# Patient Record
Sex: MALE | Race: WHITE | Employment: OTHER
[De-identification: names, ages, dates, MRNs, and addresses within clinical notes are randomized per-mention and may not be internally consistent; named-entity substitution may affect disease eponyms.]

---

## 2017-01-06 DIAGNOSIS — M54.50 CHRONIC MIDLINE LOW BACK PAIN WITHOUT SCIATICA: ICD-10-CM

## 2017-01-06 DIAGNOSIS — G89.29 CHRONIC MIDLINE LOW BACK PAIN WITHOUT SCIATICA: ICD-10-CM

## 2017-01-09 RX ORDER — OXYCODONE HYDROCHLORIDE AND ACETAMINOPHEN 5; 325 MG/1; MG/1
1 TABLET ORAL 2 TIMES DAILY PRN
Qty: 60 TABLET | Refills: 0 | Status: SHIPPED | OUTPATIENT
Start: 2017-01-09 | End: 2017-02-13 | Stop reason: SDUPTHER

## 2017-01-31 RX ORDER — GABAPENTIN 300 MG/1
CAPSULE ORAL
Qty: 90 CAPSULE | Refills: 3 | Status: SHIPPED | OUTPATIENT
Start: 2017-01-31 | End: 2017-05-15 | Stop reason: SDUPTHER

## 2017-02-13 ENCOUNTER — OFFICE VISIT (OUTPATIENT)
Dept: INTERNAL MEDICINE | Facility: CLINIC | Age: 67
End: 2017-02-13

## 2017-02-13 VITALS
RESPIRATION RATE: 18 BRPM | HEART RATE: 92 BPM | SYSTOLIC BLOOD PRESSURE: 82 MMHG | DIASTOLIC BLOOD PRESSURE: 52 MMHG | TEMPERATURE: 99.2 F | BODY MASS INDEX: 26.22 KG/M2 | HEIGHT: 72 IN | WEIGHT: 193.6 LBS

## 2017-02-13 DIAGNOSIS — G89.29 CHRONIC MIDLINE LOW BACK PAIN WITHOUT SCIATICA: ICD-10-CM

## 2017-02-13 DIAGNOSIS — R11.2 NON-INTRACTABLE VOMITING WITH NAUSEA, UNSPECIFIED VOMITING TYPE: ICD-10-CM

## 2017-02-13 DIAGNOSIS — M54.50 CHRONIC MIDLINE LOW BACK PAIN WITHOUT SCIATICA: ICD-10-CM

## 2017-02-13 DIAGNOSIS — R73.09 ELEVATED HEMOGLOBIN A1C: Primary | ICD-10-CM

## 2017-02-13 DIAGNOSIS — J06.9 UPPER RESPIRATORY TRACT INFECTION, UNSPECIFIED TYPE: ICD-10-CM

## 2017-02-13 DIAGNOSIS — R05.9 COUGH: ICD-10-CM

## 2017-02-13 DIAGNOSIS — E78.00 PURE HYPERCHOLESTEROLEMIA: ICD-10-CM

## 2017-02-13 LAB — HBA1C MFR BLD: 5.9 %

## 2017-02-13 PROCEDURE — G8484 FLU IMMUNIZE NO ADMIN: HCPCS | Performed by: NURSE PRACTITIONER

## 2017-02-13 PROCEDURE — G8427 DOCREV CUR MEDS BY ELIG CLIN: HCPCS | Performed by: NURSE PRACTITIONER

## 2017-02-13 PROCEDURE — 83036 HEMOGLOBIN GLYCOSYLATED A1C: CPT | Performed by: NURSE PRACTITIONER

## 2017-02-13 PROCEDURE — 1123F ACP DISCUSS/DSCN MKR DOCD: CPT | Performed by: NURSE PRACTITIONER

## 2017-02-13 PROCEDURE — 3017F COLORECTAL CA SCREEN DOC REV: CPT | Performed by: NURSE PRACTITIONER

## 2017-02-13 PROCEDURE — G8420 CALC BMI NORM PARAMETERS: HCPCS | Performed by: NURSE PRACTITIONER

## 2017-02-13 PROCEDURE — 1036F TOBACCO NON-USER: CPT | Performed by: NURSE PRACTITIONER

## 2017-02-13 PROCEDURE — 99214 OFFICE O/P EST MOD 30 MIN: CPT | Performed by: NURSE PRACTITIONER

## 2017-02-13 PROCEDURE — 4040F PNEUMOC VAC/ADMIN/RCVD: CPT | Performed by: NURSE PRACTITIONER

## 2017-02-13 RX ORDER — GUAIFENESIN AND CODEINE PHOSPHATE 100; 10 MG/5ML; MG/5ML
5 SOLUTION ORAL 2 TIMES DAILY PRN
Qty: 140 ML | Refills: 0 | Status: SHIPPED | OUTPATIENT
Start: 2017-02-13 | End: 2017-02-20

## 2017-02-13 RX ORDER — TRAMADOL HYDROCHLORIDE 50 MG/1
TABLET ORAL
Qty: 90 TABLET | Refills: 0 | Status: CANCELLED | OUTPATIENT
Start: 2017-02-13

## 2017-02-13 RX ORDER — BENZONATATE 100 MG/1
100 CAPSULE ORAL 3 TIMES DAILY PRN
Qty: 30 CAPSULE | Refills: 0 | Status: SHIPPED | OUTPATIENT
Start: 2017-02-13 | End: 2017-05-15 | Stop reason: ALTCHOICE

## 2017-02-13 RX ORDER — TRAMADOL HYDROCHLORIDE 50 MG/1
TABLET ORAL
Qty: 90 TABLET | Refills: 0 | Status: SHIPPED | OUTPATIENT
Start: 2017-02-13 | End: 2017-05-15 | Stop reason: SDUPTHER

## 2017-02-13 RX ORDER — OXYCODONE HYDROCHLORIDE AND ACETAMINOPHEN 5; 325 MG/1; MG/1
1 TABLET ORAL 2 TIMES DAILY PRN
Qty: 60 TABLET | Refills: 0 | Status: SHIPPED | OUTPATIENT
Start: 2017-02-13 | End: 2017-04-11 | Stop reason: SDUPTHER

## 2017-02-13 RX ORDER — ONDANSETRON 4 MG/1
4 TABLET, FILM COATED ORAL EVERY 8 HOURS PRN
Qty: 30 TABLET | Refills: 0 | Status: SHIPPED | OUTPATIENT
Start: 2017-02-13 | End: 2017-08-07 | Stop reason: ALTCHOICE

## 2017-02-13 RX ORDER — OXYCODONE HYDROCHLORIDE AND ACETAMINOPHEN 5; 325 MG/1; MG/1
1 TABLET ORAL 2 TIMES DAILY PRN
Qty: 60 TABLET | Refills: 0 | Status: CANCELLED | OUTPATIENT
Start: 2017-02-13

## 2017-02-13 ASSESSMENT — ENCOUNTER SYMPTOMS
NAUSEA: 1
ABDOMINAL PAIN: 0
WHEEZING: 1
BACK PAIN: 1
VOICE CHANGE: 1
VOMITING: 1
CHEST TIGHTNESS: 1
SORE THROAT: 1
DIARRHEA: 0
BLOOD IN STOOL: 0
RHINORRHEA: 1
CONSTIPATION: 0
SINUS PAIN: 1
SHORTNESS OF BREATH: 0
SINUS PRESSURE: 1
COUGH: 1
TROUBLE SWALLOWING: 0

## 2017-03-06 RX ORDER — TEMAZEPAM 15 MG/1
CAPSULE ORAL
Qty: 90 CAPSULE | Refills: 0 | Status: SHIPPED | OUTPATIENT
Start: 2017-03-06 | End: 2017-05-15 | Stop reason: SDUPTHER

## 2017-04-07 RX ORDER — LEVOTHYROXINE SODIUM 0.07 MG/1
TABLET ORAL
Qty: 90 TABLET | Refills: 1 | Status: SHIPPED | OUTPATIENT
Start: 2017-04-07 | End: 2017-05-15 | Stop reason: SDUPTHER

## 2017-04-11 DIAGNOSIS — M54.50 CHRONIC MIDLINE LOW BACK PAIN WITHOUT SCIATICA: ICD-10-CM

## 2017-04-11 DIAGNOSIS — G89.29 CHRONIC MIDLINE LOW BACK PAIN WITHOUT SCIATICA: ICD-10-CM

## 2017-04-11 RX ORDER — OXYCODONE HYDROCHLORIDE AND ACETAMINOPHEN 5; 325 MG/1; MG/1
1 TABLET ORAL 2 TIMES DAILY PRN
Qty: 60 TABLET | Refills: 0 | Status: SHIPPED | OUTPATIENT
Start: 2017-04-11 | End: 2017-05-15 | Stop reason: SDUPTHER

## 2017-05-03 RX ORDER — TOPIRAMATE 50 MG/1
TABLET, FILM COATED ORAL
Qty: 180 TABLET | Refills: 0 | Status: SHIPPED | OUTPATIENT
Start: 2017-05-03 | End: 2017-05-15 | Stop reason: SDUPTHER

## 2017-05-03 RX ORDER — ALPRAZOLAM 0.5 MG/1
TABLET ORAL
Qty: 180 TABLET | Refills: 0 | Status: SHIPPED | OUTPATIENT
Start: 2017-05-03 | End: 2017-05-15 | Stop reason: SDUPTHER

## 2017-05-03 RX ORDER — SIMVASTATIN 80 MG
TABLET ORAL
Qty: 90 TABLET | Refills: 0 | Status: SHIPPED | OUTPATIENT
Start: 2017-05-03 | End: 2017-05-15 | Stop reason: SDUPTHER

## 2017-05-15 ENCOUNTER — OFFICE VISIT (OUTPATIENT)
Dept: PRIMARY CARE CLINIC | Age: 67
End: 2017-05-15
Payer: MEDICARE

## 2017-05-15 VITALS
HEIGHT: 72 IN | DIASTOLIC BLOOD PRESSURE: 72 MMHG | SYSTOLIC BLOOD PRESSURE: 114 MMHG | BODY MASS INDEX: 25.79 KG/M2 | WEIGHT: 190.4 LBS | HEART RATE: 72 BPM | RESPIRATION RATE: 18 BRPM

## 2017-05-15 DIAGNOSIS — M79.7 FIBROMYALGIA: ICD-10-CM

## 2017-05-15 DIAGNOSIS — Z11.59 NEED FOR HEPATITIS C SCREENING TEST: ICD-10-CM

## 2017-05-15 DIAGNOSIS — K21.9 GASTROESOPHAGEAL REFLUX DISEASE, ESOPHAGITIS PRESENCE NOT SPECIFIED: ICD-10-CM

## 2017-05-15 DIAGNOSIS — Z86.73 HX-TIA (TRANSIENT ISCHEMIC ATTACK): ICD-10-CM

## 2017-05-15 DIAGNOSIS — E03.9 ACQUIRED HYPOTHYROIDISM: ICD-10-CM

## 2017-05-15 DIAGNOSIS — G89.29 CHRONIC MIDLINE LOW BACK PAIN WITHOUT SCIATICA: Primary | ICD-10-CM

## 2017-05-15 DIAGNOSIS — M15.9 PRIMARY OSTEOARTHRITIS INVOLVING MULTIPLE JOINTS: ICD-10-CM

## 2017-05-15 DIAGNOSIS — G47.00 INSOMNIA, UNSPECIFIED TYPE: ICD-10-CM

## 2017-05-15 DIAGNOSIS — E78.00 PURE HYPERCHOLESTEROLEMIA: ICD-10-CM

## 2017-05-15 DIAGNOSIS — M54.50 CHRONIC MIDLINE LOW BACK PAIN WITHOUT SCIATICA: Primary | ICD-10-CM

## 2017-05-15 DIAGNOSIS — F41.9 ANXIETY: ICD-10-CM

## 2017-05-15 PROBLEM — G44.229 CHRONIC TENSION-TYPE HEADACHE, NOT INTRACTABLE: Status: ACTIVE | Noted: 2017-05-15

## 2017-05-15 PROCEDURE — G8420 CALC BMI NORM PARAMETERS: HCPCS | Performed by: NURSE PRACTITIONER

## 2017-05-15 PROCEDURE — 4040F PNEUMOC VAC/ADMIN/RCVD: CPT | Performed by: NURSE PRACTITIONER

## 2017-05-15 PROCEDURE — 3017F COLORECTAL CA SCREEN DOC REV: CPT | Performed by: NURSE PRACTITIONER

## 2017-05-15 PROCEDURE — 1123F ACP DISCUSS/DSCN MKR DOCD: CPT | Performed by: NURSE PRACTITIONER

## 2017-05-15 PROCEDURE — 99214 OFFICE O/P EST MOD 30 MIN: CPT | Performed by: NURSE PRACTITIONER

## 2017-05-15 PROCEDURE — 1036F TOBACCO NON-USER: CPT | Performed by: NURSE PRACTITIONER

## 2017-05-15 PROCEDURE — G8427 DOCREV CUR MEDS BY ELIG CLIN: HCPCS | Performed by: NURSE PRACTITIONER

## 2017-05-15 RX ORDER — TEMAZEPAM 30 MG/1
CAPSULE ORAL
Qty: 90 CAPSULE | Refills: 0 | Status: SHIPPED | OUTPATIENT
Start: 2017-05-15 | End: 2017-08-07 | Stop reason: SDUPTHER

## 2017-05-15 RX ORDER — TOPIRAMATE 50 MG/1
TABLET, FILM COATED ORAL
Qty: 180 TABLET | Refills: 0 | Status: SHIPPED | OUTPATIENT
Start: 2017-05-15 | End: 2017-08-07 | Stop reason: SDUPTHER

## 2017-05-15 RX ORDER — OMEPRAZOLE 40 MG/1
40 CAPSULE, DELAYED RELEASE ORAL DAILY
Qty: 90 CAPSULE | Refills: 3 | Status: SHIPPED | OUTPATIENT
Start: 2017-05-15 | End: 2018-05-02 | Stop reason: SDUPTHER

## 2017-05-15 RX ORDER — CITALOPRAM 20 MG/1
TABLET ORAL
Qty: 90 TABLET | Refills: 3 | Status: SHIPPED | OUTPATIENT
Start: 2017-05-15 | End: 2017-08-07 | Stop reason: SDUPTHER

## 2017-05-15 RX ORDER — ALPRAZOLAM 0.5 MG/1
TABLET ORAL
Qty: 180 TABLET | Refills: 0 | Status: SHIPPED | OUTPATIENT
Start: 2017-05-15 | End: 2017-08-07 | Stop reason: SDUPTHER

## 2017-05-15 RX ORDER — LEVOTHYROXINE SODIUM 0.07 MG/1
75 TABLET ORAL DAILY
Qty: 90 TABLET | Refills: 3 | Status: SHIPPED | OUTPATIENT
Start: 2017-05-15 | End: 2017-10-31 | Stop reason: SDUPTHER

## 2017-05-15 RX ORDER — TRAMADOL HYDROCHLORIDE 50 MG/1
TABLET ORAL
Qty: 90 TABLET | Refills: 0 | Status: SHIPPED | OUTPATIENT
Start: 2017-05-15 | End: 2017-08-07 | Stop reason: SDUPTHER

## 2017-05-15 RX ORDER — OXYCODONE HYDROCHLORIDE AND ACETAMINOPHEN 5; 325 MG/1; MG/1
1 TABLET ORAL 2 TIMES DAILY PRN
Qty: 60 TABLET | Refills: 0 | Status: SHIPPED | OUTPATIENT
Start: 2017-05-15 | End: 2017-08-07

## 2017-05-15 RX ORDER — SIMVASTATIN 80 MG
TABLET ORAL
Qty: 90 TABLET | Refills: 3 | Status: SHIPPED | OUTPATIENT
Start: 2017-05-15 | End: 2017-08-03 | Stop reason: SDUPTHER

## 2017-05-15 RX ORDER — GABAPENTIN 300 MG/1
CAPSULE ORAL
Qty: 120 CAPSULE | Refills: 3 | Status: SHIPPED | OUTPATIENT
Start: 2017-05-15 | End: 2017-06-05 | Stop reason: SDUPTHER

## 2017-05-15 ASSESSMENT — ENCOUNTER SYMPTOMS
VOMITING: 0
WHEEZING: 0
SHORTNESS OF BREATH: 0
COUGH: 0
NAUSEA: 0
SINUS PRESSURE: 0
TROUBLE SWALLOWING: 0
BLOOD IN STOOL: 0
SORE THROAT: 0
CONSTIPATION: 0
ABDOMINAL PAIN: 0
DIARRHEA: 0

## 2017-05-15 ASSESSMENT — PATIENT HEALTH QUESTIONNAIRE - PHQ9
2. FEELING DOWN, DEPRESSED OR HOPELESS: 1
1. LITTLE INTEREST OR PLEASURE IN DOING THINGS: 0
SUM OF ALL RESPONSES TO PHQ9 QUESTIONS 1 & 2: 1
SUM OF ALL RESPONSES TO PHQ QUESTIONS 1-9: 1

## 2017-05-16 ASSESSMENT — ENCOUNTER SYMPTOMS: BACK PAIN: 1

## 2017-06-05 ENCOUNTER — HOSPITAL ENCOUNTER (OUTPATIENT)
Age: 67
Discharge: HOME | End: 2017-06-05
Payer: MEDICARE

## 2017-06-05 DIAGNOSIS — E03.9 ACQUIRED HYPOTHYROIDISM: ICD-10-CM

## 2017-06-05 DIAGNOSIS — M15.9 PRIMARY OSTEOARTHRITIS INVOLVING MULTIPLE JOINTS: ICD-10-CM

## 2017-06-05 DIAGNOSIS — Z11.59 NEED FOR HEPATITIS C SCREENING TEST: ICD-10-CM

## 2017-06-05 DIAGNOSIS — E78.00 PURE HYPERCHOLESTEROLEMIA: ICD-10-CM

## 2017-06-05 LAB
ALBUMIN SERPL-MCNC: 4.1 G/DL (ref 3.5–5.2)
ALBUMIN/GLOBULIN RATIO: 1.6 (ref 1–2.5)
ALP BLD-CCNC: 50 U/L (ref 40–129)
ALT SERPL-CCNC: 17 U/L (ref 5–41)
ANION GAP SERPL CALCULATED.3IONS-SCNC: 11 MMOL/L (ref 9–17)
AST SERPL-CCNC: 17 U/L
BILIRUB SERPL-MCNC: 0.73 MG/DL (ref 0.3–1.2)
BUN BLDV-MCNC: 18 MG/DL (ref 8–23)
BUN/CREAT BLD: NORMAL (ref 9–20)
CALCIUM SERPL-MCNC: 8.8 MG/DL (ref 8.6–10.4)
CHLORIDE BLD-SCNC: 107 MMOL/L (ref 98–107)
CHOLESTEROL/HDL RATIO: 3.7
CHOLESTEROL: 177 MG/DL
CO2: 25 MMOL/L (ref 20–31)
CREAT SERPL-MCNC: 1 MG/DL (ref 0.7–1.2)
GFR AFRICAN AMERICAN: >60 ML/MIN
GFR NON-AFRICAN AMERICAN: >60 ML/MIN
GFR SERPL CREATININE-BSD FRML MDRD: NORMAL ML/MIN/{1.73_M2}
GFR SERPL CREATININE-BSD FRML MDRD: NORMAL ML/MIN/{1.73_M2}
GLUCOSE BLD-MCNC: 97 MG/DL (ref 70–99)
HDLC SERPL-MCNC: 48 MG/DL
HEPATITIS C ANTIBODY: NONREACTIVE
LDL CHOLESTEROL: 74 MG/DL (ref 0–130)
POTASSIUM SERPL-SCNC: 4.2 MMOL/L (ref 3.7–5.3)
SODIUM BLD-SCNC: 143 MMOL/L (ref 135–144)
TOTAL PROTEIN: 6.6 G/DL (ref 6.4–8.3)
TRIGL SERPL-MCNC: 277 MG/DL
TSH SERPL DL<=0.05 MIU/L-ACNC: 4.65 MIU/L (ref 0.3–5)
VLDLC SERPL CALC-MCNC: ABNORMAL MG/DL (ref 1–30)

## 2017-06-05 PROCEDURE — 80061 LIPID PANEL: CPT

## 2017-06-05 PROCEDURE — 36415 COLL VENOUS BLD VENIPUNCTURE: CPT

## 2017-06-05 PROCEDURE — 80053 COMPREHEN METABOLIC PANEL: CPT

## 2017-06-05 PROCEDURE — 86803 HEPATITIS C AB TEST: CPT

## 2017-06-05 PROCEDURE — 84443 ASSAY THYROID STIM HORMONE: CPT

## 2017-06-07 ENCOUNTER — TELEPHONE (OUTPATIENT)
Dept: PRIMARY CARE CLINIC | Age: 67
End: 2017-06-07

## 2017-06-20 ENCOUNTER — TELEPHONE (OUTPATIENT)
Dept: PRIMARY CARE CLINIC | Age: 67
End: 2017-06-20

## 2017-08-03 RX ORDER — SIMVASTATIN 80 MG
TABLET ORAL
Qty: 90 TABLET | Refills: 3 | Status: SHIPPED | OUTPATIENT
Start: 2017-08-03 | End: 2018-07-17 | Stop reason: SDUPTHER

## 2017-08-07 ENCOUNTER — OFFICE VISIT (OUTPATIENT)
Dept: PRIMARY CARE CLINIC | Age: 67
End: 2017-08-07
Payer: MEDICARE

## 2017-08-07 VITALS
BODY MASS INDEX: 25.98 KG/M2 | DIASTOLIC BLOOD PRESSURE: 66 MMHG | HEART RATE: 80 BPM | SYSTOLIC BLOOD PRESSURE: 96 MMHG | WEIGHT: 191.8 LBS | RESPIRATION RATE: 18 BRPM | HEIGHT: 72 IN

## 2017-08-07 DIAGNOSIS — G89.29 CHRONIC MIDLINE LOW BACK PAIN WITHOUT SCIATICA: Primary | ICD-10-CM

## 2017-08-07 DIAGNOSIS — M54.50 CHRONIC MIDLINE LOW BACK PAIN WITHOUT SCIATICA: Primary | ICD-10-CM

## 2017-08-07 DIAGNOSIS — F41.9 ANXIETY: ICD-10-CM

## 2017-08-07 DIAGNOSIS — E78.1 HYPERTRIGLYCERIDEMIA: ICD-10-CM

## 2017-08-07 DIAGNOSIS — Z86.73 HX-TIA (TRANSIENT ISCHEMIC ATTACK): ICD-10-CM

## 2017-08-07 DIAGNOSIS — M15.9 PRIMARY OSTEOARTHRITIS INVOLVING MULTIPLE JOINTS: ICD-10-CM

## 2017-08-07 DIAGNOSIS — G47.00 INSOMNIA, UNSPECIFIED TYPE: ICD-10-CM

## 2017-08-07 PROCEDURE — G8419 CALC BMI OUT NRM PARAM NOF/U: HCPCS | Performed by: NURSE PRACTITIONER

## 2017-08-07 PROCEDURE — 99214 OFFICE O/P EST MOD 30 MIN: CPT | Performed by: NURSE PRACTITIONER

## 2017-08-07 PROCEDURE — 1123F ACP DISCUSS/DSCN MKR DOCD: CPT | Performed by: NURSE PRACTITIONER

## 2017-08-07 PROCEDURE — 4040F PNEUMOC VAC/ADMIN/RCVD: CPT | Performed by: NURSE PRACTITIONER

## 2017-08-07 PROCEDURE — 1036F TOBACCO NON-USER: CPT | Performed by: NURSE PRACTITIONER

## 2017-08-07 PROCEDURE — G8427 DOCREV CUR MEDS BY ELIG CLIN: HCPCS | Performed by: NURSE PRACTITIONER

## 2017-08-07 PROCEDURE — 3017F COLORECTAL CA SCREEN DOC REV: CPT | Performed by: NURSE PRACTITIONER

## 2017-08-07 RX ORDER — TOPIRAMATE 50 MG/1
TABLET, FILM COATED ORAL
Qty: 180 TABLET | Refills: 0 | Status: SHIPPED | OUTPATIENT
Start: 2017-08-07 | End: 2017-11-13 | Stop reason: SDUPTHER

## 2017-08-07 RX ORDER — CITALOPRAM 20 MG/1
TABLET ORAL
Qty: 90 TABLET | Refills: 3 | Status: SHIPPED | OUTPATIENT
Start: 2017-08-07 | End: 2018-05-02 | Stop reason: SDUPTHER

## 2017-08-07 RX ORDER — TRAMADOL HYDROCHLORIDE 50 MG/1
TABLET ORAL
Qty: 90 TABLET | Refills: 0 | Status: SHIPPED | OUTPATIENT
Start: 2017-08-07 | End: 2018-03-13 | Stop reason: SDUPTHER

## 2017-08-07 RX ORDER — ALPRAZOLAM 0.5 MG/1
TABLET ORAL
Qty: 180 TABLET | Refills: 0 | Status: SHIPPED | OUTPATIENT
Start: 2017-08-07 | End: 2017-11-13 | Stop reason: SDUPTHER

## 2017-08-07 RX ORDER — GABAPENTIN 300 MG/1
300 CAPSULE ORAL 4 TIMES DAILY
Qty: 120 CAPSULE | Refills: 3 | Status: SHIPPED | OUTPATIENT
Start: 2017-08-07 | End: 2018-05-02 | Stop reason: SDUPTHER

## 2017-08-07 RX ORDER — TEMAZEPAM 30 MG/1
CAPSULE ORAL
Qty: 90 CAPSULE | Refills: 0 | Status: SHIPPED | OUTPATIENT
Start: 2017-08-07 | End: 2017-11-06 | Stop reason: SDUPTHER

## 2017-08-07 ASSESSMENT — ENCOUNTER SYMPTOMS
COUGH: 0
WHEEZING: 0
BACK PAIN: 1
VOMITING: 0
CONSTIPATION: 0
SINUS PRESSURE: 0
SHORTNESS OF BREATH: 0
TROUBLE SWALLOWING: 0
ABDOMINAL PAIN: 0
DIARRHEA: 0
SORE THROAT: 0
NAUSEA: 0
BLOOD IN STOOL: 0

## 2017-10-31 RX ORDER — LEVOTHYROXINE SODIUM 0.07 MG/1
TABLET ORAL
Qty: 90 TABLET | Refills: 1 | Status: SHIPPED | OUTPATIENT
Start: 2017-10-31 | End: 2018-03-13 | Stop reason: SDUPTHER

## 2017-11-06 DIAGNOSIS — G47.00 INSOMNIA, UNSPECIFIED TYPE: ICD-10-CM

## 2017-11-06 RX ORDER — TEMAZEPAM 30 MG/1
CAPSULE ORAL
Qty: 90 CAPSULE | Refills: 0 | Status: SHIPPED | OUTPATIENT
Start: 2017-11-06 | End: 2018-02-19 | Stop reason: SDUPTHER

## 2017-11-13 DIAGNOSIS — F41.9 ANXIETY: ICD-10-CM

## 2017-11-13 DIAGNOSIS — Z86.73 HX-TIA (TRANSIENT ISCHEMIC ATTACK): ICD-10-CM

## 2017-11-13 RX ORDER — ALPRAZOLAM 0.5 MG/1
TABLET ORAL
Qty: 180 TABLET | Refills: 0 | Status: SHIPPED | OUTPATIENT
Start: 2017-11-13 | End: 2018-02-19 | Stop reason: SDUPTHER

## 2017-11-13 RX ORDER — TOPIRAMATE 50 MG/1
TABLET, FILM COATED ORAL
Qty: 180 TABLET | Refills: 0 | Status: SHIPPED | OUTPATIENT
Start: 2017-11-13 | End: 2018-02-19 | Stop reason: SDUPTHER

## 2017-11-14 DIAGNOSIS — F41.9 ANXIETY: ICD-10-CM

## 2017-11-14 RX ORDER — ALPRAZOLAM 0.5 MG/1
TABLET ORAL
Qty: 180 TABLET | Refills: 0 | OUTPATIENT
Start: 2017-11-14

## 2017-12-04 ENCOUNTER — HOSPITAL ENCOUNTER (OUTPATIENT)
Age: 67
Discharge: HOME | End: 2017-12-04
Payer: MEDICARE

## 2017-12-04 DIAGNOSIS — E78.1 HYPERTRIGLYCERIDEMIA: ICD-10-CM

## 2017-12-04 LAB
CHOLESTEROL/HDL RATIO: 3.5
CHOLESTEROL: 152 MG/DL
HDLC SERPL-MCNC: 43 MG/DL
LDL CHOLESTEROL: 47 MG/DL (ref 0–130)
TRIGL SERPL-MCNC: 309 MG/DL
VLDLC SERPL CALC-MCNC: ABNORMAL MG/DL (ref 1–30)

## 2017-12-04 PROCEDURE — 80061 LIPID PANEL: CPT

## 2017-12-04 PROCEDURE — 36415 COLL VENOUS BLD VENIPUNCTURE: CPT

## 2017-12-05 DIAGNOSIS — E78.1 HYPERTRIGLYCERIDEMIA: ICD-10-CM

## 2017-12-05 RX ORDER — EZETIMIBE 10 MG/1
10 TABLET ORAL DAILY
Qty: 30 TABLET | Refills: 3 | Status: SHIPPED | OUTPATIENT
Start: 2017-12-05 | End: 2018-03-13 | Stop reason: SDUPTHER

## 2017-12-06 ENCOUNTER — TELEPHONE (OUTPATIENT)
Dept: PRIMARY CARE CLINIC | Age: 67
End: 2017-12-06

## 2017-12-06 NOTE — TELEPHONE ENCOUNTER
----- Message from Davey Kayser, CNP sent at 12/5/2017  5:13 PM EST -----  Please let him know that his triglycerides have continued to increase  Would benefit from an additional medication to help control triglyceride level in addition to his current statin  Prescription has been sent to his pharmacy

## 2017-12-06 NOTE — TELEPHONE ENCOUNTER
Writer attempted to notify patient of lab results, called all available numbers listed. No valid numbers available.

## 2017-12-07 ENCOUNTER — OFFICE VISIT (OUTPATIENT)
Dept: PRIMARY CARE CLINIC | Age: 67
End: 2017-12-07
Payer: MEDICARE

## 2017-12-07 VITALS
BODY MASS INDEX: 27.85 KG/M2 | SYSTOLIC BLOOD PRESSURE: 112 MMHG | RESPIRATION RATE: 18 BRPM | HEIGHT: 72 IN | WEIGHT: 205.6 LBS | DIASTOLIC BLOOD PRESSURE: 62 MMHG

## 2017-12-07 DIAGNOSIS — M79.7 FIBROMYALGIA: ICD-10-CM

## 2017-12-07 DIAGNOSIS — G47.00 INSOMNIA, UNSPECIFIED TYPE: ICD-10-CM

## 2017-12-07 DIAGNOSIS — G44.229 CHRONIC TENSION-TYPE HEADACHE, NOT INTRACTABLE: ICD-10-CM

## 2017-12-07 DIAGNOSIS — M54.50 CHRONIC MIDLINE LOW BACK PAIN WITHOUT SCIATICA: ICD-10-CM

## 2017-12-07 DIAGNOSIS — E78.1 HYPERTRIGLYCERIDEMIA: ICD-10-CM

## 2017-12-07 DIAGNOSIS — K21.9 GASTROESOPHAGEAL REFLUX DISEASE, ESOPHAGITIS PRESENCE NOT SPECIFIED: ICD-10-CM

## 2017-12-07 DIAGNOSIS — F41.9 ANXIETY: ICD-10-CM

## 2017-12-07 DIAGNOSIS — M15.9 PRIMARY OSTEOARTHRITIS INVOLVING MULTIPLE JOINTS: ICD-10-CM

## 2017-12-07 DIAGNOSIS — G89.29 CHRONIC MIDLINE LOW BACK PAIN WITHOUT SCIATICA: ICD-10-CM

## 2017-12-07 DIAGNOSIS — E78.2 MIXED HYPERLIPIDEMIA: Primary | ICD-10-CM

## 2017-12-07 PROCEDURE — 4040F PNEUMOC VAC/ADMIN/RCVD: CPT | Performed by: NURSE PRACTITIONER

## 2017-12-07 PROCEDURE — G8417 CALC BMI ABV UP PARAM F/U: HCPCS | Performed by: NURSE PRACTITIONER

## 2017-12-07 PROCEDURE — 1123F ACP DISCUSS/DSCN MKR DOCD: CPT | Performed by: NURSE PRACTITIONER

## 2017-12-07 PROCEDURE — 1036F TOBACCO NON-USER: CPT | Performed by: NURSE PRACTITIONER

## 2017-12-07 PROCEDURE — G8427 DOCREV CUR MEDS BY ELIG CLIN: HCPCS | Performed by: NURSE PRACTITIONER

## 2017-12-07 PROCEDURE — 3017F COLORECTAL CA SCREEN DOC REV: CPT | Performed by: NURSE PRACTITIONER

## 2017-12-07 PROCEDURE — 99214 OFFICE O/P EST MOD 30 MIN: CPT | Performed by: NURSE PRACTITIONER

## 2017-12-07 PROCEDURE — G8484 FLU IMMUNIZE NO ADMIN: HCPCS | Performed by: NURSE PRACTITIONER

## 2017-12-07 ASSESSMENT — ENCOUNTER SYMPTOMS
VOMITING: 0
SINUS PRESSURE: 0
SHORTNESS OF BREATH: 0
BLOOD IN STOOL: 0
CONSTIPATION: 0
TROUBLE SWALLOWING: 0
BACK PAIN: 1
DIARRHEA: 0

## 2017-12-07 NOTE — PROGRESS NOTES
Chronic Disease Visit Information    BP Readings from Last 3 Encounters:   08/07/17 96/66   05/15/17 114/72   02/13/17 (!) 82/52          Hemoglobin A1C (%)   Date Value   02/13/2017 5.9   11/10/2016 6.3   07/07/2016 5.9     LDL Cholesterol (mg/dL)   Date Value   12/04/2017 47     LDL Calculated (mg/dL)   Date Value   06/03/2016 49     HDL (mg/dL)   Date Value   12/04/2017 43     BUN (mg/dL)   Date Value   06/05/2017 18     CREATININE (mg/dL)   Date Value   06/05/2017 1.00     Glucose (mg/dL)   Date Value   06/05/2017 97            Have you changed or started any medications since your last visit including any over-the-counter medicines, vitamins, or herbal medicines? no   Are you having any side effects from any of your medications? -  no  Have you stopped taking any of your medications? Is so, why? -  no    Have you seen any other physician or provider since your last visit? No  Have you had any other diagnostic tests since your last visit? Yes - Records Obtained  Have you been seen in the emergency room and/or had an admission to a hospital since we last saw you? No  Have you had your annual diabetic retinal (eye) exam? No, na  Have you had your routine dental cleaning in the past 6 months? no    Have you activated your [employer] account? If not, what are your barriers?  Yes     Patient Care Team:  Haresh Cardenas CNP as PCP - General (Family Nurse Practitioner)  Haresh Cardenas CNP as PCP - S Attributed Provider         Medical History Review  Past Medical, Family, and Social History reviewed and does contribute to the patient presenting condition    Health Maintenance   Topic Date Due    AAA screen  1950    DTaP/Tdap/Td vaccine (1 - Tdap) 06/29/1969    Lipid screen  12/04/2022    Colon cancer screen colonoscopy  06/04/2024    Zostavax vaccine  Addressed    Flu vaccine  Completed    Pneumococcal low/med risk  Completed    Hepatitis C screen  Completed
Normocephalic. Eyes: Conjunctivae and EOM are normal. Pupils are equal, round, and reactive to light. Neck: Normal range of motion. Cardiovascular: Normal rate, regular rhythm, normal heart sounds and intact distal pulses. No murmur heard. Pulmonary/Chest: Effort normal and breath sounds normal. He has no wheezes. Abdominal: Soft. Bowel sounds are normal. He exhibits no distension. Musculoskeletal: Normal range of motion. Neurological: He is alert and oriented to person, place, and time. Skin: Skin is warm and dry. Psychiatric: He has a normal mood and affect. His behavior is normal. Judgment and thought content normal.     /62 (Site: Left Arm, Position: Sitting, Cuff Size: Medium Adult)   Resp 18   Ht 6' (1.829 m)   Wt 205 lb 9.6 oz (93.3 kg)   BMI 27.88 kg/m²     Assessment:      1. Mixed hyperlipidemia     2. Hypertriglyceridemia     3. Primary osteoarthritis involving multiple joints     4. Chronic midline low back pain without sciatica     5. Chronic tension-type headache, not intractable     6. Fibromyalgia     7. Gastroesophageal reflux disease, esophagitis presence not specified     8. Anxiety     9. Insomnia, unspecified type               Plan:      Return in about 3 months (around 3/7/2018). HLD, elevated trigs-reviewed and discussed recent lipid panel and need to add zetia  Arthritis, back pain, fibro-cont tramadol prn, stable at this time, will call if his right shoulder pain worsens and will consider imaging  Headaches-better lately, stable  GERD-well controlled  Insomnia, anxiety-stable and well controlled, cont current meds   Patient given educational materials - see patient instructions. Discussed use, benefit, and side effects of prescribed medications. All patient questions answered. Pt voiced understanding. Reviewed health maintenance. Instructed to continue current medications, diet and exercise. Patient agreed with treatment plan. Follow up as directed.

## 2018-02-19 DIAGNOSIS — G47.00 INSOMNIA, UNSPECIFIED TYPE: ICD-10-CM

## 2018-02-19 DIAGNOSIS — F41.9 ANXIETY: ICD-10-CM

## 2018-02-19 DIAGNOSIS — Z86.73 HX-TIA (TRANSIENT ISCHEMIC ATTACK): ICD-10-CM

## 2018-02-19 RX ORDER — TOPIRAMATE 50 MG/1
TABLET, FILM COATED ORAL
Qty: 180 TABLET | Refills: 0 | Status: SHIPPED | OUTPATIENT
Start: 2018-02-19 | End: 2018-03-13 | Stop reason: SDUPTHER

## 2018-02-19 RX ORDER — ALPRAZOLAM 0.5 MG/1
TABLET ORAL
Qty: 180 TABLET | Refills: 0 | Status: SHIPPED | OUTPATIENT
Start: 2018-02-19 | End: 2018-03-13 | Stop reason: SDUPTHER

## 2018-02-19 RX ORDER — TEMAZEPAM 30 MG/1
CAPSULE ORAL
Qty: 90 CAPSULE | Refills: 0 | Status: SHIPPED | OUTPATIENT
Start: 2018-02-19 | End: 2018-03-13 | Stop reason: SDUPTHER

## 2018-02-19 NOTE — TELEPHONE ENCOUNTER
Last OV 12/07/2017    Health Maintenance   Topic Date Due    AAA screen  1950    DTaP/Tdap/Td vaccine (1 - Tdap) 06/29/1969    A1C test (Diabetic or Prediabetic)  02/13/2018    TSH testing  06/05/2018    Lipid screen  12/04/2022    Colon cancer screen colonoscopy  06/04/2024    Zostavax vaccine  Addressed    Flu vaccine  Completed    Pneumococcal low/med risk  Completed    Hepatitis C screen  Completed             (applicable per patient's age: Cancer Screenings, Depression Screening, Fall Risk Screening, Immunizations)    Hemoglobin A1C (%)   Date Value   02/13/2017 5.9   11/10/2016 6.3   07/07/2016 5.9     LDL Cholesterol (mg/dL)   Date Value   12/04/2017 47     LDL Calculated (mg/dL)   Date Value   06/03/2016 49     AST (U/L)   Date Value   06/05/2017 17     ALT (U/L)   Date Value   06/05/2017 17     BUN (mg/dL)   Date Value   06/05/2017 18      (goal A1C is < 7)   (goal LDL is <100) need 30-50% reduction from baseline     BP Readings from Last 3 Encounters:   12/07/17 112/62   08/07/17 96/66   05/15/17 114/72    (goal /80)      All Future Testing planned in [facility]:      Next Visit Date:  Future Appointments  Date Time Provider Iggy Leger   3/8/2018 1:40 PM La Chan CNP [facility]            Patient Active Problem List:     Hyperlipidemia     Ear infection     Anxiety     Insomnia     Osteoarthritis     Sleep apnea     Headache     Chronic midline low back pain without sciatica     Gastroesophageal reflux disease     Therapeutic opioid induced constipation     Acquired hypothyroidism     Chronic tension-type headache, not intractable     Hx-TIA (transient ischemic attack)     Fibromyalgia     Hypertriglyceridemia

## 2018-03-08 ENCOUNTER — TELEPHONE (OUTPATIENT)
Dept: PRIMARY CARE CLINIC | Age: 68
End: 2018-03-08

## 2018-03-13 ENCOUNTER — OFFICE VISIT (OUTPATIENT)
Dept: PRIMARY CARE CLINIC | Age: 68
End: 2018-03-13
Payer: MEDICARE

## 2018-03-13 VITALS
SYSTOLIC BLOOD PRESSURE: 90 MMHG | WEIGHT: 208.2 LBS | HEIGHT: 72 IN | HEART RATE: 84 BPM | BODY MASS INDEX: 28.2 KG/M2 | DIASTOLIC BLOOD PRESSURE: 62 MMHG | RESPIRATION RATE: 18 BRPM

## 2018-03-13 DIAGNOSIS — G47.00 INSOMNIA, UNSPECIFIED TYPE: ICD-10-CM

## 2018-03-13 DIAGNOSIS — E03.9 ACQUIRED HYPOTHYROIDISM: ICD-10-CM

## 2018-03-13 DIAGNOSIS — E78.2 MIXED HYPERLIPIDEMIA: ICD-10-CM

## 2018-03-13 DIAGNOSIS — Z86.73 HX-TIA (TRANSIENT ISCHEMIC ATTACK): ICD-10-CM

## 2018-03-13 DIAGNOSIS — E78.1 HYPERTRIGLYCERIDEMIA: ICD-10-CM

## 2018-03-13 DIAGNOSIS — G89.29 CHRONIC MIDLINE LOW BACK PAIN WITHOUT SCIATICA: Primary | ICD-10-CM

## 2018-03-13 DIAGNOSIS — M15.9 PRIMARY OSTEOARTHRITIS INVOLVING MULTIPLE JOINTS: ICD-10-CM

## 2018-03-13 DIAGNOSIS — M54.50 CHRONIC MIDLINE LOW BACK PAIN WITHOUT SCIATICA: Primary | ICD-10-CM

## 2018-03-13 DIAGNOSIS — F41.9 ANXIETY: ICD-10-CM

## 2018-03-13 PROCEDURE — 99214 OFFICE O/P EST MOD 30 MIN: CPT | Performed by: NURSE PRACTITIONER

## 2018-03-13 RX ORDER — TEMAZEPAM 30 MG/1
CAPSULE ORAL
Qty: 90 CAPSULE | Refills: 0 | Status: SHIPPED | OUTPATIENT
Start: 2018-03-13 | End: 2018-07-17 | Stop reason: SDUPTHER

## 2018-03-13 RX ORDER — ALPRAZOLAM 0.5 MG/1
TABLET ORAL
Qty: 180 TABLET | Refills: 0 | Status: SHIPPED | OUTPATIENT
Start: 2018-03-13 | End: 2018-07-17 | Stop reason: SDUPTHER

## 2018-03-13 RX ORDER — EZETIMIBE 10 MG/1
10 TABLET ORAL DAILY
Qty: 90 TABLET | Refills: 3 | Status: SHIPPED | OUTPATIENT
Start: 2018-03-13 | End: 2018-12-10 | Stop reason: SDUPTHER

## 2018-03-13 RX ORDER — LEVOTHYROXINE SODIUM 0.07 MG/1
TABLET ORAL
Qty: 90 TABLET | Refills: 3 | Status: SHIPPED | OUTPATIENT
Start: 2018-03-13 | End: 2019-03-19 | Stop reason: SDUPTHER

## 2018-03-13 RX ORDER — TOPIRAMATE 50 MG/1
TABLET, FILM COATED ORAL
Qty: 180 TABLET | Refills: 1 | Status: SHIPPED | OUTPATIENT
Start: 2018-03-13 | End: 2018-11-28 | Stop reason: SDUPTHER

## 2018-03-13 RX ORDER — TRAMADOL HYDROCHLORIDE 50 MG/1
TABLET ORAL
Qty: 90 TABLET | Refills: 0 | Status: SHIPPED | OUTPATIENT
Start: 2018-03-13 | End: 2018-08-01 | Stop reason: SDUPTHER

## 2018-03-13 ASSESSMENT — ENCOUNTER SYMPTOMS
SINUS PRESSURE: 0
BACK PAIN: 1
TROUBLE SWALLOWING: 0
DIARRHEA: 0
ABDOMINAL PAIN: 0
COUGH: 0
BLOOD IN STOOL: 0
CONSTIPATION: 0
SORE THROAT: 0
NAUSEA: 0
WHEEZING: 0
SHORTNESS OF BREATH: 0
VOMITING: 0

## 2018-03-13 NOTE — PROGRESS NOTES
Visit Information    Have you changed or started any medications since your last visit including any over-the-counter medicines, vitamins, or herbal medicines? no   Are you having any side effects from any of your medications? -  no  Have you stopped taking any of your medications? Is so, why? -  no    Have you seen any other physician or provider since your last visit? No  Have you had any other diagnostic tests since your last visit? No  Have you been seen in the emergency room and/or had an admission to a hospital since we last saw you? No  Have you had your routine dental cleaning in the past 6 months? no    Have you activated your [employer] account? If not, what are your barriers?  Yes     Patient Care Team:  Sachi Wellington CNP as PCP - General (Family Nurse Practitioner)    Medical History Review  Past Medical, Family, and Social History reviewed and does not contribute to the patient presenting condition    Health Maintenance   Topic Date Due    AAA screen  1950    DTaP/Tdap/Td vaccine (1 - Tdap) 06/29/1969    Shingles Vaccine (1 of 2 - 2 Dose Series) 06/29/2000    A1C test (Diabetic or Prediabetic)  02/13/2018    TSH testing  06/05/2018    Lipid screen  12/04/2022    Colon cancer screen colonoscopy  06/04/2024    Flu vaccine  Completed    Pneumococcal low/med risk  Completed    Hepatitis C screen  Completed
repeat lipid panel and other labs in June  Hypothyroidism-able, refill on the levothyroxine, due for labs in June  Anxiety, insomnia-well-controlled with current meds, refills provided     Patient given educational materials - see patient instructions. Discussed use, benefit, and side effects of prescribed medications. All patient questions answered. Pt voiced understanding. Reviewed health maintenance. Instructed to continue current medications, diet and exercise. Patient agreed with treatment plan. Follow up as directed.      Electronically signed by Huma Arroyo CNP on 3/13/2018 at 5:30 PM

## 2018-03-27 ENCOUNTER — TELEPHONE (OUTPATIENT)
Dept: PRIMARY CARE CLINIC | Age: 68
End: 2018-03-27

## 2018-03-27 NOTE — TELEPHONE ENCOUNTER
It is safe for him to miss a day or 2 without his medications. He should resume once he feels able to tolerate them. Continue omeprazole daily for at least 2 weeks even if his symptoms get better soon. If the diarrhea worsens he is advised to come in for a appointment.   Push fluids, clear liquids, bland diet as tolerated

## 2018-05-02 DIAGNOSIS — M54.50 CHRONIC MIDLINE LOW BACK PAIN WITHOUT SCIATICA: ICD-10-CM

## 2018-05-02 DIAGNOSIS — K21.9 GASTROESOPHAGEAL REFLUX DISEASE, ESOPHAGITIS PRESENCE NOT SPECIFIED: ICD-10-CM

## 2018-05-02 DIAGNOSIS — G89.29 CHRONIC MIDLINE LOW BACK PAIN WITHOUT SCIATICA: ICD-10-CM

## 2018-05-02 DIAGNOSIS — F41.9 ANXIETY: ICD-10-CM

## 2018-05-02 RX ORDER — CITALOPRAM 20 MG/1
TABLET ORAL
Qty: 90 TABLET | Refills: 3 | Status: SHIPPED | OUTPATIENT
Start: 2018-05-02 | End: 2019-02-18 | Stop reason: SDUPTHER

## 2018-05-02 RX ORDER — GABAPENTIN 300 MG/1
300 CAPSULE ORAL 4 TIMES DAILY
Qty: 360 CAPSULE | Refills: 3 | Status: SHIPPED | OUTPATIENT
Start: 2018-05-02 | End: 2019-04-29 | Stop reason: SDUPTHER

## 2018-05-02 RX ORDER — OMEPRAZOLE 40 MG/1
40 CAPSULE, DELAYED RELEASE ORAL DAILY
Qty: 90 CAPSULE | Refills: 3 | Status: SHIPPED | OUTPATIENT
Start: 2018-05-02 | End: 2019-03-19 | Stop reason: SDUPTHER

## 2018-07-17 ENCOUNTER — OFFICE VISIT (OUTPATIENT)
Dept: PRIMARY CARE CLINIC | Age: 68
End: 2018-07-17
Payer: MEDICARE

## 2018-07-17 VITALS
WEIGHT: 202.4 LBS | HEART RATE: 88 BPM | HEIGHT: 72 IN | BODY MASS INDEX: 27.41 KG/M2 | RESPIRATION RATE: 18 BRPM | SYSTOLIC BLOOD PRESSURE: 98 MMHG | DIASTOLIC BLOOD PRESSURE: 60 MMHG

## 2018-07-17 DIAGNOSIS — G89.29 CHRONIC MIDLINE LOW BACK PAIN WITHOUT SCIATICA: Primary | ICD-10-CM

## 2018-07-17 DIAGNOSIS — F41.9 ANXIETY: ICD-10-CM

## 2018-07-17 DIAGNOSIS — E03.9 ACQUIRED HYPOTHYROIDISM: ICD-10-CM

## 2018-07-17 DIAGNOSIS — M79.7 FIBROMYALGIA: ICD-10-CM

## 2018-07-17 DIAGNOSIS — Z13.0 SCREENING FOR DEFICIENCY ANEMIA: ICD-10-CM

## 2018-07-17 DIAGNOSIS — E78.1 HYPERTRIGLYCERIDEMIA: ICD-10-CM

## 2018-07-17 DIAGNOSIS — G47.00 INSOMNIA, UNSPECIFIED TYPE: ICD-10-CM

## 2018-07-17 DIAGNOSIS — M54.50 CHRONIC MIDLINE LOW BACK PAIN WITHOUT SCIATICA: Primary | ICD-10-CM

## 2018-07-17 DIAGNOSIS — G44.229 CHRONIC TENSION-TYPE HEADACHE, NOT INTRACTABLE: ICD-10-CM

## 2018-07-17 PROCEDURE — 99214 OFFICE O/P EST MOD 30 MIN: CPT | Performed by: NURSE PRACTITIONER

## 2018-07-17 RX ORDER — TEMAZEPAM 30 MG/1
CAPSULE ORAL
Qty: 90 CAPSULE | Refills: 0 | Status: SHIPPED | OUTPATIENT
Start: 2018-07-17 | End: 2018-10-17

## 2018-07-17 RX ORDER — ALPRAZOLAM 0.5 MG/1
TABLET ORAL
Qty: 180 TABLET | Refills: 0 | Status: SHIPPED | OUTPATIENT
Start: 2018-07-17 | End: 2018-11-12 | Stop reason: SDUPTHER

## 2018-07-17 RX ORDER — SIMVASTATIN 80 MG
TABLET ORAL
Qty: 90 TABLET | Refills: 3 | Status: SHIPPED | OUTPATIENT
Start: 2018-07-17 | End: 2019-07-02 | Stop reason: SDUPTHER

## 2018-07-17 RX ORDER — FLUTICASONE PROPIONATE 50 MCG
SPRAY, SUSPENSION (ML) NASAL
Qty: 3 BOTTLE | Refills: 3 | Status: SHIPPED | OUTPATIENT
Start: 2018-07-17 | End: 2019-07-02 | Stop reason: SDUPTHER

## 2018-07-17 RX ORDER — TRAMADOL HYDROCHLORIDE 50 MG/1
50 TABLET ORAL EVERY 6 HOURS PRN
COMMUNITY
End: 2018-07-17 | Stop reason: ALTCHOICE

## 2018-07-17 RX ORDER — BUTALBITAL, ACETAMINOPHEN, CAFFEINE AND CODEINE PHOSPHATE 300; 50; 40; 30 MG/1; MG/1; MG/1; MG/1
CAPSULE ORAL
Qty: 60 CAPSULE | Refills: 0 | Status: SHIPPED | OUTPATIENT
Start: 2018-07-17 | End: 2018-07-27 | Stop reason: SDUPTHER

## 2018-07-17 ASSESSMENT — ENCOUNTER SYMPTOMS
TROUBLE SWALLOWING: 0
ABDOMINAL PAIN: 0
CONSTIPATION: 0
NAUSEA: 0
DIARRHEA: 0
WHEEZING: 0
SHORTNESS OF BREATH: 0
VOMITING: 0
SORE THROAT: 0
BLOOD IN STOOL: 0
SINUS PRESSURE: 0
COUGH: 0
BACK PAIN: 1

## 2018-07-17 ASSESSMENT — PATIENT HEALTH QUESTIONNAIRE - PHQ9
SUM OF ALL RESPONSES TO PHQ9 QUESTIONS 1 & 2: 0
2. FEELING DOWN, DEPRESSED OR HOPELESS: 0
1. LITTLE INTEREST OR PLEASURE IN DOING THINGS: 0
SUM OF ALL RESPONSES TO PHQ QUESTIONS 1-9: 0

## 2018-07-17 NOTE — PROGRESS NOTES
history of    Headache(784.0)     migraines    Hyperlipidemia     Insomnia     Osteoarthritis     Unspecified sleep apnea       Past Surgical History:   Procedure Laterality Date    FOOT SURGERY Right     spur    TONSILLECTOMY AND ADENOIDECTOMY         Family History   Problem Relation Age of Onset    Thyroid Cancer Mother     Migraines Mother     Cancer Mother     High Cholesterol Father     Heart Disease Father        Social History   Substance Use Topics    Smoking status: Former Smoker     Packs/day: 3.00     Years: 20.00     Quit date: 1998    Smokeless tobacco: Never Used    Alcohol use Yes      Comment: occ      Current Outpatient Prescriptions   Medication Sig Dispense Refill    simvastatin (ZOCOR) 80 MG tablet TAKE ONE TABLET BY MOUTH ONCE DAILY AT BEDTIME 90 tablet 3    temazepam (RESTORIL) 30 MG capsule TAKE ONE CAPSULE BY MOUTH AT BEDTIME. 90 capsule 0    ALPRAZolam (XANAX) 0.5 MG tablet TAKE ONE TABLET BY MOUTH TWICE DAILY. 180 tablet 0    fluticasone (FLONASE) 50 MCG/ACT nasal spray USE TWO SPRAYS IN EACH NOSTRIL EVERY DAY 3 Bottle 3    butalbital-acetaminophen-caffeine-codeine (FIORICET WITH CODEINE) -35-30 MG per capsule TAKE 1-2 CAPSULES BY MOUTH EVERY 6 HOURS AS NEEDED. 60 capsule 0    omeprazole (PRILOSEC) 40 MG delayed release capsule Take 1 capsule by mouth daily 90 capsule 3    gabapentin (NEURONTIN) 300 MG capsule Take 1 capsule by mouth 4 times daily for 30 days. . 360 capsule 3    citalopram (CELEXA) 20 MG tablet TAKE ONE TABLET BY MOUTH ONCE DAILY 90 tablet 3    levothyroxine (SYNTHROID) 75 MCG tablet TAKE ONE TABLET BY MOUTH ONCE DAILY 90 tablet 3    ezetimibe (ZETIA) 10 MG tablet Take 1 tablet by mouth daily 90 tablet 3    topiramate (TOPAMAX) 50 MG tablet TAKE ONE TABLET BY MOUTH TWICE DAILY 180 tablet 1    Blood Pressure Monitoring (BLOOD PRESS MONITOR/M-L CUFF) MISC 1 kit by Does not apply route daily.  1 each 0     No current facility-administered rhythm, normal heart sounds and intact distal pulses. No murmur heard. Pulmonary/Chest: Effort normal and breath sounds normal. He has no wheezes. Abdominal: Soft. Bowel sounds are normal. He exhibits no distension. Musculoskeletal: Normal range of motion. Neurological: He is alert and oriented to person, place, and time. Skin: Skin is warm and dry. Psychiatric: He has a normal mood and affect. His behavior is normal. Judgment and thought content normal.     BP 98/60 (Site: Left Arm, Position: Sitting, Cuff Size: Medium Adult)   Pulse 88   Resp 18   Ht 6' (1.829 m)   Wt 202 lb 6.4 oz (91.8 kg)   BMI 27.45 kg/m²     Assessment:       Diagnosis Orders   1. Chronic midline low back pain without sciatica  Comprehensive Metabolic Panel   2. Fibromyalgia     3. Insomnia, unspecified type  temazepam (RESTORIL) 30 MG capsule   4. Anxiety  ALPRAZolam (XANAX) 0.5 MG tablet   5. Chronic tension-type headache, not intractable  butalbital-acetaminophen-caffeine-codeine (FIORICET WITH CODEINE) -97-30 MG per capsule   6. Hypertriglyceridemia  simvastatin (ZOCOR) 80 MG tablet    Lipid Panel    Comprehensive Metabolic Panel   7. Acquired hypothyroidism  TSH without Reflex   8. Screening for deficiency anemia  CBC Auto Differential             Plan:      Return in about 4 months (around 11/17/2018) for back pain, anxiety.     Orders Placed This Encounter   Procedures    Lipid Panel     Standing Status:   Future     Standing Expiration Date:   7/17/2019     Order Specific Question:   Is Patient Fasting?/# of Hours     Answer:   8    CBC Auto Differential     Standing Status:   Future     Standing Expiration Date:   7/17/2019    Comprehensive Metabolic Panel     Standing Status:   Future     Standing Expiration Date:   7/17/2019    TSH without Reflex     Standing Status:   Future     Standing Expiration Date:   7/17/2019     Orders Placed This Encounter   Medications    simvastatin (ZOCOR) 80 MG tablet Sig: TAKE ONE TABLET BY MOUTH ONCE DAILY AT BEDTIME     Dispense:  90 tablet     Refill:  3    temazepam (RESTORIL) 30 MG capsule     Sig: TAKE ONE CAPSULE BY MOUTH AT BEDTIME. Dispense:  90 capsule     Refill:  0    ALPRAZolam (XANAX) 0.5 MG tablet     Sig: TAKE ONE TABLET BY MOUTH TWICE DAILY. Dispense:  180 tablet     Refill:  0    fluticasone (FLONASE) 50 MCG/ACT nasal spray     Sig: USE TWO SPRAYS IN EACH NOSTRIL EVERY DAY     Dispense:  3 Bottle     Refill:  3    butalbital-acetaminophen-caffeine-codeine (FIORICET WITH CODEINE) -14-30 MG per capsule     Sig: TAKE 1-2 CAPSULES BY MOUTH EVERY 6 HOURS AS NEEDED. Dispense:  60 capsule     Refill:  0      Back pain, fibromyalgia-stable and well-controlled on current medications  Insomnia, anxiety-well-controlled on current meds, refills provided  Headaches-Fioricet sent to pharmacy per his request, discontinue tramadol and Fioricet is covered  Controlled Substances Monitoring:   RX Monitoring 7/17/2018   Attestation The Prescription Monitoring Report for this patient was reviewed today. Documentation No signs of potential drug abuse or diversion identified. Medication Contracts Medication contract signed today. Hyperlipidemia-due for recheck on lipid panel, refill on statin, started on triglyceride lowering agent 6 months ago  Hypothyroidism-stable, due for TSH     Patient given educational materials - see patient instructions. Discussed use, benefit, and side effects of prescribed medications. All patient questions answered. Pt voiced understanding. Reviewed health maintenance. Instructed to continue current medications, diet and exercise. Patient agreed with treatment plan. Follow up as directed.      Electronically signed by LINUS Neville CNP on 7/17/2018 at 4:24 PM

## 2018-07-27 ENCOUNTER — TELEPHONE (OUTPATIENT)
Dept: PRIMARY CARE CLINIC | Age: 68
End: 2018-07-27

## 2018-07-27 DIAGNOSIS — G44.229 CHRONIC TENSION-TYPE HEADACHE, NOT INTRACTABLE: ICD-10-CM

## 2018-07-27 RX ORDER — BUTALBITAL, ACETAMINOPHEN, CAFFEINE AND CODEINE PHOSPHATE 300; 50; 40; 30 MG/1; MG/1; MG/1; MG/1
CAPSULE ORAL
Qty: 15 CAPSULE | Refills: 0 | Status: SHIPPED | OUTPATIENT
Start: 2018-07-27 | End: 2019-07-22

## 2018-07-27 NOTE — TELEPHONE ENCOUNTER
Spoke to pts wife he was [facility] I called him in 15 capsule to [address] but we discussed if she had a goodrx card which we should have in office she could get 60 capsules at [address] she may be contacting office Monday in regards to this, but wanted the 15 capsules sent tonight to [address] [facility]

## 2018-08-01 DIAGNOSIS — M15.9 PRIMARY OSTEOARTHRITIS INVOLVING MULTIPLE JOINTS: ICD-10-CM

## 2018-08-01 DIAGNOSIS — M54.50 CHRONIC MIDLINE LOW BACK PAIN WITHOUT SCIATICA: ICD-10-CM

## 2018-08-01 DIAGNOSIS — G89.29 CHRONIC MIDLINE LOW BACK PAIN WITHOUT SCIATICA: ICD-10-CM

## 2018-08-01 RX ORDER — TRAMADOL HYDROCHLORIDE 50 MG/1
TABLET ORAL
Qty: 90 TABLET | Refills: 0 | Status: SHIPPED | OUTPATIENT
Start: 2018-08-01 | End: 2018-09-01

## 2018-08-01 NOTE — TELEPHONE ENCOUNTER
This would need to be ordered by Darius Page as she is his PCP and I have never seen him,  I will route to her she will be in tomorrow.

## 2018-11-07 ENCOUNTER — HOSPITAL ENCOUNTER (OUTPATIENT)
Age: 68
Discharge: HOME | End: 2018-11-07
Payer: MEDICARE

## 2018-11-07 DIAGNOSIS — M54.50 CHRONIC MIDLINE LOW BACK PAIN WITHOUT SCIATICA: ICD-10-CM

## 2018-11-07 DIAGNOSIS — Z13.0 SCREENING FOR DEFICIENCY ANEMIA: ICD-10-CM

## 2018-11-07 DIAGNOSIS — E78.1 HYPERTRIGLYCERIDEMIA: ICD-10-CM

## 2018-11-07 DIAGNOSIS — E03.9 ACQUIRED HYPOTHYROIDISM: ICD-10-CM

## 2018-11-07 DIAGNOSIS — G89.29 CHRONIC MIDLINE LOW BACK PAIN WITHOUT SCIATICA: ICD-10-CM

## 2018-11-07 LAB
ABSOLUTE EOS #: 0.12 K/UL (ref 0–0.44)
ABSOLUTE IMMATURE GRANULOCYTE: <0.03 K/UL (ref 0–0.3)
ABSOLUTE LYMPH #: 1.94 K/UL (ref 1.1–3.7)
ABSOLUTE MONO #: 0.33 K/UL (ref 0.1–1.2)
ALBUMIN SERPL-MCNC: 4.2 G/DL (ref 3.5–5.2)
ALBUMIN/GLOBULIN RATIO: 1.8 (ref 1–2.5)
ALP BLD-CCNC: 57 U/L (ref 40–129)
ALT SERPL-CCNC: 13 U/L (ref 5–41)
ANION GAP SERPL CALCULATED.3IONS-SCNC: 9 MMOL/L (ref 9–17)
AST SERPL-CCNC: 15 U/L
BASOPHILS # BLD: 1 % (ref 0–2)
BASOPHILS ABSOLUTE: 0.04 K/UL (ref 0–0.2)
BILIRUB SERPL-MCNC: 0.83 MG/DL (ref 0.3–1.2)
BUN BLDV-MCNC: 15 MG/DL (ref 8–23)
BUN/CREAT BLD: ABNORMAL (ref 9–20)
CALCIUM SERPL-MCNC: 8.8 MG/DL (ref 8.6–10.4)
CHLORIDE BLD-SCNC: 108 MMOL/L (ref 98–107)
CHOLESTEROL/HDL RATIO: 3
CHOLESTEROL: 113 MG/DL
CO2: 25 MMOL/L (ref 20–31)
CREAT SERPL-MCNC: 1.12 MG/DL (ref 0.7–1.2)
DIFFERENTIAL TYPE: ABNORMAL
EOSINOPHILS RELATIVE PERCENT: 2 % (ref 1–4)
GFR AFRICAN AMERICAN: >60 ML/MIN
GFR NON-AFRICAN AMERICAN: >60 ML/MIN
GFR SERPL CREATININE-BSD FRML MDRD: ABNORMAL ML/MIN/{1.73_M2}
GFR SERPL CREATININE-BSD FRML MDRD: ABNORMAL ML/MIN/{1.73_M2}
GLUCOSE BLD-MCNC: 117 MG/DL (ref 70–99)
HCT VFR BLD CALC: 52.3 % (ref 40.7–50.3)
HDLC SERPL-MCNC: 38 MG/DL
HEMOGLOBIN: 16.5 G/DL (ref 13–17)
IMMATURE GRANULOCYTES: 0 %
LDL CHOLESTEROL: 42 MG/DL (ref 0–130)
LYMPHOCYTES # BLD: 37 % (ref 24–43)
MCH RBC QN AUTO: 29.9 PG (ref 25.2–33.5)
MCHC RBC AUTO-ENTMCNC: 31.5 G/DL (ref 28.4–34.8)
MCV RBC AUTO: 94.9 FL (ref 82.6–102.9)
MONOCYTES # BLD: 6 % (ref 3–12)
NRBC AUTOMATED: 0 PER 100 WBC
PDW BLD-RTO: 12.9 % (ref 11.8–14.4)
PLATELET # BLD: 140 K/UL (ref 138–453)
PLATELET ESTIMATE: ABNORMAL
PMV BLD AUTO: 12.1 FL (ref 8.1–13.5)
POTASSIUM SERPL-SCNC: 4.7 MMOL/L (ref 3.7–5.3)
RBC # BLD: 5.51 M/UL (ref 4.21–5.77)
RBC # BLD: ABNORMAL 10*6/UL
SEG NEUTROPHILS: 54 % (ref 36–65)
SEGMENTED NEUTROPHILS ABSOLUTE COUNT: 2.81 K/UL (ref 1.5–8.1)
SODIUM BLD-SCNC: 142 MMOL/L (ref 135–144)
TOTAL PROTEIN: 6.6 G/DL (ref 6.4–8.3)
TRIGL SERPL-MCNC: 165 MG/DL
TSH SERPL DL<=0.05 MIU/L-ACNC: 3.41 MIU/L (ref 0.3–5)
VLDLC SERPL CALC-MCNC: ABNORMAL MG/DL (ref 1–30)
WBC # BLD: 5.3 K/UL (ref 3.5–11.3)
WBC # BLD: ABNORMAL 10*3/UL

## 2018-11-07 PROCEDURE — 85025 COMPLETE CBC W/AUTO DIFF WBC: CPT

## 2018-11-07 PROCEDURE — 80053 COMPREHEN METABOLIC PANEL: CPT

## 2018-11-07 PROCEDURE — 84443 ASSAY THYROID STIM HORMONE: CPT

## 2018-11-07 PROCEDURE — 80061 LIPID PANEL: CPT

## 2018-11-07 PROCEDURE — 36415 COLL VENOUS BLD VENIPUNCTURE: CPT

## 2018-11-12 DIAGNOSIS — G47.00 INSOMNIA, UNSPECIFIED TYPE: ICD-10-CM

## 2018-11-12 DIAGNOSIS — F41.9 ANXIETY: ICD-10-CM

## 2018-11-12 RX ORDER — ALPRAZOLAM 0.5 MG/1
TABLET ORAL
Qty: 180 TABLET | Refills: 0 | Status: SHIPPED | OUTPATIENT
Start: 2018-11-12 | End: 2019-02-12 | Stop reason: SDUPTHER

## 2018-11-12 RX ORDER — TEMAZEPAM 30 MG/1
CAPSULE ORAL
Qty: 90 CAPSULE | Refills: 0 | Status: SHIPPED | OUTPATIENT
Start: 2018-11-12 | End: 2019-03-19 | Stop reason: SDUPTHER

## 2018-11-12 NOTE — TELEPHONE ENCOUNTER
Last ov 7-17-18  Health Maintenance   Topic Date Due    AAA screen  1950    DTaP/Tdap/Td vaccine (1 - Tdap) 06/29/1969    Shingles Vaccine (1 of 2 - 2 Dose Series) 12/27/2017    A1C test (Diabetic or Prediabetic)  02/13/2018    Flu vaccine (1) 09/01/2018    TSH testing  11/07/2019    Lipid screen  11/07/2023    Colon cancer screen colonoscopy  06/04/2024    Pneumococcal low/med risk  Completed    Hepatitis C screen  Completed             (applicable per patient's age: Cancer Screenings, Depression Screening, Fall Risk Screening, Immunizations)    Hemoglobin A1C (%)   Date Value   02/13/2017 5.9   11/10/2016 6.3   07/07/2016 5.9     LDL Cholesterol (mg/dL)   Date Value   11/07/2018 42     LDL Calculated (mg/dL)   Date Value   06/03/2016 49     AST (U/L)   Date Value   11/07/2018 15     ALT (U/L)   Date Value   11/07/2018 13     BUN (mg/dL)   Date Value   11/07/2018 15      (goal A1C is < 7)   (goal LDL is <100) need 30-50% reduction from baseline     BP Readings from Last 3 Encounters:   07/17/18 98/60   03/13/18 90/62   12/07/17 112/62    (goal /80)      All Future Testing planned in [facility]:      Next Visit Date:  Future Appointments  Date Time Provider Iggy Leger   11/19/2018 1:00 PM LINUS Jones - CNP [id number]            Patient Active Problem List:     Hyperlipidemia     Ear infection     Anxiety     Insomnia     Osteoarthritis     Sleep apnea     Headache     Chronic midline low back pain without sciatica     Gastroesophageal reflux disease     Therapeutic opioid induced constipation     Acquired hypothyroidism     Chronic tension-type headache, not intractable     Hx-TIA (transient ischemic attack)     Fibromyalgia     Hypertriglyceridemia

## 2018-11-19 ENCOUNTER — OFFICE VISIT (OUTPATIENT)
Dept: PRIMARY CARE CLINIC | Age: 68
End: 2018-11-19
Payer: MEDICARE

## 2018-11-19 VITALS
HEART RATE: 80 BPM | SYSTOLIC BLOOD PRESSURE: 108 MMHG | WEIGHT: 205.6 LBS | DIASTOLIC BLOOD PRESSURE: 72 MMHG | BODY MASS INDEX: 27.85 KG/M2 | HEIGHT: 72 IN | RESPIRATION RATE: 18 BRPM

## 2018-11-19 DIAGNOSIS — G89.29 CHRONIC MIDLINE LOW BACK PAIN WITHOUT SCIATICA: Primary | ICD-10-CM

## 2018-11-19 DIAGNOSIS — M25.512 PAIN OF BOTH SHOULDER JOINTS: ICD-10-CM

## 2018-11-19 DIAGNOSIS — M25.511 PAIN OF BOTH SHOULDER JOINTS: ICD-10-CM

## 2018-11-19 DIAGNOSIS — M15.9 PRIMARY OSTEOARTHRITIS INVOLVING MULTIPLE JOINTS: ICD-10-CM

## 2018-11-19 DIAGNOSIS — E03.9 ACQUIRED HYPOTHYROIDISM: ICD-10-CM

## 2018-11-19 DIAGNOSIS — M54.50 CHRONIC MIDLINE LOW BACK PAIN WITHOUT SCIATICA: Primary | ICD-10-CM

## 2018-11-19 DIAGNOSIS — E78.2 MIXED HYPERLIPIDEMIA: ICD-10-CM

## 2018-11-19 PROCEDURE — 99214 OFFICE O/P EST MOD 30 MIN: CPT | Performed by: NURSE PRACTITIONER

## 2018-11-19 RX ORDER — CELECOXIB 100 MG/1
100 CAPSULE ORAL 2 TIMES DAILY
Qty: 60 CAPSULE | Refills: 3 | Status: SHIPPED | OUTPATIENT
Start: 2018-11-19 | End: 2019-03-19 | Stop reason: SDUPTHER

## 2018-11-19 RX ORDER — TRAMADOL HYDROCHLORIDE 50 MG/1
50 TABLET ORAL EVERY 6 HOURS PRN
Qty: 90 TABLET | Refills: 0 | Status: SHIPPED | OUTPATIENT
Start: 2018-11-19 | End: 2019-03-19 | Stop reason: SDUPTHER

## 2018-11-19 RX ORDER — TRAMADOL HYDROCHLORIDE 50 MG/1
50 TABLET ORAL EVERY 6 HOURS PRN
COMMUNITY
End: 2018-11-19 | Stop reason: SDUPTHER

## 2018-11-19 ASSESSMENT — ENCOUNTER SYMPTOMS
CONSTIPATION: 0
SHORTNESS OF BREATH: 0
BLOOD IN STOOL: 0
SORE THROAT: 0
ABDOMINAL PAIN: 0
TROUBLE SWALLOWING: 0
WHEEZING: 0
DIARRHEA: 0
BACK PAIN: 1
VOMITING: 0
SINUS PRESSURE: 0
COUGH: 0
NAUSEA: 0

## 2018-11-19 NOTE — PROGRESS NOTES
Ear infection     history of    Headache(784.0)     migraines    Hyperlipidemia     Insomnia     Osteoarthritis     Unspecified sleep apnea       Past Surgical History:   Procedure Laterality Date    FOOT SURGERY Right     spur    TONSILLECTOMY AND ADENOIDECTOMY         Family History   Problem Relation Age of Onset    Thyroid Cancer Mother     Migraines Mother     Cancer Mother     High Cholesterol Father     Heart Disease Father        Social History   Substance Use Topics    Smoking status: Former Smoker     Packs/day: 3.00     Years: 20.00     Quit date: 1998    Smokeless tobacco: Never Used    Alcohol use Yes      Comment: occ      Current Outpatient Prescriptions   Medication Sig Dispense Refill    traMADol (ULTRAM) 50 MG tablet Take 1 tablet by mouth every 6 hours as needed for Pain for up to 30 days. . 90 tablet 0    celecoxib (CELEBREX) 100 MG capsule Take 1 capsule by mouth 2 times daily 60 capsule 3    ALPRAZolam (XANAX) 0.5 MG tablet TAKE ONE TABLET BY MOUTH TWICE DAILY. 180 tablet 0    temazepam (RESTORIL) 30 MG capsule TAKE ONE CAPSULE BY MOUTH AT BEDTIME. 90 capsule 0    simvastatin (ZOCOR) 80 MG tablet TAKE ONE TABLET BY MOUTH ONCE DAILY AT BEDTIME 90 tablet 3    fluticasone (FLONASE) 50 MCG/ACT nasal spray USE TWO SPRAYS IN EACH NOSTRIL EVERY DAY 3 Bottle 3    omeprazole (PRILOSEC) 40 MG delayed release capsule Take 1 capsule by mouth daily 90 capsule 3    gabapentin (NEURONTIN) 300 MG capsule Take 1 capsule by mouth 4 times daily for 30 days. . 360 capsule 3    citalopram (CELEXA) 20 MG tablet TAKE ONE TABLET BY MOUTH ONCE DAILY 90 tablet 3    levothyroxine (SYNTHROID) 75 MCG tablet TAKE ONE TABLET BY MOUTH ONCE DAILY 90 tablet 3    ezetimibe (ZETIA) 10 MG tablet Take 1 tablet by mouth daily 90 tablet 3    topiramate (TOPAMAX) 50 MG tablet TAKE ONE TABLET BY MOUTH TWICE DAILY 180 tablet 1    Blood Pressure Monitoring (BLOOD PRESS MONITOR/M-L CUFF) MISC 1 kit by Does not apply route daily. 1 each 0     No current facility-administered medications for this visit. Allergies   Allergen Reactions    Pcn [Penicillins]        Health Maintenance   Topic Date Due    AAA screen  1950    DTaP/Tdap/Td vaccine (1 - Tdap) 06/29/1969    Shingles Vaccine (1 of 2 - 2 Dose Series) 12/27/2017    A1C test (Diabetic or Prediabetic)  02/13/2018    TSH testing  11/07/2019    Lipid screen  11/07/2023    Colon cancer screen colonoscopy  06/04/2024    Flu vaccine  Completed    Pneumococcal low/med risk  Completed    Hepatitis C screen  Completed       Subjective:      Review of Systems   Constitutional: Negative for activity change, appetite change, chills, fatigue, fever and unexpected weight change. HENT: Negative for congestion, ear pain, hearing loss, sinus pressure, sore throat and trouble swallowing. Eyes: Negative for visual disturbance. Respiratory: Negative for cough, shortness of breath and wheezing. Cardiovascular: Negative for chest pain, palpitations and leg swelling. Gastrointestinal: Negative for abdominal pain, blood in stool, constipation, diarrhea, nausea and vomiting. Endocrine: Negative for cold intolerance, heat intolerance, polydipsia, polyphagia and polyuria. Genitourinary: Negative for difficulty urinating, frequency, hematuria and urgency. Musculoskeletal: Positive for arthralgias, back pain and stiffness. Negative for myalgias. Skin: Negative for rash. Allergic/Immunologic: Negative for environmental allergies. Neurological: Positive for numbness (intermittent on right fingers). Negative for dizziness, weakness, light-headedness and headaches. Psychiatric/Behavioral: Negative for confusion. The patient is not nervous/anxious. Objective:     Physical Exam   Constitutional: He is oriented to person, place, and time. He appears well-developed and well-nourished. HENT:   Head: Normocephalic.    Eyes: Pupils are equal, round, and reactive to light. Conjunctivae and EOM are normal.   Neck: Normal range of motion. Cardiovascular: Normal rate, regular rhythm, normal heart sounds and intact distal pulses. No murmur heard. Pulmonary/Chest: Effort normal and breath sounds normal. He has no wheezes. Abdominal: Soft. Bowel sounds are normal. He exhibits no distension. Musculoskeletal:        Right shoulder: He exhibits decreased range of motion and tenderness. Left shoulder: He exhibits decreased range of motion. He exhibits no tenderness. Neurological: He is alert and oriented to person, place, and time. Skin: Skin is warm and dry. Psychiatric: He has a normal mood and affect. His behavior is normal. Judgment and thought content normal.     /72 (Site: Left Upper Arm, Position: Sitting, Cuff Size: Medium Adult)   Pulse 80   Resp 18   Ht 6' (1.829 m)   Wt 205 lb 9.6 oz (93.3 kg)   BMI 27.88 kg/m²     Assessment:       Diagnosis Orders   1. Chronic midline low back pain without sciatica  traMADol (ULTRAM) 50 MG tablet    celecoxib (CELEBREX) 100 MG capsule   2. Pain of both shoulder joints  XR SHOULDER RIGHT (MIN 2 VIEWS)    XR SHOULDER LEFT (MIN 2 VIEWS)    celecoxib (CELEBREX) 100 MG capsule   3. Primary osteoarthritis involving multiple joints  traMADol (ULTRAM) 50 MG tablet    XR SHOULDER RIGHT (MIN 2 VIEWS)    XR SHOULDER LEFT (MIN 2 VIEWS)    celecoxib (CELEBREX) 100 MG capsule   4. Acquired hypothyroidism     5. Mixed hyperlipidemia               Plan:      Return in about 4 months (around 3/19/2019) for back pain, OA.     Orders Placed This Encounter   Procedures    XR SHOULDER RIGHT (MIN 2 VIEWS)     Standing Status:   Future     Standing Expiration Date:   11/19/2019     Order Specific Question:   Reason for exam:     Answer:   shoulder pain    XR SHOULDER LEFT (MIN 2 VIEWS)     Standing Status:   Future     Standing Expiration Date:   11/19/2019     Order Specific Question:   Reason for exam:     Answer: shoulder pain     Orders Placed This Encounter   Medications    traMADol (ULTRAM) 50 MG tablet     Sig: Take 1 tablet by mouth every 6 hours as needed for Pain for up to 30 days. .     Dispense:  90 tablet     Refill:  0    celecoxib (CELEBREX) 100 MG capsule     Sig: Take 1 capsule by mouth 2 times daily     Dispense:  60 capsule     Refill:  3      Chronic low back pain, osteoarthritis-back pain unchanged, refill on tramadol, start Celebrex for progressing arthritis issues  Bilateral shoulder pain, right shoulder pain worse than left-x-rays, suspect arthritic change versus possible rotator cuff involvement on the right. Celebrex twice a day, tramadol as needed. Pending results of x-rays may need referral to orthopedics for possible surgical evaluation  Hypothyroidism, hyperlipidemia-both stable and well-controlled, reviewed recent labs, continue current meds   Patient given educational materials - see patient instructions. Discussed use, benefit, and side effects of prescribed medications. All patientquestions answered. Pt voiced understanding. Reviewed health maintenance. Instructedto continue current medications, diet and exercise. Patient agreed with treatmentplan. Follow up as directed.      Electronicallysigned by LINUS Hdez CNP on 11/19/2018 at 5:48 PM

## 2018-11-28 ENCOUNTER — HOSPITAL ENCOUNTER (OUTPATIENT)
Dept: GENERAL RADIOLOGY | Age: 68
Discharge: HOME | End: 2018-11-30
Payer: MEDICARE

## 2018-11-28 ENCOUNTER — HOSPITAL ENCOUNTER (OUTPATIENT)
Age: 68
Discharge: HOME | End: 2018-11-30
Payer: MEDICARE

## 2018-11-28 DIAGNOSIS — M25.511 PAIN OF BOTH SHOULDER JOINTS: ICD-10-CM

## 2018-11-28 DIAGNOSIS — Z86.73 HX-TIA (TRANSIENT ISCHEMIC ATTACK): ICD-10-CM

## 2018-11-28 DIAGNOSIS — M25.512 PAIN OF BOTH SHOULDER JOINTS: ICD-10-CM

## 2018-11-28 DIAGNOSIS — M15.9 PRIMARY OSTEOARTHRITIS INVOLVING MULTIPLE JOINTS: ICD-10-CM

## 2018-11-28 PROCEDURE — 73030 X-RAY EXAM OF SHOULDER: CPT

## 2018-11-29 RX ORDER — TOPIRAMATE 50 MG/1
50 TABLET, FILM COATED ORAL 2 TIMES DAILY
Qty: 180 TABLET | Refills: 1 | Status: SHIPPED | OUTPATIENT
Start: 2018-11-29 | End: 2019-02-18 | Stop reason: SDUPTHER

## 2018-12-10 DIAGNOSIS — E78.1 HYPERTRIGLYCERIDEMIA: ICD-10-CM

## 2018-12-10 RX ORDER — EZETIMIBE 10 MG/1
10 TABLET ORAL DAILY
Qty: 90 TABLET | Refills: 1 | Status: SHIPPED | OUTPATIENT
Start: 2018-12-10 | End: 2019-03-06 | Stop reason: SDUPTHER

## 2019-02-12 DIAGNOSIS — G89.29 CHRONIC MIDLINE LOW BACK PAIN WITHOUT SCIATICA: ICD-10-CM

## 2019-02-12 DIAGNOSIS — F41.9 ANXIETY: ICD-10-CM

## 2019-02-12 DIAGNOSIS — G47.00 INSOMNIA, UNSPECIFIED TYPE: ICD-10-CM

## 2019-02-12 DIAGNOSIS — M25.511 PAIN OF BOTH SHOULDER JOINTS: ICD-10-CM

## 2019-02-12 DIAGNOSIS — M15.9 PRIMARY OSTEOARTHRITIS INVOLVING MULTIPLE JOINTS: ICD-10-CM

## 2019-02-12 DIAGNOSIS — M25.512 PAIN OF BOTH SHOULDER JOINTS: ICD-10-CM

## 2019-02-12 DIAGNOSIS — M54.50 CHRONIC MIDLINE LOW BACK PAIN WITHOUT SCIATICA: ICD-10-CM

## 2019-02-12 RX ORDER — ALPRAZOLAM 0.5 MG/1
TABLET ORAL
Qty: 180 TABLET | Refills: 0 | Status: SHIPPED | OUTPATIENT
Start: 2019-02-12 | End: 2019-03-19 | Stop reason: SDUPTHER

## 2019-02-12 RX ORDER — TEMAZEPAM 30 MG/1
CAPSULE ORAL
Qty: 90 CAPSULE | Refills: 0 | Status: SHIPPED | OUTPATIENT
Start: 2019-02-12 | End: 2020-03-30 | Stop reason: SDUPTHER

## 2019-02-18 DIAGNOSIS — F41.9 ANXIETY: ICD-10-CM

## 2019-02-18 DIAGNOSIS — Z86.73 HX-TIA (TRANSIENT ISCHEMIC ATTACK): ICD-10-CM

## 2019-02-18 RX ORDER — CITALOPRAM 20 MG/1
TABLET ORAL
Qty: 90 TABLET | Refills: 3 | Status: SHIPPED | OUTPATIENT
Start: 2019-02-18 | End: 2019-03-19 | Stop reason: SDUPTHER

## 2019-02-18 RX ORDER — TOPIRAMATE 50 MG/1
50 TABLET, FILM COATED ORAL 2 TIMES DAILY
Qty: 180 TABLET | Refills: 1 | Status: SHIPPED | OUTPATIENT
Start: 2019-02-18 | End: 2019-08-27 | Stop reason: SDUPTHER

## 2019-03-06 DIAGNOSIS — E78.1 HYPERTRIGLYCERIDEMIA: ICD-10-CM

## 2019-03-06 RX ORDER — EZETIMIBE 10 MG/1
TABLET ORAL
Qty: 90 TABLET | Refills: 3 | Status: SHIPPED | OUTPATIENT
Start: 2019-03-06 | End: 2019-12-26 | Stop reason: SDUPTHER

## 2019-03-19 ENCOUNTER — OFFICE VISIT (OUTPATIENT)
Dept: PRIMARY CARE CLINIC | Age: 69
End: 2019-03-19
Payer: MEDICARE

## 2019-03-19 VITALS
HEIGHT: 72 IN | DIASTOLIC BLOOD PRESSURE: 58 MMHG | RESPIRATION RATE: 18 BRPM | WEIGHT: 210.8 LBS | HEART RATE: 80 BPM | SYSTOLIC BLOOD PRESSURE: 80 MMHG | BODY MASS INDEX: 28.55 KG/M2

## 2019-03-19 DIAGNOSIS — M25.511 PAIN OF BOTH SHOULDER JOINTS: ICD-10-CM

## 2019-03-19 DIAGNOSIS — G89.29 CHRONIC MIDLINE LOW BACK PAIN WITHOUT SCIATICA: ICD-10-CM

## 2019-03-19 DIAGNOSIS — E03.9 ACQUIRED HYPOTHYROIDISM: ICD-10-CM

## 2019-03-19 DIAGNOSIS — F41.9 ANXIETY: ICD-10-CM

## 2019-03-19 DIAGNOSIS — K21.9 GASTROESOPHAGEAL REFLUX DISEASE, ESOPHAGITIS PRESENCE NOT SPECIFIED: ICD-10-CM

## 2019-03-19 DIAGNOSIS — M54.50 CHRONIC MIDLINE LOW BACK PAIN WITHOUT SCIATICA: ICD-10-CM

## 2019-03-19 DIAGNOSIS — M25.512 PAIN OF BOTH SHOULDER JOINTS: ICD-10-CM

## 2019-03-19 DIAGNOSIS — G47.00 INSOMNIA, UNSPECIFIED TYPE: ICD-10-CM

## 2019-03-19 DIAGNOSIS — M15.9 PRIMARY OSTEOARTHRITIS INVOLVING MULTIPLE JOINTS: Primary | ICD-10-CM

## 2019-03-19 PROCEDURE — 99214 OFFICE O/P EST MOD 30 MIN: CPT | Performed by: NURSE PRACTITIONER

## 2019-03-19 RX ORDER — CITALOPRAM 20 MG/1
TABLET ORAL
Qty: 90 TABLET | Refills: 3 | Status: SHIPPED | OUTPATIENT
Start: 2019-03-19 | End: 2020-05-26 | Stop reason: SDUPTHER

## 2019-03-19 RX ORDER — ALPRAZOLAM 0.5 MG/1
TABLET ORAL
Qty: 180 TABLET | Refills: 0 | Status: SHIPPED | OUTPATIENT
Start: 2019-03-19 | End: 2019-08-12 | Stop reason: SDUPTHER

## 2019-03-19 RX ORDER — TEMAZEPAM 30 MG/1
CAPSULE ORAL
Qty: 90 CAPSULE | Refills: 0 | Status: SHIPPED | OUTPATIENT
Start: 2019-03-19 | End: 2019-04-29 | Stop reason: SDUPTHER

## 2019-03-19 RX ORDER — CELECOXIB 100 MG/1
100 CAPSULE ORAL 2 TIMES DAILY
Qty: 180 CAPSULE | Refills: 3 | Status: SHIPPED | OUTPATIENT
Start: 2019-03-19 | End: 2019-12-26 | Stop reason: SDUPTHER

## 2019-03-19 RX ORDER — OMEPRAZOLE 40 MG/1
40 CAPSULE, DELAYED RELEASE ORAL DAILY
Qty: 90 CAPSULE | Refills: 3 | Status: SHIPPED | OUTPATIENT
Start: 2019-03-19 | End: 2020-06-03 | Stop reason: SDUPTHER

## 2019-03-19 RX ORDER — TRAMADOL HYDROCHLORIDE 50 MG/1
50 TABLET ORAL EVERY 6 HOURS PRN
Qty: 90 TABLET | Refills: 0 | Status: SHIPPED | OUTPATIENT
Start: 2019-03-19 | End: 2019-09-10

## 2019-03-19 RX ORDER — LEVOTHYROXINE SODIUM 0.07 MG/1
TABLET ORAL
Qty: 90 TABLET | Refills: 3 | Status: SHIPPED | OUTPATIENT
Start: 2019-03-19 | End: 2020-01-27 | Stop reason: SDUPTHER

## 2019-03-19 ASSESSMENT — ENCOUNTER SYMPTOMS
WHEEZING: 0
VOMITING: 0
DIARRHEA: 0
SORE THROAT: 0
COUGH: 0
CONSTIPATION: 0
TROUBLE SWALLOWING: 0
SINUS PRESSURE: 0
BLOOD IN STOOL: 0
SHORTNESS OF BREATH: 0
BACK PAIN: 1
NAUSEA: 0
ABDOMINAL PAIN: 0

## 2019-03-19 ASSESSMENT — PATIENT HEALTH QUESTIONNAIRE - PHQ9
1. LITTLE INTEREST OR PLEASURE IN DOING THINGS: 0
SUM OF ALL RESPONSES TO PHQ QUESTIONS 1-9: 0
SUM OF ALL RESPONSES TO PHQ9 QUESTIONS 1 & 2: 0
2. FEELING DOWN, DEPRESSED OR HOPELESS: 0
SUM OF ALL RESPONSES TO PHQ QUESTIONS 1-9: 0

## 2019-03-21 ENCOUNTER — TELEPHONE (OUTPATIENT)
Dept: PRIMARY CARE CLINIC | Age: 69
End: 2019-03-21

## 2019-03-25 DIAGNOSIS — M54.50 CHRONIC MIDLINE LOW BACK PAIN WITHOUT SCIATICA: ICD-10-CM

## 2019-03-25 DIAGNOSIS — G89.29 CHRONIC MIDLINE LOW BACK PAIN WITHOUT SCIATICA: ICD-10-CM

## 2019-03-25 DIAGNOSIS — M15.9 PRIMARY OSTEOARTHRITIS INVOLVING MULTIPLE JOINTS: ICD-10-CM

## 2019-03-25 RX ORDER — TRAMADOL HYDROCHLORIDE 50 MG/1
TABLET ORAL
Qty: 90 TABLET | Refills: 0 | Status: SHIPPED | OUTPATIENT
Start: 2019-03-25 | End: 2019-04-24

## 2019-04-25 ENCOUNTER — TELEPHONE (OUTPATIENT)
Dept: PRIMARY CARE CLINIC | Age: 69
End: 2019-04-25

## 2019-04-25 NOTE — TELEPHONE ENCOUNTER
LMOM to return call to office, Temazepam is temporarily out of stock from [facility]-see form scanned in media

## 2019-04-29 DIAGNOSIS — G47.00 INSOMNIA, UNSPECIFIED TYPE: ICD-10-CM

## 2019-04-29 DIAGNOSIS — G89.29 CHRONIC MIDLINE LOW BACK PAIN WITHOUT SCIATICA: ICD-10-CM

## 2019-04-29 DIAGNOSIS — M54.50 CHRONIC MIDLINE LOW BACK PAIN WITHOUT SCIATICA: ICD-10-CM

## 2019-04-29 RX ORDER — TEMAZEPAM 30 MG/1
CAPSULE ORAL
Qty: 90 CAPSULE | Refills: 0 | Status: SHIPPED | OUTPATIENT
Start: 2019-04-29 | End: 2019-08-07 | Stop reason: SDUPTHER

## 2019-04-29 RX ORDER — GABAPENTIN 300 MG/1
300 CAPSULE ORAL 4 TIMES DAILY
Qty: 360 CAPSULE | Refills: 3 | Status: SHIPPED | OUTPATIENT
Start: 2019-04-29 | End: 2019-08-12 | Stop reason: SDUPTHER

## 2019-04-29 NOTE — TELEPHONE ENCOUNTER
Medication: temazepam  Last visit: 03/19/19  Next visit: 7/22/2019  Last refill: 03/19/19  [facility]

## 2019-07-15 DIAGNOSIS — E78.1 HYPERTRIGLYCERIDEMIA: ICD-10-CM

## 2019-07-15 RX ORDER — SIMVASTATIN 80 MG
TABLET ORAL
Qty: 90 TABLET | Refills: 3 | Status: SHIPPED | OUTPATIENT
Start: 2019-07-15 | End: 2020-09-21

## 2019-07-22 ENCOUNTER — OFFICE VISIT (OUTPATIENT)
Dept: PRIMARY CARE CLINIC | Age: 69
End: 2019-07-22
Payer: MEDICARE

## 2019-07-22 VITALS
RESPIRATION RATE: 18 BRPM | BODY MASS INDEX: 28.74 KG/M2 | SYSTOLIC BLOOD PRESSURE: 82 MMHG | WEIGHT: 212.2 LBS | DIASTOLIC BLOOD PRESSURE: 58 MMHG | HEART RATE: 80 BPM | HEIGHT: 72 IN

## 2019-07-22 DIAGNOSIS — M54.50 CHRONIC MIDLINE LOW BACK PAIN WITHOUT SCIATICA: ICD-10-CM

## 2019-07-22 DIAGNOSIS — I95.1 ORTHOSTATIC HYPOTENSION: ICD-10-CM

## 2019-07-22 DIAGNOSIS — E03.9 ACQUIRED HYPOTHYROIDISM: ICD-10-CM

## 2019-07-22 DIAGNOSIS — G47.00 INSOMNIA, UNSPECIFIED TYPE: ICD-10-CM

## 2019-07-22 DIAGNOSIS — M15.9 PRIMARY OSTEOARTHRITIS INVOLVING MULTIPLE JOINTS: ICD-10-CM

## 2019-07-22 DIAGNOSIS — G89.29 CHRONIC MIDLINE LOW BACK PAIN WITHOUT SCIATICA: ICD-10-CM

## 2019-07-22 DIAGNOSIS — F41.9 ANXIETY: ICD-10-CM

## 2019-07-22 DIAGNOSIS — R42 LIGHT HEADEDNESS: Primary | ICD-10-CM

## 2019-07-22 PROCEDURE — 99214 OFFICE O/P EST MOD 30 MIN: CPT | Performed by: NURSE PRACTITIONER

## 2019-07-22 ASSESSMENT — ENCOUNTER SYMPTOMS
SHORTNESS OF BREATH: 0
VOMITING: 0
NAUSEA: 0
BACK PAIN: 1
WHEEZING: 0
TROUBLE SWALLOWING: 0
COUGH: 0
SORE THROAT: 0
CONSTIPATION: 0
SINUS PRESSURE: 0
ABDOMINAL PAIN: 0
DIARRHEA: 0
BLOOD IN STOOL: 0

## 2019-07-22 NOTE — PROGRESS NOTES
colonoscopy  06/04/2024    Pneumococcal 65+ years Vaccine  Completed    Hepatitis C screen  Completed

## 2019-07-22 NOTE — PROGRESS NOTES
Negative for confusion. The patient is not nervous/anxious. Objective:     Physical Exam   Constitutional: He is oriented to person, place, and time. He appears well-developed and well-nourished. HENT:   Head: Normocephalic. Eyes: Pupils are equal, round, and reactive to light. Conjunctivae and EOM are normal.   Neck: Normal range of motion. Cardiovascular: Normal rate, regular rhythm, normal heart sounds and intact distal pulses. No murmur heard. Pulmonary/Chest: Effort normal and breath sounds normal. He has no wheezes. Abdominal: Soft. Bowel sounds are normal. He exhibits no distension. Musculoskeletal: Normal range of motion. Neurological: He is alert and oriented to person, place, and time. Skin: Skin is warm and dry. Psychiatric: He has a normal mood and affect. His behavior is normal. Judgment and thought content normal.     BP (!) 82/58 (Site: Left Upper Arm, Position: Sitting, Cuff Size: Large Adult)   Pulse 80   Resp 18   Ht 6' (1.829 m)   Wt 212 lb 3.2 oz (96.3 kg)   BMI 28.78 kg/m²     Assessment:       Diagnosis Orders   1. Light headedness  [facility]) - Kehinde Acuna MD, Cardiology[facility]   2. Insomnia, unspecified type     3. Orthostatic hypotension  [facility]) - Kehined Acuna MD, Cardiology, [facility]   4. Primary osteoarthritis involving multiple joints     5. Acquired hypothyroidism     6. Anxiety     7. Chronic midline low back pain without sciatica               Plan:      Return in about 4 months (around 11/22/2019) for OA, light headedness.     Orders Placed This Encounter   Procedures    [facility]) - Kehinde Acuna MD, Cardiology, [facility]     Referral Priority:   Routine     Referral Type:   Eval and Treat     Referral Reason:   Specialty Services Required     Referred to Provider:   Mackenzie Miller MD     Requested Specialty:   Cardiology     Number of Visits Requested:   1     Light headedness, low BP-referral to cardio, discussed possible testing and treatment options. Advised to increase fluid intake to at least 8, 8 ounce glasses of water per day   Insomnia, anxiety-well controlled with current meds  Hypothyroidism-last TSH 6 months ago stable, continue levothyroxine  OA, back pain-unchanged, cont current meds  Of the 25 minute duration appointment visit, KARELY Lyons spent at least 50% of the face-to-face time in counseling, explanation of diagnosis, planning of further management, and answering all questions. Patient given educational materials - see patient instructions. Discussed use, benefit, and side effects of prescribed medications. All patientquestions answered. Pt voiced understanding. Reviewed health maintenance. Instructedto continue current medications, diet and exercise. Patient agreed with treatmentplan. Follow up as directed.      Electronicallysigned by [address], APRN - CNP on 7/22/2019 at 2:00 PM

## 2019-08-07 DIAGNOSIS — G47.00 INSOMNIA, UNSPECIFIED TYPE: ICD-10-CM

## 2019-08-07 RX ORDER — TEMAZEPAM 30 MG/1
CAPSULE ORAL
Qty: 90 CAPSULE | Refills: 0 | Status: SHIPPED | OUTPATIENT
Start: 2019-08-07 | End: 2019-10-29 | Stop reason: SDUPTHER

## 2019-08-12 DIAGNOSIS — F41.9 ANXIETY: ICD-10-CM

## 2019-08-12 DIAGNOSIS — G89.29 CHRONIC MIDLINE LOW BACK PAIN WITHOUT SCIATICA: ICD-10-CM

## 2019-08-12 DIAGNOSIS — M54.50 CHRONIC MIDLINE LOW BACK PAIN WITHOUT SCIATICA: ICD-10-CM

## 2019-08-12 RX ORDER — ALPRAZOLAM 0.5 MG/1
TABLET ORAL
Qty: 180 TABLET | Refills: 0 | Status: SHIPPED | OUTPATIENT
Start: 2019-08-12 | End: 2019-08-19 | Stop reason: SDUPTHER

## 2019-08-12 RX ORDER — GABAPENTIN 300 MG/1
300 CAPSULE ORAL 4 TIMES DAILY
Qty: 360 CAPSULE | Refills: 3 | Status: SHIPPED | OUTPATIENT
Start: 2019-08-12 | End: 2020-10-05

## 2019-08-14 ENCOUNTER — TELEPHONE (OUTPATIENT)
Dept: PRIMARY CARE CLINIC | Age: 69
End: 2019-08-14

## 2019-08-14 DIAGNOSIS — K08.89 PAIN, DENTAL: Primary | ICD-10-CM

## 2019-08-15 RX ORDER — HYDROCODONE BITARTRATE AND ACETAMINOPHEN 5; 325 MG/1; MG/1
1 TABLET ORAL EVERY 8 HOURS PRN
Qty: 15 TABLET | Refills: 0 | Status: SHIPPED | OUTPATIENT
Start: 2019-08-15 | End: 2019-08-20

## 2019-08-19 DIAGNOSIS — F41.9 ANXIETY: ICD-10-CM

## 2019-08-19 RX ORDER — ALPRAZOLAM 0.5 MG/1
TABLET ORAL
Qty: 180 TABLET | Refills: 0 | Status: SHIPPED | OUTPATIENT
Start: 2019-08-19 | End: 2019-12-26

## 2019-08-19 NOTE — TELEPHONE ENCOUNTER
Last ov 7-22-19  Health Maintenance   Topic Date Due    AAA screen  1950    DTaP/Tdap/Td vaccine (1 - Tdap) 06/29/1969    Shingles Vaccine (2 of 3) 12/22/2017    A1C test (Diabetic or Prediabetic)  02/13/2018    Annual Wellness Visit (AWV)  07/22/2021 (Originally 6/29/2013)    Flu vaccine (1) 09/01/2019    TSH testing  11/07/2019    Lipid screen  11/07/2023    Colon cancer screen colonoscopy  06/04/2024    Pneumococcal 65+ years Vaccine  Completed    Hepatitis C screen  Completed             (applicable per patient's age: Cancer Screenings, Depression Screening, Fall Risk Screening, Immunizations)    Hemoglobin A1C (%)   Date Value   02/13/2017 5.9   11/10/2016 6.3   07/07/2016 5.9     LDL Cholesterol (mg/dL)   Date Value   11/07/2018 42     LDL Calculated (mg/dL)   Date Value   06/03/2016 49     AST (U/L)   Date Value   11/07/2018 15     ALT (U/L)   Date Value   11/07/2018 13     BUN (mg/dL)   Date Value   11/07/2018 15      (goal A1C is < 7)   (goal LDL is <100) need 30-50% reduction from baseline     BP Readings from Last 3 Encounters:   07/22/19 (!) 82/58   03/19/19 (!) 80/58   11/19/18 108/72    (goal /80)      All Future Testing planned in [facility]:      Next Visit Date:  Future Appointments   Date Time Provider Iggy Leger   8/22/2019  1:45 PM Sarah Goodman MD [facility] [id number] [facility]   11/25/2019  1:00 PM LINUS Rees - CNP [facility]            Patient Active Problem List:     Hyperlipidemia     Anxiety     Insomnia     Osteoarthritis     Sleep apnea     Chronic midline low back pain without sciatica     Gastroesophageal reflux disease     Therapeutic opioid induced constipation     Acquired hypothyroidism     Chronic tension-type headache, not intractable     Hx-TIA (transient ischemic attack)     Fibromyalgia     Orthostatic hypotension

## 2019-08-27 DIAGNOSIS — Z86.73 HX-TIA (TRANSIENT ISCHEMIC ATTACK): ICD-10-CM

## 2019-08-27 RX ORDER — TOPIRAMATE 50 MG/1
50 TABLET, FILM COATED ORAL 2 TIMES DAILY
Qty: 180 TABLET | Refills: 1 | Status: SHIPPED | OUTPATIENT
Start: 2019-08-27 | End: 2019-10-22 | Stop reason: SDUPTHER

## 2019-09-08 ENCOUNTER — NURSE TRIAGE (OUTPATIENT)
Dept: OTHER | Age: 69
End: 2019-09-08

## 2019-09-08 NOTE — TELEPHONE ENCOUNTER
Reason for Disposition   Pain radiates into the thigh or further down the leg now    Answer Assessment - Initial Assessment Questions  1. MECHANISM: \"How did the injury happen? \" (Consider the possibility of domestic violence or elder abuse)      Moving furniture     2. ONSET: \"When did the injury happen? \" (Minutes or hours ago)      Yesterday    3. LOCATION: \"What part of the back is injured? \"      Lower back     4. SEVERITY: \"Can you move the back normally? \"      Cannot stand up. 5. PAIN: \"Is there any pain? \" If so, ask: \"How bad is the pain? \"   (Scale 1-10; or mild, moderate, severe)      Severe. 6. CORD SYMPTOMS: Any weakness or numbness of the arms or legs? \"      No.    7. SIZE: For cuts, bruises, or swelling, ask: \"How large is it? \" (e.g., inches or centimeters)      *No Answer*  8. TETANUS: For any breaks in the skin, ask: \"When was the last tetanus booster? \"      *No Answer*  9. OTHER SYMPTOMS: \"Do you have any other symptoms? \" (e.g., abdominal pain, blood in urine)      *No Answer*  10. PREGNANCY: \"Is there any chance you are pregnant? \" \"When was your last menstrual period? \"        *No Answer*    Protocols used: [facility]

## 2019-09-10 ENCOUNTER — OFFICE VISIT (OUTPATIENT)
Dept: PRIMARY CARE CLINIC | Age: 69
End: 2019-09-10
Payer: MEDICARE

## 2019-09-10 ENCOUNTER — HOSPITAL ENCOUNTER (OUTPATIENT)
Dept: GENERAL RADIOLOGY | Age: 69
Discharge: HOME | End: 2019-09-12
Payer: MEDICARE

## 2019-09-10 ENCOUNTER — HOSPITAL ENCOUNTER (OUTPATIENT)
Age: 69
Discharge: HOME | End: 2019-09-12
Payer: MEDICARE

## 2019-09-10 VITALS
RESPIRATION RATE: 18 BRPM | HEIGHT: 72 IN | HEART RATE: 76 BPM | SYSTOLIC BLOOD PRESSURE: 92 MMHG | WEIGHT: 211 LBS | BODY MASS INDEX: 28.58 KG/M2 | DIASTOLIC BLOOD PRESSURE: 68 MMHG

## 2019-09-10 DIAGNOSIS — M15.9 PRIMARY OSTEOARTHRITIS INVOLVING MULTIPLE JOINTS: ICD-10-CM

## 2019-09-10 DIAGNOSIS — M54.50 ACUTE MIDLINE LOW BACK PAIN WITHOUT SCIATICA: ICD-10-CM

## 2019-09-10 DIAGNOSIS — M54.50 ACUTE MIDLINE LOW BACK PAIN WITHOUT SCIATICA: Primary | ICD-10-CM

## 2019-09-10 PROCEDURE — 96372 THER/PROPH/DIAG INJ SC/IM: CPT | Performed by: NURSE PRACTITIONER

## 2019-09-10 PROCEDURE — 72070 X-RAY EXAM THORAC SPINE 2VWS: CPT

## 2019-09-10 PROCEDURE — 72100 X-RAY EXAM L-S SPINE 2/3 VWS: CPT

## 2019-09-10 PROCEDURE — 99214 OFFICE O/P EST MOD 30 MIN: CPT | Performed by: NURSE PRACTITIONER

## 2019-09-10 PROCEDURE — 72074 X-RAY EXAM THORAC SPINE4/>VW: CPT

## 2019-09-10 RX ORDER — TRAMADOL HYDROCHLORIDE 50 MG/1
50 TABLET ORAL EVERY 6 HOURS PRN
COMMUNITY
End: 2021-04-12 | Stop reason: ALTCHOICE

## 2019-09-10 RX ORDER — HYDROCODONE BITARTRATE AND ACETAMINOPHEN 5; 325 MG/1; MG/1
1 TABLET ORAL EVERY 8 HOURS PRN
COMMUNITY
End: 2019-09-10 | Stop reason: SDUPTHER

## 2019-09-10 RX ORDER — PREDNISONE 50 MG/1
50 TABLET ORAL DAILY
Qty: 10 TABLET | Refills: 0 | Status: SHIPPED | OUTPATIENT
Start: 2019-09-10 | End: 2019-09-20

## 2019-09-10 RX ORDER — METHYLPREDNISOLONE ACETATE 80 MG/ML
80 INJECTION, SUSPENSION INTRA-ARTICULAR; INTRALESIONAL; INTRAMUSCULAR; SOFT TISSUE ONCE
Status: COMPLETED | OUTPATIENT
Start: 2019-09-10 | End: 2019-09-10

## 2019-09-10 RX ORDER — TIZANIDINE 4 MG/1
4 TABLET ORAL EVERY 8 HOURS PRN
Qty: 30 TABLET | Refills: 0 | Status: SHIPPED | OUTPATIENT
Start: 2019-09-10 | End: 2020-11-30

## 2019-09-10 RX ORDER — HYDROCODONE BITARTRATE AND ACETAMINOPHEN 5; 325 MG/1; MG/1
1 TABLET ORAL EVERY 8 HOURS PRN
Qty: 21 TABLET | Refills: 0 | Status: SHIPPED | OUTPATIENT
Start: 2019-09-10 | End: 2019-09-17

## 2019-09-10 RX ADMIN — METHYLPREDNISOLONE ACETATE 80 MG: 80 INJECTION, SUSPENSION INTRA-ARTICULAR; INTRALESIONAL; INTRAMUSCULAR; SOFT TISSUE at 10:58

## 2019-09-10 ASSESSMENT — ENCOUNTER SYMPTOMS
COUGH: 0
BLOOD IN STOOL: 0
VOMITING: 0
DIARRHEA: 0
SINUS PRESSURE: 0
WHEEZING: 0
NAUSEA: 0
SHORTNESS OF BREATH: 0
BACK PAIN: 1
SORE THROAT: 0
CONSTIPATION: 0
TROUBLE SWALLOWING: 0
ABDOMINAL PAIN: 0

## 2019-09-10 NOTE — PROGRESS NOTES
simvastatin (ZOCOR) 80 MG tablet TAKE 1 TABLET DAILY AT BEDTIME 90 tablet 3    fluticasone (FLONASE) 50 MCG/ACT nasal spray USE 2 SPRAYS IN EACH NOSTRIL DAILY 48 g 5    levothyroxine (SYNTHROID) 75 MCG tablet TAKE ONE TABLET BY MOUTH ONCE DAILY 90 tablet 3    omeprazole (PRILOSEC) 40 MG delayed release capsule Take 1 capsule by mouth daily 90 capsule 3    citalopram (CELEXA) 20 MG tablet TAKE ONE TABLET BY MOUTH ONCE DAILY 90 tablet 3    celecoxib (CELEBREX) 100 MG capsule Take 1 capsule by mouth 2 times daily 180 capsule 3    ezetimibe (ZETIA) 10 MG tablet TAKE 1 TABLET DAILY 90 tablet 3    Blood Pressure Monitoring (BLOOD PRESS MONITOR/M-L CUFF) MISC 1 kit by Does not apply route daily. 1 each 0     No current facility-administered medications for this visit. Allergies   Allergen Reactions    Pcn [Penicillins]        Health Maintenance   Topic Date Due    AAA screen  1950    DTaP/Tdap/Td vaccine (1 - Tdap) 06/29/1969    Shingles Vaccine (2 of 3) 12/22/2017    A1C test (Diabetic or Prediabetic)  02/13/2018    Flu vaccine (1) 09/01/2019    Annual Wellness Visit (AWV)  07/22/2021 (Originally 6/29/2013)    TSH testing  11/07/2019    Lipid screen  11/07/2023    Colon cancer screen colonoscopy  06/04/2024    Pneumococcal 65+ years Vaccine  Completed    Hepatitis C screen  Completed       Subjective:      Review of Systems   Constitutional: Negative for activity change, appetite change, chills, fatigue, fever and unexpected weight change. HENT: Negative for congestion, ear pain, hearing loss, sinus pressure, sore throat and trouble swallowing. Eyes: Negative for visual disturbance. Respiratory: Negative for cough, shortness of breath and wheezing. Cardiovascular: Negative for chest pain, palpitations and leg swelling. Gastrointestinal: Negative for abdominal pain, blood in stool, constipation, diarrhea, nausea and vomiting.    Endocrine: Negative for cold intolerance, heat if symptoms worsen or fail to improve. Orders Placed This Encounter   Procedures    XR THORACIC SPINE (2 VIEWS)     Standing Status:   Future     Number of Occurrences:   1     Standing Expiration Date:   9/10/2020     Order Specific Question:   Reason for exam:     Answer:   back pain    XR LUMBAR SPINE (2-3 VIEWS)     Standing Status:   Future     Number of Occurrences:   1     Standing Expiration Date:   9/9/2020     Order Specific Question:   Reason for exam:     Answer:   back pain        Orders Placed This Encounter   Medications    methylPREDNISolone acetate (DEPO-MEDROL) injection 80 mg    predniSONE (DELTASONE) 50 MG tablet     Sig: Take 1 tablet by mouth daily for 10 days     Dispense:  10 tablet     Refill:  0    tiZANidine (ZANAFLEX) 4 MG tablet     Sig: Take 1 tablet by mouth every 8 hours as needed (back pain)     Dispense:  30 tablet     Refill:  0    HYDROcodone-acetaminophen (NORCO) 5-325 MG per tablet     Sig: Take 1 tablet by mouth every 8 hours as needed for Pain for up to 7 days. Indications: was given this med for dental pain     Dispense:  21 tablet     Refill:  0     Reduce doses taken as pain becomes manageable      Back pain-check xray, location of pain appears to be around t12-L1, continue celebrex. Depo inj in office, start short course oral prednisone tomorrow, heat/ice as needed. Norco sparingly for severe pain, zanaflex as needed. With hx of osteoarthritis discussed suspect this will be seen on xray, also need to r/o compression fx. Pending results may benefit from PT referral  Of the 25 minute duration appointment visit, KARELY Reyes spent at least 50% of the face-to-face time in counseling, explanation of diagnosis, planning of further management, and answering all questions. Patient given educational materials - see patient instructions. Discussed use, benefit, and side effects of prescribed medications. All patientquestions answered.  Pt voiced

## 2019-09-12 DIAGNOSIS — M15.9 PRIMARY OSTEOARTHRITIS INVOLVING MULTIPLE JOINTS: ICD-10-CM

## 2019-09-12 DIAGNOSIS — M54.50 ACUTE MIDLINE LOW BACK PAIN WITHOUT SCIATICA: Primary | ICD-10-CM

## 2019-09-17 ENCOUNTER — HOSPITAL ENCOUNTER (OUTPATIENT)
Dept: PHYSICAL THERAPY | Facility: CLINIC | Age: 69
Setting detail: THERAPIES SERIES
Discharge: HOME | End: 2019-09-17
Payer: MEDICARE

## 2019-09-17 PROCEDURE — 97140 MANUAL THERAPY 1/> REGIONS: CPT

## 2019-09-17 PROCEDURE — 97161 PT EVAL LOW COMPLEX 20 MIN: CPT

## 2019-09-19 ENCOUNTER — HOSPITAL ENCOUNTER (OUTPATIENT)
Dept: PHYSICAL THERAPY | Facility: CLINIC | Age: 69
Setting detail: THERAPIES SERIES
Discharge: HOME | End: 2019-09-19
Payer: MEDICARE

## 2019-09-19 PROCEDURE — 97113 AQUATIC THERAPY/EXERCISES: CPT

## 2019-09-19 PROCEDURE — 97140 MANUAL THERAPY 1/> REGIONS: CPT

## 2019-09-24 ENCOUNTER — HOSPITAL ENCOUNTER (OUTPATIENT)
Dept: PHYSICAL THERAPY | Facility: CLINIC | Age: 69
Setting detail: THERAPIES SERIES
Discharge: HOME | End: 2019-09-24
Payer: MEDICARE

## 2019-09-24 ENCOUNTER — APPOINTMENT (OUTPATIENT)
Dept: PHYSICAL THERAPY | Facility: CLINIC | Age: 69
End: 2019-09-24
Payer: MEDICARE

## 2019-09-26 ENCOUNTER — HOSPITAL ENCOUNTER (OUTPATIENT)
Dept: PHYSICAL THERAPY | Facility: CLINIC | Age: 69
Setting detail: THERAPIES SERIES
Discharge: HOME | End: 2019-09-26
Payer: MEDICARE

## 2019-09-26 ENCOUNTER — APPOINTMENT (OUTPATIENT)
Dept: PHYSICAL THERAPY | Facility: CLINIC | Age: 69
End: 2019-09-26
Payer: MEDICARE

## 2019-10-22 DIAGNOSIS — Z86.73 HX-TIA (TRANSIENT ISCHEMIC ATTACK): ICD-10-CM

## 2019-10-22 RX ORDER — TOPIRAMATE 50 MG/1
50 TABLET, FILM COATED ORAL 2 TIMES DAILY
Qty: 180 TABLET | Refills: 1 | Status: SHIPPED | OUTPATIENT
Start: 2019-10-22 | End: 2020-05-26 | Stop reason: SDUPTHER

## 2019-10-29 DIAGNOSIS — G47.00 INSOMNIA, UNSPECIFIED TYPE: ICD-10-CM

## 2019-10-29 RX ORDER — TEMAZEPAM 30 MG/1
CAPSULE ORAL
Qty: 90 CAPSULE | Refills: 0 | Status: SHIPPED | OUTPATIENT
Start: 2019-10-29 | End: 2019-10-31 | Stop reason: SDUPTHER

## 2019-10-31 DIAGNOSIS — G47.00 INSOMNIA, UNSPECIFIED TYPE: ICD-10-CM

## 2019-10-31 RX ORDER — TEMAZEPAM 30 MG/1
CAPSULE ORAL
Qty: 90 CAPSULE | Refills: 0 | Status: SHIPPED | OUTPATIENT
Start: 2019-10-31 | End: 2019-12-26 | Stop reason: SDUPTHER

## 2019-11-22 PROBLEM — I51.89 DIASTOLIC DYSFUNCTION: Status: ACTIVE | Noted: 2019-11-22

## 2019-12-26 ENCOUNTER — OFFICE VISIT (OUTPATIENT)
Dept: PRIMARY CARE CLINIC | Age: 69
End: 2019-12-26
Payer: MEDICARE

## 2019-12-26 VITALS
WEIGHT: 211.8 LBS | HEART RATE: 72 BPM | DIASTOLIC BLOOD PRESSURE: 62 MMHG | SYSTOLIC BLOOD PRESSURE: 108 MMHG | BODY MASS INDEX: 28.69 KG/M2 | HEIGHT: 72 IN | RESPIRATION RATE: 18 BRPM

## 2019-12-26 DIAGNOSIS — G89.29 CHRONIC MIDLINE LOW BACK PAIN WITHOUT SCIATICA: ICD-10-CM

## 2019-12-26 DIAGNOSIS — G47.00 INSOMNIA, UNSPECIFIED TYPE: ICD-10-CM

## 2019-12-26 DIAGNOSIS — E78.2 MIXED HYPERLIPIDEMIA: ICD-10-CM

## 2019-12-26 DIAGNOSIS — Z13.0 SCREENING FOR IRON DEFICIENCY ANEMIA: ICD-10-CM

## 2019-12-26 DIAGNOSIS — E03.9 ACQUIRED HYPOTHYROIDISM: ICD-10-CM

## 2019-12-26 DIAGNOSIS — Z12.5 ENCOUNTER FOR PROSTATE CANCER SCREENING: ICD-10-CM

## 2019-12-26 DIAGNOSIS — M25.511 PAIN OF BOTH SHOULDER JOINTS: ICD-10-CM

## 2019-12-26 DIAGNOSIS — E78.1 HYPERTRIGLYCERIDEMIA: ICD-10-CM

## 2019-12-26 DIAGNOSIS — M25.512 PAIN OF BOTH SHOULDER JOINTS: ICD-10-CM

## 2019-12-26 DIAGNOSIS — M15.9 PRIMARY OSTEOARTHRITIS INVOLVING MULTIPLE JOINTS: Primary | ICD-10-CM

## 2019-12-26 DIAGNOSIS — M54.50 CHRONIC MIDLINE LOW BACK PAIN WITHOUT SCIATICA: ICD-10-CM

## 2019-12-26 DIAGNOSIS — I95.1 ORTHOSTATIC HYPOTENSION: ICD-10-CM

## 2019-12-26 PROCEDURE — 99214 OFFICE O/P EST MOD 30 MIN: CPT | Performed by: NURSE PRACTITIONER

## 2019-12-26 RX ORDER — TEMAZEPAM 30 MG/1
CAPSULE ORAL
Qty: 90 CAPSULE | Refills: 0 | Status: SHIPPED | OUTPATIENT
Start: 2019-12-26 | End: 2020-03-19

## 2019-12-26 RX ORDER — CELECOXIB 100 MG/1
100 CAPSULE ORAL 2 TIMES DAILY
Qty: 180 CAPSULE | Refills: 3 | Status: SHIPPED | OUTPATIENT
Start: 2019-12-26 | End: 2020-07-21

## 2019-12-26 RX ORDER — EZETIMIBE 10 MG/1
TABLET ORAL
Qty: 90 TABLET | Refills: 3 | Status: SHIPPED | OUTPATIENT
Start: 2019-12-26 | End: 2021-02-08

## 2019-12-26 ASSESSMENT — ENCOUNTER SYMPTOMS
BLOOD IN STOOL: 0
NAUSEA: 0
COUGH: 0
ABDOMINAL PAIN: 0
SHORTNESS OF BREATH: 0
VOMITING: 0
SINUS PRESSURE: 0
WHEEZING: 0
BACK PAIN: 1
DIARRHEA: 0
SORE THROAT: 0
TROUBLE SWALLOWING: 0
CONSTIPATION: 0

## 2020-01-27 ENCOUNTER — HOSPITAL ENCOUNTER (OUTPATIENT)
Age: 70
Discharge: HOME | End: 2020-01-27
Payer: MEDICARE

## 2020-01-27 LAB
ABSOLUTE EOS #: 0.2 K/UL (ref 0–0.44)
ABSOLUTE IMMATURE GRANULOCYTE: <0.03 K/UL (ref 0–0.3)
ABSOLUTE LYMPH #: 2.05 K/UL (ref 1.1–3.7)
ABSOLUTE MONO #: 0.46 K/UL (ref 0.1–1.2)
ALBUMIN SERPL-MCNC: 4.2 G/DL (ref 3.5–5.2)
ALBUMIN/GLOBULIN RATIO: 1.8 (ref 1–2.5)
ALP BLD-CCNC: 53 U/L (ref 40–129)
ALT SERPL-CCNC: 18 U/L (ref 5–41)
ANION GAP SERPL CALCULATED.3IONS-SCNC: 10 MMOL/L (ref 9–17)
AST SERPL-CCNC: 15 U/L
BASOPHILS # BLD: 1 % (ref 0–2)
BASOPHILS ABSOLUTE: 0.05 K/UL (ref 0–0.2)
BILIRUB SERPL-MCNC: 0.58 MG/DL (ref 0.3–1.2)
BUN BLDV-MCNC: 10 MG/DL (ref 8–23)
BUN/CREAT BLD: ABNORMAL (ref 9–20)
CALCIUM SERPL-MCNC: 8.6 MG/DL (ref 8.6–10.4)
CHLORIDE BLD-SCNC: 110 MMOL/L (ref 98–107)
CHOLESTEROL/HDL RATIO: 2.7
CHOLESTEROL: 122 MG/DL
CO2: 23 MMOL/L (ref 20–31)
CREAT SERPL-MCNC: 1.19 MG/DL (ref 0.7–1.2)
DIFFERENTIAL TYPE: ABNORMAL
EOSINOPHILS RELATIVE PERCENT: 3 % (ref 1–4)
GFR AFRICAN AMERICAN: >60 ML/MIN
GFR NON-AFRICAN AMERICAN: >60 ML/MIN
GFR SERPL CREATININE-BSD FRML MDRD: ABNORMAL ML/MIN/{1.73_M2}
GFR SERPL CREATININE-BSD FRML MDRD: ABNORMAL ML/MIN/{1.73_M2}
GLUCOSE BLD-MCNC: 116 MG/DL (ref 70–99)
HCT VFR BLD CALC: 50.8 % (ref 40.7–50.3)
HDLC SERPL-MCNC: 45 MG/DL
HEMOGLOBIN: 16 G/DL (ref 13–17)
IMMATURE GRANULOCYTES: 0 %
LDL CHOLESTEROL: 48 MG/DL (ref 0–130)
LYMPHOCYTES # BLD: 28 % (ref 24–43)
MCH RBC QN AUTO: 30.7 PG (ref 25.2–33.5)
MCHC RBC AUTO-ENTMCNC: 31.5 G/DL (ref 28.4–34.8)
MCV RBC AUTO: 97.3 FL (ref 82.6–102.9)
MONOCYTES # BLD: 6 % (ref 3–12)
NRBC AUTOMATED: 0 PER 100 WBC
PDW BLD-RTO: 12.8 % (ref 11.8–14.4)
PLATELET # BLD: 148 K/UL (ref 138–453)
PLATELET ESTIMATE: ABNORMAL
PMV BLD AUTO: 12 FL (ref 8.1–13.5)
POTASSIUM SERPL-SCNC: 4.3 MMOL/L (ref 3.7–5.3)
PROSTATE SPECIFIC ANTIGEN: 1.35 UG/L
RBC # BLD: 5.22 M/UL (ref 4.21–5.77)
RBC # BLD: ABNORMAL 10*6/UL
SEG NEUTROPHILS: 62 % (ref 36–65)
SEGMENTED NEUTROPHILS ABSOLUTE COUNT: 4.43 K/UL (ref 1.5–8.1)
SODIUM BLD-SCNC: 143 MMOL/L (ref 135–144)
TOTAL PROTEIN: 6.5 G/DL (ref 6.4–8.3)
TRIGL SERPL-MCNC: 147 MG/DL
TSH SERPL DL<=0.05 MIU/L-ACNC: 14.66 MIU/L (ref 0.3–5)
VLDLC SERPL CALC-MCNC: NORMAL MG/DL (ref 1–30)
WBC # BLD: 7.2 K/UL (ref 3.5–11.3)
WBC # BLD: ABNORMAL 10*3/UL

## 2020-01-27 PROCEDURE — 36415 COLL VENOUS BLD VENIPUNCTURE: CPT

## 2020-01-27 PROCEDURE — 80061 LIPID PANEL: CPT

## 2020-01-27 PROCEDURE — 84443 ASSAY THYROID STIM HORMONE: CPT

## 2020-01-27 PROCEDURE — G0103 PSA SCREENING: HCPCS

## 2020-01-27 PROCEDURE — 85025 COMPLETE CBC W/AUTO DIFF WBC: CPT

## 2020-01-27 PROCEDURE — 80053 COMPREHEN METABOLIC PANEL: CPT

## 2020-01-27 RX ORDER — LEVOTHYROXINE SODIUM 88 UG/1
TABLET ORAL
Qty: 90 TABLET | Refills: 3 | Status: SHIPPED | OUTPATIENT
Start: 2020-01-27 | End: 2021-01-21

## 2020-03-30 RX ORDER — TRAZODONE HYDROCHLORIDE 50 MG/1
50 TABLET ORAL NIGHTLY
Qty: 30 TABLET | Refills: 5 | Status: SHIPPED | OUTPATIENT
Start: 2020-03-30 | End: 2020-04-27 | Stop reason: SDUPTHER

## 2020-03-30 RX ORDER — TEMAZEPAM 30 MG/1
CAPSULE ORAL
Qty: 90 CAPSULE | Refills: 0 | Status: SHIPPED | OUTPATIENT
Start: 2020-03-30 | End: 2020-06-29 | Stop reason: SDUPTHER

## 2020-03-30 NOTE — TELEPHONE ENCOUNTER
Patient wondering if you can up the dose or change medication. Doesn't seem to work like it used too, has a hard time falling asleep.

## 2020-04-23 ASSESSMENT — PATIENT HEALTH QUESTIONNAIRE - PHQ9
1. LITTLE INTEREST OR PLEASURE IN DOING THINGS: 1
SUM OF ALL RESPONSES TO PHQ QUESTIONS 1-9: 2
SUM OF ALL RESPONSES TO PHQ QUESTIONS 1-9: 2
2. FEELING DOWN, DEPRESSED OR HOPELESS: 1
SUM OF ALL RESPONSES TO PHQ9 QUESTIONS 1 & 2: 2

## 2020-04-27 ENCOUNTER — VIRTUAL VISIT (OUTPATIENT)
Dept: PRIMARY CARE CLINIC | Age: 70
End: 2020-04-27
Payer: MEDICARE

## 2020-04-27 PROCEDURE — 99443 PR PHYS/QHP TELEPHONE EVALUATION 21-30 MIN: CPT | Performed by: NURSE PRACTITIONER

## 2020-04-27 RX ORDER — BUSPIRONE HYDROCHLORIDE 10 MG/1
10 TABLET ORAL 3 TIMES DAILY
Qty: 270 TABLET | Refills: 3 | Status: SHIPPED | OUTPATIENT
Start: 2020-04-27 | End: 2021-03-30

## 2020-04-27 RX ORDER — OXYBUTYNIN CHLORIDE 10 MG/1
10 TABLET, EXTENDED RELEASE ORAL DAILY
Qty: 90 TABLET | Refills: 3 | Status: SHIPPED | OUTPATIENT
Start: 2020-04-27 | End: 2021-01-11 | Stop reason: SDUPTHER

## 2020-04-27 RX ORDER — TRAZODONE HYDROCHLORIDE 100 MG/1
100 TABLET ORAL NIGHTLY
Qty: 90 TABLET | Refills: 3 | Status: SHIPPED | OUTPATIENT
Start: 2020-04-27 | End: 2020-09-21 | Stop reason: SDUPTHER

## 2020-05-19 ENCOUNTER — TELEPHONE (OUTPATIENT)
Dept: PRIMARY CARE CLINIC | Age: 70
End: 2020-05-19

## 2020-05-19 NOTE — TELEPHONE ENCOUNTER
Patient called and said he needs a letter telling the court that he can not attend [facility] and he is under doctors care.      Yareli Malave duty May 27th  Fax # [phone]  Attention Milton Rey

## 2020-05-26 RX ORDER — CITALOPRAM 20 MG/1
TABLET ORAL
Qty: 90 TABLET | Refills: 3 | Status: SHIPPED | OUTPATIENT
Start: 2020-05-26 | End: 2021-04-12 | Stop reason: DRUGHIGH

## 2020-05-26 RX ORDER — TOPIRAMATE 50 MG/1
50 TABLET, FILM COATED ORAL 2 TIMES DAILY
Qty: 180 TABLET | Refills: 3 | Status: SHIPPED | OUTPATIENT
Start: 2020-05-26 | End: 2021-04-12 | Stop reason: DRUGHIGH

## 2020-05-26 NOTE — TELEPHONE ENCOUNTER
Last OV 4/27/20  Next OV 8/27/20    Health Maintenance   Topic Date Due    AAA screen  1950    DTaP/Tdap/Td vaccine (1 - Tdap) 06/29/1969    Shingles Vaccine (2 of 3) 12/22/2017    A1C test (Diabetic or Prediabetic)  02/13/2018    Annual Wellness Visit (AWV)  07/22/2021 (Originally 5/29/2019)    Lipid screen  01/27/2021    TSH testing  01/27/2021    Colon cancer screen colonoscopy  06/04/2024    Flu vaccine  Completed    Pneumococcal 65+ years Vaccine  Completed    Hepatitis C screen  Completed    Hepatitis A vaccine  Aged Out    Hepatitis B vaccine  Aged Out    Hib vaccine  Aged Out    Meningococcal (ACWY) vaccine  Aged Out             (applicable per patient's age: Cancer Screenings, Depression Screening, Fall Risk Screening, Immunizations)    Hemoglobin A1C (%)   Date Value   02/13/2017 5.9   11/10/2016 6.3   07/07/2016 5.9     LDL Cholesterol (mg/dL)   Date Value   01/27/2020 48     LDL Calculated (mg/dL)   Date Value   06/03/2016 49     AST (U/L)   Date Value   01/27/2020 15     ALT (U/L)   Date Value   01/27/2020 18     BUN (mg/dL)   Date Value   01/27/2020 10      (goal A1C is < 7)   (goal LDL is <100) need 30-50% reduction from baseline     BP Readings from Last 3 Encounters:   12/26/19 108/62   11/22/19 120/88   11/22/19 120/88    (goal /80)      All Future Testing planned in [facility]:  Lab Frequency Next Occurrence   Basic Metabolic Panel Once 64/90/8428   Cortisol Once 09/05/2019   TSH Once 08/05/2020       Next Visit Date:  Future Appointments   Date Time Provider Iggy Leger   8/27/2020  1:00 PM Yulia Caceres, APRN - CNP [facility]            Patient Active Problem List:     Hyperlipidemia     Anxiety     Insomnia     Osteoarthritis     Sleep apnea     Chronic midline low back pain without sciatica     Gastroesophageal reflux disease     Therapeutic opioid induced constipation     Acquired hypothyroidism     Chronic tension-type headache, not intractable

## 2020-06-03 RX ORDER — OMEPRAZOLE 40 MG/1
40 CAPSULE, DELAYED RELEASE ORAL DAILY
Qty: 90 CAPSULE | Refills: 3 | Status: SHIPPED | OUTPATIENT
Start: 2020-06-03

## 2020-06-29 RX ORDER — TEMAZEPAM 30 MG/1
CAPSULE ORAL
Qty: 90 CAPSULE | Refills: 0 | Status: SHIPPED | OUTPATIENT
Start: 2020-06-29 | End: 2020-10-06 | Stop reason: SDUPTHER

## 2020-07-20 ENCOUNTER — TELEPHONE (OUTPATIENT)
Dept: PRIMARY CARE CLINIC | Age: 70
End: 2020-07-20

## 2020-07-20 NOTE — TELEPHONE ENCOUNTER
PT called and said PT insurance denied medication Celebrex 100 MG. Said they would cover Meloxicam ? Please advice on what you suggest. Thank you    If ok to switch, needs it sent to [facility].

## 2020-07-21 RX ORDER — MELOXICAM 15 MG/1
15 TABLET ORAL DAILY
Qty: 90 TABLET | Refills: 3 | Status: SHIPPED | OUTPATIENT
Start: 2020-07-21 | End: 2021-06-01

## 2020-07-21 NOTE — TELEPHONE ENCOUNTER
He has been on this medication since November of 2018. Can we do prior auth? Is he willing to switch?

## 2020-09-21 RX ORDER — TRAZODONE HYDROCHLORIDE 100 MG/1
100 TABLET ORAL NIGHTLY
Qty: 90 TABLET | Refills: 3 | Status: SHIPPED | OUTPATIENT
Start: 2020-09-21 | End: 2020-11-23

## 2020-09-21 RX ORDER — SIMVASTATIN 80 MG
TABLET ORAL
Qty: 90 TABLET | Refills: 3 | Status: SHIPPED | OUTPATIENT
Start: 2020-09-21 | End: 2021-09-20

## 2020-09-21 NOTE — TELEPHONE ENCOUNTER
LOV 4/27/20  NOV 9/28/20    Health Maintenance   Topic Date Due    AAA screen  1950    DTaP/Tdap/Td vaccine (1 - Tdap) 06/29/1969    Shingles Vaccine (2 of 3) 12/22/2017    A1C test (Diabetic or Prediabetic)  02/13/2018    Annual Wellness Visit (AWV)  07/22/2021 (Originally 5/29/2019)    Lipid screen  01/27/2021    TSH testing  01/27/2021    Colon cancer screen colonoscopy  06/04/2024    Flu vaccine  Completed    Pneumococcal 65+ years Vaccine  Completed    Hepatitis C screen  Completed    Hepatitis A vaccine  Aged Out    Hepatitis B vaccine  Aged Out    Hib vaccine  Aged Out    Meningococcal (ACWY) vaccine  Aged Out             (applicable per patient's age: Cancer Screenings, Depression Screening, Fall Risk Screening, Immunizations)    Hemoglobin A1C (%)   Date Value   02/13/2017 5.9   11/10/2016 6.3   07/07/2016 5.9     LDL Cholesterol (mg/dL)   Date Value   01/27/2020 48     LDL Calculated (mg/dL)   Date Value   06/03/2016 49     AST (U/L)   Date Value   01/27/2020 15     ALT (U/L)   Date Value   01/27/2020 18     BUN (mg/dL)   Date Value   01/27/2020 10      (goal A1C is < 7)   (goal LDL is <100) need 30-50% reduction from baseline     BP Readings from Last 3 Encounters:   12/26/19 108/62   11/22/19 120/88   11/22/19 120/88    (goal /80)      All Future Testing planned in [facility]:  Lab Frequency Next Occurrence   TSH Once 10/08/2020       Next Visit Date:  Future Appointments   Date Time Provider Iggy Leger   9/28/2020  1:00 PM Yulia Wagner APRN - CNP [facility]            Patient Active Problem List:     Hyperlipidemia     Anxiety     Insomnia     Osteoarthritis     Sleep apnea     Chronic midline low back pain without sciatica     Gastroesophageal reflux disease     Therapeutic opioid induced constipation     Acquired hypothyroidism     Chronic tension-type headache, not intractable     Hx-TIA (transient ischemic attack)     Fibromyalgia     Orthostatic hypotension     Diastolic dysfunction

## 2020-09-28 ENCOUNTER — OFFICE VISIT (OUTPATIENT)
Dept: PRIMARY CARE CLINIC | Age: 70
End: 2020-09-28
Payer: MEDICARE

## 2020-09-28 ENCOUNTER — HOSPITAL ENCOUNTER (OUTPATIENT)
Age: 70
Setting detail: SPECIMEN
Discharge: HOME | End: 2020-09-28
Payer: MEDICARE

## 2020-09-28 VITALS — BODY MASS INDEX: 29.57 KG/M2 | DIASTOLIC BLOOD PRESSURE: 76 MMHG | WEIGHT: 218 LBS | SYSTOLIC BLOOD PRESSURE: 124 MMHG

## 2020-09-28 LAB — TSH SERPL DL<=0.05 MIU/L-ACNC: 0.44 MIU/L (ref 0.3–5)

## 2020-09-28 PROCEDURE — 99214 OFFICE O/P EST MOD 30 MIN: CPT | Performed by: NURSE PRACTITIONER

## 2020-09-28 RX ORDER — FAMOTIDINE 40 MG/1
40 TABLET, FILM COATED ORAL EVERY EVENING
Qty: 90 TABLET | Refills: 3 | Status: SHIPPED | OUTPATIENT
Start: 2020-09-28 | End: 2021-09-09

## 2020-09-28 ASSESSMENT — ENCOUNTER SYMPTOMS
WHEEZING: 0
SHORTNESS OF BREATH: 0
SORE THROAT: 0
ABDOMINAL PAIN: 0
CONSTIPATION: 0
HEARTBURN: 1
SINUS PRESSURE: 0
NAUSEA: 0
TROUBLE SWALLOWING: 0
GLOBUS SENSATION: 1
COUGH: 0
DIARRHEA: 0
BLOOD IN STOOL: 0
VOMITING: 0

## 2020-09-28 NOTE — PROGRESS NOTES
[facility] PRIMARY CARE  [facility] [address] [facility] [address]  [address] [address]  Dept: [phone]  Dept Fax: [phone]    Aaron Felix is a 79 y.o. male who presentstoday for his medical conditions/complaints as noted below. Aaron Felix is c/o of  Chief Complaint   Patient presents with   Kiah Kaiser Other     has had some chest heaviness thinks its due to sleeping with fan          HPI:     Here today for follow up  Asking about hearing loss, has had issue with his right ear since childhood and seems to progressively worsening  He is asking about hearing evaluation and benefits of hearing aids    He reports has been having sensation of chest heaviness upon awakening in AM  He sleep with fan at night and is wondering if this may be contriubting  He feels sensation of \"cotton in back of my throat, like there's a ball of phlegm\"  He has a lot of heartburn with certain foods and has been taking PPI every AM  The symptoms have been going on for the past few months, recently has been every day  He has been working on diet changes    Gastroesophageal Reflux   He complains of chest pain (Pressure upon awakening), globus sensation and heartburn. He reports no abdominal pain, no coughing, no nausea, no sore throat or no wheezing. This is a recurrent problem. The current episode started more than 1 month ago. The problem occurs frequently. The problem has been gradually worsening. The heartburn duration is an hour. The heartburn is located in the substernum. The heartburn is of mild intensity. The heartburn does not wake him from sleep. The heartburn does not limit his activity. The heartburn changes with position. The symptoms are aggravated by certain foods. Pertinent negatives include no fatigue. He has tried a PPI for the symptoms. The treatment provided mild relief.        Hemoglobin A1C (%)   Date Value   2017 5.9   11/10/2016 6.3   2016 5.9             ( goal A1C is < 7)   No results found for: LABMICR  LDL Cholesterol (mg/dL)   Date Value   2020 48   2018 42   2017 47     LDL Calculated (mg/dL)   Date Value   2016 49   2014 54       (goal LDL is <100)   AST (U/L)   Date Value   2020 15     ALT (U/L)   Date Value   2020 18     BUN (mg/dL)   Date Value   2020 10     BP Readings from Last 3 Encounters:   20 124/76   19 108/62   19 120/88          ([id number])    Past Medical History:   Diagnosis Date    Anxiety     Depression     Ear infection     history of    Headache(784.0)     migraines    Hyperlipidemia     Insomnia     Osteoarthritis     Unspecified sleep apnea       Past Surgical History:   Procedure Laterality Date    FOOT SURGERY Right     spur    TONSILLECTOMY AND ADENOIDECTOMY         Family History   Problem Relation Age of Onset    Thyroid Cancer Mother     Migraines Mother     Cancer Mother     High Cholesterol Father     Heart Disease Father           Social History     Tobacco Use    Smoking status: Former Smoker     Packs/day: 3.00     Years: 20.00     Pack years: 60.00     Last attempt to quit:      Years since quittin.7    Smokeless tobacco: Never Used   Substance Use Topics    Alcohol use: Yes     Comment: occ      Current Outpatient Medications   Medication Sig Dispense Refill    famotidine (PEPCID) 40 MG tablet Take 1 tablet by mouth every evening 90 tablet 3    simvastatin (ZOCOR) 80 MG tablet TAKE 1 TABLET DAILY AT BEDTIME 90 tablet 3    midodrine (PROAMATINE) 2.5 MG tablet TAKE 1 TABLET THREE TIMES A  tablet 3    traZODone (DESYREL) 100 MG tablet Take 1 tablet by mouth nightly 90 tablet 3    meloxicam (MOBIC) 15 MG tablet Take 1 tablet by mouth daily 90 tablet 3    temazepam (RESTORIL) 30 MG capsule TAKE ONE CAPSULE BY MOUTH AT BEDTIME 90 capsule 0    omeprazole (PRILOSEC) 40 MG delayed release capsule Take 1 capsule by mouth daily 90 capsule 3  topiramate (TOPAMAX) 50 MG tablet Take 1 tablet by mouth 2 times daily 180 tablet 3    citalopram (CELEXA) 20 MG tablet TAKE ONE TABLET BY MOUTH ONCE DAILY 90 tablet 3    oxybutynin (DITROPAN XL) 10 MG extended release tablet Take 1 tablet by mouth daily 90 tablet 3    busPIRone (BUSPAR) 10 MG tablet Take 1 tablet by mouth 3 times daily 270 tablet 3    levothyroxine (SYNTHROID) 88 MCG tablet TAKE ONE TABLET BY MOUTH ONCE DAILY 90 tablet 3    ezetimibe (ZETIA) 10 MG tablet TAKE 1 TABLET DAILY 90 tablet 3    traMADol (ULTRAM) 50 MG tablet Take 50 mg by mouth every 6 hours as needed for Pain.  tiZANidine (ZANAFLEX) 4 MG tablet Take 1 tablet by mouth every 8 hours as needed (back pain) 30 tablet 0    fluticasone (FLONASE) 50 MCG/ACT nasal spray USE 2 SPRAYS IN EACH NOSTRIL DAILY 48 g 5    Blood Pressure Monitoring (BLOOD PRESS MONITOR/M-L CUFF) MISC 1 kit by Does not apply route daily. 1 each 0    gabapentin (NEURONTIN) 300 MG capsule Take 1 capsule by mouth 4 times daily for 84 days. 360 capsule 3     No current facility-administered medications for this visit.       Allergies   Allergen Reactions    Pcn [Penicillins]        Health Maintenance   Topic Date Due    AAA screen  1950    DTaP/Tdap/Td vaccine (1 - Tdap) 06/29/1969    Shingles Vaccine (2 of 3) 12/22/2017    A1C test (Diabetic or Prediabetic)  02/13/2018    Annual Wellness Visit (AWV)  07/22/2021 (Originally 5/29/2019)    Lipid screen  01/27/2021    TSH testing  01/27/2021    Statin Therapy  09/21/2021    Colon cancer screen colonoscopy  06/04/2024    Flu vaccine  Completed    Pneumococcal 65+ years Vaccine  Completed    Hepatitis C screen  Completed    Hepatitis A vaccine  Aged Out    Hepatitis B vaccine  Aged Out    Hib vaccine  Aged Out    Meningococcal (ACWY) vaccine  Aged Out       Subjective:      Review of Systems   Constitutional: Negative for activity change, appetite change, chills, fatigue, fever and unexpected weight change. HENT: Negative for congestion, ear pain, hearing loss, sinus pressure, sore throat and trouble swallowing. Eyes: Negative for visual disturbance. Respiratory: Negative for cough, shortness of breath and wheezing. Cardiovascular: Positive for chest pain (Pressure upon awakening). Negative for palpitations and leg swelling. Gastrointestinal: Positive for heartburn. Negative for abdominal pain, blood in stool, constipation, diarrhea, nausea and vomiting. Heartburn   Endocrine: Negative for cold intolerance, heat intolerance, polydipsia, polyphagia and polyuria. Genitourinary: Negative for difficulty urinating, frequency, hematuria and urgency. Musculoskeletal: Negative for arthralgias and myalgias. Skin: Negative for rash. Allergic/Immunologic: Negative for environmental allergies. Neurological: Negative for dizziness, weakness, light-headedness and headaches. Psychiatric/Behavioral: Negative for confusion. The patient is not nervous/anxious. Objective:     Physical Exam  Constitutional:       Appearance: He is well-developed. HENT:      Head: Normocephalic. Eyes:      Conjunctiva/sclera: Conjunctivae normal.      Pupils: Pupils are equal, round, and reactive to light. Neck:      Musculoskeletal: Normal range of motion. Cardiovascular:      Rate and Rhythm: Normal rate and regular rhythm. Heart sounds: Normal heart sounds. No murmur. Pulmonary:      Effort: Pulmonary effort is normal.      Breath sounds: Normal breath sounds. No wheezing. Abdominal:      General: Bowel sounds are normal. There is no distension. Palpations: Abdomen is soft. Musculoskeletal: Normal range of motion. Skin:     General: Skin is warm and dry. Neurological:      Mental Status: He is alert and oriented to person, place, and time. Psychiatric:         Behavior: Behavior normal.         Thought Content:  Thought content normal.         Judgment: Judgment normal.       /76   Wt 218 lb (98.9 kg)   BMI 29.57 kg/m²     Assessment:       Diagnosis Orders   1. Acquired hypothyroidism     2. Anxiety     3. Chronic midline low back pain without sciatica     4. Mixed hyperlipidemia     5. Primary insomnia     6. Gastroesophageal reflux disease, esophagitis presence not specified  famotidine (PEPCID) 40 MG tablet   7. At high risk for falls     8. Bilateral hearing loss, unspecified hearing loss type               Plan:      Return in about 4 months (around 1/28/2021) for insomnia, arthritis. Hypothyroidism-past due for TSH recheck, will obtain today. Continue current dose levothyroxine for now  Anxiety, insomnia-remains well controlled on current medications  Back pain- stable on meloxicam and as needed use of Tylenol  GERD-seems to be worsening particularly upon awakening in the mornings, add famotidine at at bedtime, identify and avoid trigger foods and beverages. Advised to avoid eating or drinking 2 hours before bedtime. Encouraged to sleep with head elevated on 2 or more pillows. Discussed if symptoms do not improve will refer to GI for EGD  Hearing loss- offered referral to audiology for evaluation and possible hearing aids but he declines at this time    Orders Placed This Encounter   Medications    famotidine (PEPCID) 40 MG tablet     Sig: Take 1 tablet by mouth every evening     Dispense:  90 tablet     Refill:  3       Patient given educational materials - see patient instructions. Discussed use, benefit, and side effects of prescribed medications. All patientquestions answered. Pt voiced understanding. Reviewed health maintenance. Instructedto continue current medications, diet and exercise. Patient agreed with treatmentplan. Follow up as directed.      Electronicallysigned by LINUS Simms CNP on 9/28/2020 at 2:26 PM  On the basis of positive falls risk screening, assessment and plan is as follows: home safety tips provided, patient declines any further evaluation/treatment for increased falls risk. within normal limits

## 2020-10-01 ENCOUNTER — TELEPHONE (OUTPATIENT)
Dept: PRIMARY CARE CLINIC | Age: 70
End: 2020-10-01

## 2020-10-01 NOTE — TELEPHONE ENCOUNTER
Kirstin Tidwell is calling stating that she received a letter stating that [facility] would not be seeing patients any more at this location - she was told to disregard the letter that Althea Lopez should be sending out a letter cleaning up the confusion - she insisted that she needs to talk to [facility], she would like [facility] to call her when she gets time so she can discuss her not leaving the practice

## 2020-10-05 RX ORDER — GABAPENTIN 300 MG/1
CAPSULE ORAL
Qty: 360 CAPSULE | Refills: 3 | Status: SHIPPED | OUTPATIENT
Start: 2020-10-05 | End: 2021-04-12

## 2020-10-05 NOTE — TELEPHONE ENCOUNTER
Last OV 09/28/2020    Next OV 01/28/2021    Health Maintenance   Topic Date Due    AAA screen  1950    DTaP/Tdap/Td vaccine (1 - Tdap) 06/29/1969    Shingles Vaccine (2 of 3) 12/22/2017    A1C test (Diabetic or Prediabetic)  02/13/2018    Annual Wellness Visit (AWV)  07/22/2021 (Originally 5/29/2019)    Lipid screen  01/27/2021    Statin Therapy  09/21/2021    TSH testing  09/28/2021    Colon cancer screen colonoscopy  06/04/2024    Flu vaccine  Completed    Pneumococcal 65+ years Vaccine  Completed    Hepatitis C screen  Completed    Hepatitis A vaccine  Aged Out    Hepatitis B vaccine  Aged Out    Hib vaccine  Aged Out    Meningococcal (ACWY) vaccine  Aged Out             (applicable per patient's age: Cancer Screenings, Depression Screening, Fall Risk Screening, Immunizations)    Hemoglobin A1C (%)   Date Value   02/13/2017 5.9   11/10/2016 6.3   07/07/2016 5.9     LDL Cholesterol (mg/dL)   Date Value   01/27/2020 48     LDL Calculated (mg/dL)   Date Value   06/03/2016 49     AST (U/L)   Date Value   01/27/2020 15     ALT (U/L)   Date Value   01/27/2020 18     BUN (mg/dL)   Date Value   01/27/2020 10      (goal A1C is < 7)   (goal LDL is <100) need 30-50% reduction from baseline     BP Readings from Last 3 Encounters:   09/28/20 124/76   12/26/19 108/62   11/22/19 120/88    (goal /80)      All Future Testing planned in [facility]:  Lab Frequency Next Occurrence       Next Visit Date:  Future Appointments   Date Time Provider Iggy Leger   1/28/2021  7:20 AM Yulia Hudson APRN - CNP [facility]            Patient Active Problem List:     Hyperlipidemia     Anxiety     Insomnia     Osteoarthritis     Sleep apnea     Chronic midline low back pain without sciatica     Gastroesophageal reflux disease     Therapeutic opioid induced constipation     Acquired hypothyroidism     Chronic tension-type headache, not intractable     Hx-TIA (transient ischemic attack) Fibromyalgia     Orthostatic hypotension     Diastolic dysfunction

## 2020-10-06 RX ORDER — TEMAZEPAM 30 MG/1
CAPSULE ORAL
Qty: 10 CAPSULE | Refills: 0 | Status: SHIPPED | OUTPATIENT
Start: 2020-10-06 | End: 2020-11-27 | Stop reason: SDUPTHER

## 2020-10-06 RX ORDER — TEMAZEPAM 30 MG/1
CAPSULE ORAL
Qty: 90 CAPSULE | Refills: 0 | Status: SHIPPED | OUTPATIENT
Start: 2020-10-06 | End: 2020-12-23 | Stop reason: SDUPTHER

## 2020-10-06 NOTE — TELEPHONE ENCOUNTER
Pt needs temporary 10 tabs to go to [place] waiting fo [facility] to fill RX.     LOV 9/28/20  NOV 1/28/21    Health Maintenance   Topic Date Due    AAA screen  1950    DTaP/Tdap/Td vaccine (1 - Tdap) 06/29/1969    Shingles Vaccine (2 of 3) 12/22/2017    A1C test (Diabetic or Prediabetic)  02/13/2018    Annual Wellness Visit (AWV)  07/22/2021 (Originally 5/29/2019)    Lipid screen  01/27/2021    Statin Therapy  09/21/2021    TSH testing  09/28/2021    Colon cancer screen colonoscopy  06/04/2024    Flu vaccine  Completed    Pneumococcal 65+ years Vaccine  Completed    Hepatitis C screen  Completed    Hepatitis A vaccine  Aged Out    Hepatitis B vaccine  Aged Out    Hib vaccine  Aged Out    Meningococcal (ACWY) vaccine  Aged Out             (applicable per patient's age: Cancer Screenings, Depression Screening, Fall Risk Screening, Immunizations)    Hemoglobin A1C (%)   Date Value   02/13/2017 5.9   11/10/2016 6.3   07/07/2016 5.9     LDL Cholesterol (mg/dL)   Date Value   01/27/2020 48     LDL Calculated (mg/dL)   Date Value   06/03/2016 49     AST (U/L)   Date Value   01/27/2020 15     ALT (U/L)   Date Value   01/27/2020 18     BUN (mg/dL)   Date Value   01/27/2020 10      (goal A1C is < 7)   (goal LDL is <100) need 30-50% reduction from baseline     BP Readings from Last 3 Encounters:   09/28/20 124/76   12/26/19 108/62   11/22/19 120/88    (goal /80)      All Future Testing planned in [facility]:  Lab Frequency Next Occurrence       Next Visit Date:  Future Appointments   Date Time Provider Iggy Leger   1/28/2021  7:20 AM LINUS Gordon - CNP [facility]            Patient Active Problem List:     Hyperlipidemia     Anxiety     Insomnia     Osteoarthritis     Sleep apnea     Chronic midline low back pain without sciatica     Gastroesophageal reflux disease     Therapeutic opioid induced constipation     Acquired hypothyroidism     Chronic tension-type headache, not intractable     Hx-TIA (transient ischemic attack)     Fibromyalgia     Orthostatic hypotension     Diastolic dysfunction

## 2020-10-06 NOTE — TELEPHONE ENCOUNTER
LOV 9/28/20  NOV 1/28/21    Health Maintenance   Topic Date Due    AAA screen  1950    DTaP/Tdap/Td vaccine (1 - Tdap) 06/29/1969    Shingles Vaccine (2 of 3) 12/22/2017    A1C test (Diabetic or Prediabetic)  02/13/2018    Annual Wellness Visit (AWV)  07/22/2021 (Originally 5/29/2019)    Lipid screen  01/27/2021    Statin Therapy  09/21/2021    TSH testing  09/28/2021    Colon cancer screen colonoscopy  06/04/2024    Flu vaccine  Completed    Pneumococcal 65+ years Vaccine  Completed    Hepatitis C screen  Completed    Hepatitis A vaccine  Aged Out    Hepatitis B vaccine  Aged Out    Hib vaccine  Aged Out    Meningococcal (ACWY) vaccine  Aged Out             (applicable per patient's age: Cancer Screenings, Depression Screening, Fall Risk Screening, Immunizations)    Hemoglobin A1C (%)   Date Value   02/13/2017 5.9   11/10/2016 6.3   07/07/2016 5.9     LDL Cholesterol (mg/dL)   Date Value   01/27/2020 48     LDL Calculated (mg/dL)   Date Value   06/03/2016 49     AST (U/L)   Date Value   01/27/2020 15     ALT (U/L)   Date Value   01/27/2020 18     BUN (mg/dL)   Date Value   01/27/2020 10      (goal A1C is < 7)   (goal LDL is <100) need 30-50% reduction from baseline     BP Readings from Last 3 Encounters:   09/28/20 124/76   12/26/19 108/62   11/22/19 120/88    (goal /80)      All Future Testing planned in [facility]:  Lab Frequency Next Occurrence       Next Visit Date:  Future Appointments   Date Time Provider Iggy Leger   1/28/2021  7:20 AM Yulia East APRN - CNP [facility]            Patient Active Problem List:     Hyperlipidemia     Anxiety     Insomnia     Osteoarthritis     Sleep apnea     Chronic midline low back pain without sciatica     Gastroesophageal reflux disease     Therapeutic opioid induced constipation     Acquired hypothyroidism     Chronic tension-type headache, not intractable     Hx-TIA (transient ischemic attack)     Fibromyalgia     Orthostatic hypotension     Diastolic dysfunction

## 2020-11-19 ENCOUNTER — VIRTUAL VISIT (OUTPATIENT)
Dept: PRIMARY CARE CLINIC | Age: 70
End: 2020-11-19
Payer: MEDICARE

## 2020-11-19 PROCEDURE — 99423 OL DIG E/M SVC 21+ MIN: CPT | Performed by: NURSE PRACTITIONER

## 2020-11-19 RX ORDER — DOXYCYCLINE HYCLATE 100 MG
100 TABLET ORAL 2 TIMES DAILY
Qty: 20 TABLET | Refills: 0 | Status: SHIPPED | OUTPATIENT
Start: 2020-11-19 | End: 2020-11-29

## 2020-11-19 NOTE — PROGRESS NOTES
Rigoberto Trujillo is a 79 y.o. male evaluated via telephone on 11/19/2020. Consent:  He and/or health care decision maker is aware that that he may receive a bill for this telephone service, depending on his insurance coverage, and has provided verbal consent to proceed: Yes      Documentation:  Spoke with patient and wife about increased fatigue, sinus congestion, loss of taste over the past 2 months. Has also had increased difficulty sleeping. Has been taking care of wife who had recent knee surgery and feels he is doing a lot around the home. Already taking 30 mg restoril. Has been drinking a lot lately and urinating more often, worrying about diabetes  Will treat for suspected sinus infection with doxycycline, check labs to rule out developing diabetes or other underlying condition    I affirm this is a Patient Initiated Episode with a Patient who has not had a related appointment within my department in the past 7 days or scheduled within the next 24 hours.     Patient identification was verified at the start of the visit: Yes    Total Time: minutes: 21-30 minutes    Note: not billable if this call serves to triage the patient into an appointment for the relevant concern      Neal Rodriguez

## 2020-11-20 ENCOUNTER — HOSPITAL ENCOUNTER (OUTPATIENT)
Age: 70
Setting detail: SPECIMEN
Discharge: HOME | End: 2020-11-20
Payer: MEDICARE

## 2020-11-20 LAB
ABSOLUTE EOS #: 0.15 K/UL (ref 0–0.44)
ABSOLUTE IMMATURE GRANULOCYTE: <0.03 K/UL (ref 0–0.3)
ABSOLUTE LYMPH #: 1.74 K/UL (ref 1.1–3.7)
ABSOLUTE MONO #: 0.36 K/UL (ref 0.1–1.2)
ALBUMIN SERPL-MCNC: 4 G/DL (ref 3.5–5.2)
ALBUMIN/GLOBULIN RATIO: 1.7 (ref 1–2.5)
ALP BLD-CCNC: 57 U/L (ref 40–129)
ALT SERPL-CCNC: 19 U/L (ref 5–41)
ANION GAP SERPL CALCULATED.3IONS-SCNC: 11 MMOL/L (ref 9–17)
AST SERPL-CCNC: 16 U/L
BASOPHILS # BLD: 1 % (ref 0–2)
BASOPHILS ABSOLUTE: 0.05 K/UL (ref 0–0.2)
BILIRUB SERPL-MCNC: 0.79 MG/DL (ref 0.3–1.2)
BUN BLDV-MCNC: 22 MG/DL (ref 8–23)
BUN/CREAT BLD: ABNORMAL (ref 9–20)
CALCIUM SERPL-MCNC: 8.6 MG/DL (ref 8.6–10.4)
CHLORIDE BLD-SCNC: 106 MMOL/L (ref 98–107)
CHOLESTEROL/HDL RATIO: 3
CHOLESTEROL: 104 MG/DL
CO2: 22 MMOL/L (ref 20–31)
CREAT SERPL-MCNC: 1.28 MG/DL (ref 0.7–1.2)
DIFFERENTIAL TYPE: NORMAL
EOSINOPHILS RELATIVE PERCENT: 3 % (ref 1–4)
GFR AFRICAN AMERICAN: >60 ML/MIN
GFR NON-AFRICAN AMERICAN: 56 ML/MIN
GFR SERPL CREATININE-BSD FRML MDRD: ABNORMAL ML/MIN/{1.73_M2}
GFR SERPL CREATININE-BSD FRML MDRD: ABNORMAL ML/MIN/{1.73_M2}
GLUCOSE BLD-MCNC: 129 MG/DL (ref 70–99)
HCT VFR BLD CALC: 48.1 % (ref 40.7–50.3)
HDLC SERPL-MCNC: 35 MG/DL
HEMOGLOBIN: 15.6 G/DL (ref 13–17)
IMMATURE GRANULOCYTES: 0 %
LDL CHOLESTEROL: 33 MG/DL (ref 0–130)
LYMPHOCYTES # BLD: 32 % (ref 24–43)
MCH RBC QN AUTO: 30.6 PG (ref 25.2–33.5)
MCHC RBC AUTO-ENTMCNC: 32.4 G/DL (ref 28.4–34.8)
MCV RBC AUTO: 94.3 FL (ref 82.6–102.9)
MONOCYTES # BLD: 7 % (ref 3–12)
NRBC AUTOMATED: 0 PER 100 WBC
PDW BLD-RTO: 12.7 % (ref 11.8–14.4)
PLATELET # BLD: 142 K/UL (ref 138–453)
PLATELET ESTIMATE: NORMAL
PMV BLD AUTO: 11.9 FL (ref 8.1–13.5)
POTASSIUM SERPL-SCNC: 4.5 MMOL/L (ref 3.7–5.3)
RBC # BLD: 5.1 M/UL (ref 4.21–5.77)
RBC # BLD: NORMAL 10*6/UL
SEG NEUTROPHILS: 57 % (ref 36–65)
SEGMENTED NEUTROPHILS ABSOLUTE COUNT: 3.08 K/UL (ref 1.5–8.1)
SODIUM BLD-SCNC: 139 MMOL/L (ref 135–144)
TOTAL PROTEIN: 6.3 G/DL (ref 6.4–8.3)
TRIGL SERPL-MCNC: 179 MG/DL
VLDLC SERPL CALC-MCNC: ABNORMAL MG/DL (ref 1–30)
WBC # BLD: 5.4 K/UL (ref 3.5–11.3)
WBC # BLD: NORMAL 10*3/UL

## 2020-11-22 LAB
ESTIMATED AVERAGE GLUCOSE: 146 MG/DL
HBA1C MFR BLD: 6.7 % (ref 4–6)

## 2020-11-23 RX ORDER — TRAZODONE HYDROCHLORIDE 100 MG/1
100 TABLET ORAL NIGHTLY
COMMUNITY
Start: 2020-10-01 | End: 2021-12-02

## 2020-11-24 ENCOUNTER — TELEPHONE (OUTPATIENT)
Dept: PHARMACY | Facility: CLINIC | Age: 70
End: 2020-11-24

## 2020-11-24 ENCOUNTER — CARE COORDINATION (OUTPATIENT)
Dept: CARE COORDINATION | Age: 70
End: 2020-11-24

## 2020-11-24 SDOH — ECONOMIC STABILITY: FOOD INSECURITY: WITHIN THE PAST 12 MONTHS, THE FOOD YOU BOUGHT JUST DIDN'T LAST AND YOU DIDN'T HAVE MONEY TO GET MORE.: OFTEN TRUE

## 2020-11-24 SDOH — HEALTH STABILITY: PHYSICAL HEALTH: ON AVERAGE, HOW MANY MINUTES DO YOU ENGAGE IN EXERCISE AT THIS LEVEL?: 0 MIN

## 2020-11-24 SDOH — SOCIAL STABILITY: SOCIAL NETWORK: HOW OFTEN DO YOU ATTENT MEETINGS OF THE CLUB OR ORGANIZATION YOU BELONG TO?: NEVER

## 2020-11-24 SDOH — SOCIAL STABILITY: SOCIAL NETWORK: ARE YOU MARRIED, WIDOWED, DIVORCED, SEPARATED, NEVER MARRIED, OR LIVING WITH A PARTNER?: MARRIED

## 2020-11-24 SDOH — SOCIAL STABILITY: SOCIAL NETWORK: HOW OFTEN DO YOU GET TOGETHER WITH FRIENDS OR RELATIVES?: MORE THAN THREE TIMES A WEEK

## 2020-11-24 SDOH — ECONOMIC STABILITY: TRANSPORTATION INSECURITY
IN THE PAST 12 MONTHS, HAS LACK OF TRANSPORTATION KEPT YOU FROM MEETINGS, WORK, OR FROM GETTING THINGS NEEDED FOR DAILY LIVING?: NO

## 2020-11-24 SDOH — ECONOMIC STABILITY: INCOME INSECURITY: HOW HARD IS IT FOR YOU TO PAY FOR THE VERY BASICS LIKE FOOD, HOUSING, MEDICAL CARE, AND HEATING?: SOMEWHAT HARD

## 2020-11-24 SDOH — HEALTH STABILITY: MENTAL HEALTH: HOW MANY STANDARD DRINKS CONTAINING ALCOHOL DO YOU HAVE ON A TYPICAL DAY?: NOT ASKED

## 2020-11-24 SDOH — HEALTH STABILITY: PHYSICAL HEALTH: ON AVERAGE, HOW MANY DAYS PER WEEK DO YOU ENGAGE IN MODERATE TO STRENUOUS EXERCISE (LIKE A BRISK WALK)?: 0 DAYS

## 2020-11-24 SDOH — SOCIAL STABILITY: SOCIAL NETWORK
IN A TYPICAL WEEK, HOW MANY TIMES DO YOU TALK ON THE PHONE WITH FAMILY, FRIENDS, OR NEIGHBORS?: MORE THAN THREE TIMES A WEEK

## 2020-11-24 SDOH — SOCIAL STABILITY: SOCIAL NETWORK
DO YOU BELONG TO ANY CLUBS OR ORGANIZATIONS SUCH AS CHURCH GROUPS UNIONS, FRATERNAL OR ATHLETIC GROUPS, OR SCHOOL GROUPS?: NO

## 2020-11-24 SDOH — SOCIAL STABILITY: SOCIAL NETWORK: HOW OFTEN DO YOU ATTEND CHURCH OR RELIGIOUS SERVICES?: NEVER

## 2020-11-24 SDOH — ECONOMIC STABILITY: FOOD INSECURITY: WITHIN THE PAST 12 MONTHS, YOU WORRIED THAT YOUR FOOD WOULD RUN OUT BEFORE YOU GOT MONEY TO BUY MORE.: OFTEN TRUE

## 2020-11-24 SDOH — ECONOMIC STABILITY: TRANSPORTATION INSECURITY
IN THE PAST 12 MONTHS, HAS THE LACK OF TRANSPORTATION KEPT YOU FROM MEDICAL APPOINTMENTS OR FROM GETTING MEDICATIONS?: NO

## 2020-11-24 SDOH — HEALTH STABILITY: MENTAL HEALTH
STRESS IS WHEN SOMEONE FEELS TENSE, NERVOUS, ANXIOUS, OR CAN'T SLEEP AT NIGHT BECAUSE THEIR MIND IS TROUBLED. HOW STRESSED ARE YOU?: TO SOME EXTENT

## 2020-11-24 SDOH — HEALTH STABILITY: MENTAL HEALTH: HOW OFTEN DO YOU HAVE A DRINK CONTAINING ALCOHOL?: NEVER

## 2020-11-24 NOTE — TELEPHONE ENCOUNTER
Received a referral:  from Care Coordinator for med education, adherence and med reconciliation review patients medications. Called patient to schedule a time to speak with a pharmacist over the telephone. No answer left VM: Please contact us at [phone] Option #7 to schedule this appointment. Pharmacists are available Monday thru Friday 7:30 AM till 5:30 PM.    Ines Shah [facility].   Clinical Pharmacy   Toll free: [phone], option 7

## 2020-11-24 NOTE — CARE COORDINATION
Ambulatory Care Coordination Note  11/24/2020  CM Risk Score: 1  Charlson 10 Year Mortality Risk Score: 23%     ACC: Kyle Blanchard, RN    Summary Note: Spoke to wife, reports patient is resting and hard of hearing   Explained ACM role and care coordination, and agreeable  Patient was just diagnosed with DM and oral meds prescribed  A1C; 6.7   She has questions about diet and types of food he should eat/ and avoid  He does not follow any specific diet. Drinks sugary drinks; soda pop/coffee/tea, and eats desserts   Diabetic diet education reviewed. Education material will be mailed   Agreeable to talking to dietician. Reports, they have meal delivery service through the [facility]. They usually eat 1 hot meal and breakfast  Sometimes has financial difficulties and cant afford groceries    Reports, able to cover mortgage / rent, and utilities   Agreeable for  referral to explore community resources     Patient fell last week, denies injury. 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 She had ankle surgery last fall   Patient is independent with ADLs, and drives     Patient has meds on hand and taking as prescribed  Reports, medication copays are less then $10 each   SDOH reviewed   Encouraged contacting providers as needed with changes/questions/concerns   CM plan; follow for care management. Sent referrals for ABDULKADIR, RD and pharmacist   Next call; continue review DM education     Diabetes Assessment    Medic Alert ID:  No  Meal Planning:  Calorie counting   How often do you test your blood sugar?:  No Testing   Do you have barriers with adherence to non-pharmacologic self-management interventions?  (Nutrition/Exercise/Self-Monitoring):  Yes   Have you ever had to go to the ED for symptoms of low blood sugar?:  No       Do you have hyperglycemia symptoms?:  No   Do you have hypoglycemia symptoms?:  No   Blood Sugar Monitoring Regimen: Take 1 tablet by mouth every evening 9/28/20 Deyanne Ables, APRN - CNP   simvastatin (ZOCOR) 80 MG tablet TAKE 1 TABLET DAILY AT BEDTIME 9/21/20 Deyanne Ables, APRN - CNP   midodrine (PROAMATINE) 2.5 MG tablet TAKE 1 TABLET THREE TIMES A DAY 9/21/20   Dilcia Vásquez MD   meloxicmauro [facility]) 15 MG tablet Take 1 tablet by mouth daily 7/21/20 Deyanne Ables, APRN - CNP   omeprazole (PRILOSEC) 40 MG delayed release capsule Take 1 capsule by mouth daily 6/3/20   Deyanne Dian, APRN - CNP   topiramate (TOPAMAX) 50 MG tablet Take 1 tablet by mouth 2 times daily 5/26/20 Deyanne Ables, APRN - CNP   citalopram (CELEXA) 20 MG tablet TAKE ONE TABLET BY MOUTH ONCE DAILY 5/26/20 Deyanne Ables, APRN - CNP   oxybutynin (DITROPAN XL) 10 MG extended release tablet Take 1 tablet by mouth daily 4/27/20 Deyanne Ables, APRN - CNP   busPIRone (BUSPAR) 10 MG tablet Take 1 tablet by mouth 3 times daily 4/27/20 Deyanne Ables, APRN - CNP   levothyroxine (SYNTHROID) 88 MCG tablet TAKE ONE TABLET BY MOUTH ONCE DAILY 1/27/20 Deyanne Ables, APRN - CNP   ezetimibe (ZETIA) 10 MG tablet TAKE 1 TABLET DAILY 12/26/19 Deyanne Ables, APRN - CNP   traMADol (ULTRAM) 50 MG tablet Take 50 mg by mouth every 6 hours as needed for Pain. Historical Provider, MD   tiZANidine (ZANAFLEX) 4 MG tablet Take 1 tablet by mouth every 8 hours as needed (back pain) 9/10/19   Dedanane Ables, APRN - CNP   fluticasone (FLONASE) 50 MCG/ACT nasal spray USE 2 SPRAYS IN EACH NOSTRIL DAILY 7/2/19 Deyanne Ables, APRN - CNP   Blood Pressure Monitoring (BLOOD PRESS MONITOR/M-L CUFF) MISC 1 kit by Does not apply route daily.  4/30/14   Amira Arnold, DO       Future Appointments   Date Time Provider Iggy Leger   2/22/2021  1:00 PM Tarsha Biggs APRN - CNP [facility]

## 2020-11-24 NOTE — TELEPHONE ENCOUNTER
----- Message from Alex Boyle RN sent at 11/24/2020  2:46 PM EST -----  Regarding: new dx DM  Hello    Patient was enrolled with care coordination. He was just diagnosed DM and will be starting Metformin.  Will you please reach out to him and review; med education, adherence and med reconciliation     Thank you   Alex Boyle RN

## 2020-11-25 ENCOUNTER — CARE COORDINATION (OUTPATIENT)
Dept: CARE COORDINATION | Age: 70
End: 2020-11-25

## 2020-11-25 NOTE — CARE COORDINATION
Registered Dietitian Initial Assessment for Care Coordination      Name-Royal MONZON [facility]  November 25, 2020    Initial Referral Reason: Diabetes    Patient Care Team:  LINUS Correia CNP as PCP - General (Family Nurse Practitioner)  LINUS Correia CNP as PCP - [facility]  Katya Wilson RN as Ambulatory Care Manager  Noam Infante RD, LD as Dietitian    Patient Active Problem List   Diagnosis    Hyperlipidemia    Anxiety    Insomnia    Osteoarthritis    Sleep apnea    Chronic midline low back pain without sciatica    Gastroesophageal reflux disease    Therapeutic opioid induced constipation    Acquired hypothyroidism    Chronic tension-type headache, not intractable    Hx-TIA (transient ischemic attack)    Fibromyalgia    Orthostatic hypotension    Diastolic dysfunction       Current Outpatient Medications   Medication Sig Dispense Refill    traZODone (DESYREL) 100 MG tablet Take 100 mg by mouth nightly      metFORMIN (GLUCOPHAGE) 500 MG tablet Take 1 tablet by mouth 2 times daily (with meals) 60 tablet 3    doxycycline hyclate (VIBRA-TABS) 100 MG tablet Take 1 tablet by mouth 2 times daily for 10 days 20 tablet 0    temazepam (RESTORIL) 30 MG capsule TAKE ONE CAPSULE BY MOUTH AT BEDTIME 90 capsule 0    temazepam (RESTORIL) 30 MG capsule TAKE ONE CAPSULE BY MOUTH AT BEDTIME 10 capsule 0    gabapentin (NEURONTIN) 300 MG capsule TAKE 1 CAPSULE FOUR TIMES A  capsule 3    famotidine (PEPCID) 40 MG tablet Take 1 tablet by mouth every evening 90 tablet 3    simvastatin (ZOCOR) 80 MG tablet TAKE 1 TABLET DAILY AT BEDTIME 90 tablet 3    midodrine (PROAMATINE) 2.5 MG tablet TAKE 1 TABLET THREE TIMES A  tablet 3    meloxicam (MOBIC) 15 MG tablet Take 1 tablet by mouth daily 90 tablet 3    omeprazole (PRILOSEC) 40 MG delayed release capsule Take 1 capsule by mouth daily 90 capsule 3    topiramate (TOPAMAX) 50 MG tablet Take 1 tablet by mouth 2 times daily 180 tablet 3    citalopram (CELEXA) 20 MG tablet TAKE ONE TABLET BY MOUTH ONCE DAILY 90 tablet 3    oxybutynin (DITROPAN XL) 10 MG extended release tablet Take 1 tablet by mouth daily 90 tablet 3    busPIRone (BUSPAR) 10 MG tablet Take 1 tablet by mouth 3 times daily 270 tablet 3    levothyroxine (SYNTHROID) 88 MCG tablet TAKE ONE TABLET BY MOUTH ONCE DAILY 90 tablet 3    ezetimibe (ZETIA) 10 MG tablet TAKE 1 TABLET DAILY 90 tablet 3    traMADol (ULTRAM) 50 MG tablet Take 50 mg by mouth every 6 hours as needed for Pain.  tiZANidine (ZANAFLEX) 4 MG tablet Take 1 tablet by mouth every 8 hours as needed (back pain) 30 tablet 0    fluticasone (FLONASE) 50 MCG/ACT nasal spray USE 2 SPRAYS IN EACH NOSTRIL DAILY 48 g 5    Blood Pressure Monitoring (BLOOD PRESS MONITOR/M-L CUFF) MISC 1 kit by Does not apply route daily. 1 each 0     No current facility-administered medications for this visit.           Visit for:  Obesity/Weight loss  Diabetes: X  Hypertension:  Hyperlipidemia:  Other:     Anthropometric Measurements:  HT: 6'1\"  Weight: 218  IBW: 184 + or - 10%  BMI: 29    Biochemical Data, Medical Tests and Procedures:    Lab Results   Component Value Date    LABA1C 6.7 (H) 11/20/2020     Lab Results   Component Value Date     11/20/2020       Lab Results   Component Value Date    CHOL 104 11/20/2020    CHOL 122 01/27/2020    CHOL 113 11/07/2018     Lab Results   Component Value Date    TRIG 179 (H) 11/20/2020    TRIG 147 01/27/2020    TRIG 165 (H) 11/07/2018     Lab Results   Component Value Date    HDL 35 (L) 11/20/2020    HDL 45 01/27/2020    HDL 38 (L) 11/07/2018     Lab Results   Component Value Date    LDLCALC 49 06/03/2016    LDLCALC 54 08/06/2014    LDLCHOLESTEROL 33 11/20/2020    LDLCHOLESTEROL 48 01/27/2020    LDLCHOLESTEROL 42 11/07/2018     Lab Results   Component Value Date    VLDL NOT REPORTED (H) 11/20/2020    VLDL NOT REPORTED 01/27/2020    VLDL NOT REPORTED 11/07/2018     Lab reducing pop intake and drinking water. He also loves his sweets- discussed sugar free alternatives to sweets- sugar free jello, pudding, ect. Encouraged patient to keep sweets out of the house as much as possible and limit to once a week or less. Nutrition Intervention Need:  Initial/Brief:  Comprehensive Nutrition Education: X  Coordination of other care during nutrition care:    Monitoring and Evaluation:  Ability to plan meals/snacks: X  Ability to select healthy foods/meals: X  Food and Nutrition Knowledge: X  Self Monitoring:  Physical Activity:  Energy intake:  Fluid/beverage intake:  Fat/chol intake:  Carb intake: X  Other:    Plan:  1. Will continue to educate patient on reading food labels, measuring out portions, carb counting, low carb snacking, plate method, importance of meal pattern, quick/easy diabetic  meal ideas, and limiting/cutting out sweets and sugary drinks. Will mail patient nutrition information on all the above discussed. 2. Will follow up in 2-3 weeks to review nutrition information and answer questions.     Santana Chowdary LD

## 2020-11-25 NOTE — CARE COORDINATION
Nutrition Care Coordinator Follow-Up visit:    Food Recall: Activity Level:  Sedentary:  Lightly Active: Moderately Active:  Very Active:    Adult BMI:  Underweight (below 18.5)  Normal Weight (18.5-24.9)  Overweight (25-29. 9)  Obese (30-39. 9)  Morbidly Obese (>40)    Weight Change:    Plan:  Plan was established with patient:  Increase dietary fiber by consuming whole grains, fruits and vegetables:  Limit dietary cholesterol to >200mg/day: Increase water intake:  Avoid added sugar:  Avoid sweetened beverages:  Choose lean meats:      Monitoring: Will monitor weight:  Will monitor adherence to meal plan: Will monitor adherence to exercise plan: Will monitor HGA1c:    Handouts Provided :  Low Carb snacking:  Carb counting /individual meal plan:  Portion Control:  Food Labels:  Physical Activity:  Low Fat/Cholesterol:  Hypo/Hyperglycemia:  Calorie Controlled Meal Plan:    Goals: Increase water consumption to 8oz. 6-8 times daily:  Manage blood sugars by consuming 3 meals spaced every 4-5 hours with 2-3 snacks daily:  Increase fiber and decrease fat intake by consuming 1-2 fruit servings and 2-3 vegetable servings per day.   Increase physical activity by:  Consume less than 2,000mg of sodium/day  Avoid consumption of sweetened beverages and added sugar by reading food labels:  Monitor blood sugars by using meter to check blood glucose before morning meal and 2 hours after a meal daily:  Decrease risk of coronary heart disease by consuming fish that contains omega-3 fatty acids at least twice a week, avoiding partially hydrogenated oil/trans fats and limiting saturated fat intake by reading food labels:    Patient goals set:  1.  2.       Nicholas Melo

## 2020-11-27 ENCOUNTER — SCHEDULED TELEPHONE ENCOUNTER (OUTPATIENT)
Dept: PHARMACY | Facility: CLINIC | Age: 70
End: 2020-11-27

## 2020-11-27 NOTE — TELEPHONE ENCOUNTER
CLINICAL PHARMACY NOTE - Medication Review  Patient outreach to review medications - Spoke with patient. SUBJECTIVE/OBJECTIVE:   Alaina Reyes is a 79 y.o. male referred to a clinical pharmacy specialist by care coordinator for medication review and education. Medications:  Medication Sig Comments    fluticasone (FLONASE) 50 MCG/ACT nasal spray 1 spray by Each Nostril route daily as needed for Rhinitis or Allergies Uses seasonally     MISC NATURAL PRODUCTS PO Take 1 tablet by mouth daily as needed (gas) (Herbal Bean and Vegetable Gas Relief)     traZODone (DESYREL) 100 MG tablet Take 100 mg by mouth nightly PTSD, night terrors - uses multiple medications to sleep at night     temazepam (RESTORIL) 30 MG capsule TAKE ONE CAPSULE BY MOUTH AT BEDTIME     gabapentin (NEURONTIN) 300 MG capsule TAKE 1 CAPSULE FOUR TIMES A DAY For neuropathy, arthritis pain    famotidine (PEPCID) 40 MG tablet Take 1 tablet by mouth every evening For GERD    simvastatin (ZOCOR) 80 MG tablet TAKE 1 TABLET DAILY AT BEDTIME For cholesterol     midodrine (PROAMATINE) 2.5 MG tablet TAKE 1 TABLET THREE TIMES A DAY For low BP    meloxicam (MOBIC) 15 MG tablet Take 1 tablet by mouth daily For arthritis     omeprazole (PRILOSEC) 40 MG delayed release capsule Take 1 capsule by mouth daily For GERD    topiramate (TOPAMAX) 50 MG tablet Take 1 tablet by mouth 2 times daily     citalopram (CELEXA) 20 MG tablet TAKE ONE TABLET BY MOUTH ONCE DAILY For depression/anxiety     oxybutynin (DITROPAN XL) 10 MG extended release tablet Take 1 tablet by mouth daily For bladder     busPIRone (BUSPAR) 10 MG tablet Take 1 tablet by mouth 3 times daily For anxiety    levothyroxine (SYNTHROID) 88 MCG tablet TAKE ONE TABLET BY MOUTH ONCE DAILY For thyroid     ezetimibe (ZETIA) 10 MG tablet TAKE 1 TABLET DAILY For cholesterol     traMADol (ULTRAM) 50 MG tablet Take 50 mg by mouth every 6 hours as needed for Pain (migraine).       metFORMIN (GLUCOPHAGE) 500 MG tablet Take 1 tablet by mouth 2 times daily (with meals) For diabetes     doxycyline hyclate (VIBRA-TABS) 100 MG tablet Take 1 tablet by mouth 2 times daily for 10 days     Blood Pressure Monitoring (BLOOD PRESS MONITOR/M-L CUFF) MISC 1 kit by Does not apply route daily. Allergies: Allergen Reactions    Pcn [Penicillins]      Pertinent Labs/Vitals:  BP Readings from Last 3 Encounters:   20 124/76   19 108/62   19 120/88     Hemoglobin A1c   Component Value Date    LABA1C 6.7 (H) 2020    LABA1C 5.9 2017    LABA1C 6.3 11/10/2016     Lipids   Component Value Date    CHOL 104 2020    TRIG 179 (H) 2020    HDL 35 (L) 2020    LDLCHOLESTEROL 33 2020     ALT   Date Value Ref Range Status   2020 19 5 - 41 U/L Final     AST   Date Value Ref Range Status   2020 16 <40 U/L Final     The ASCVD Risk score (Mona Aggarwal, et al., 2013) failed to calculate for the following reasons: The valid total cholesterol range is 130 to 320 mg/dL      Ref.  Range 2020 11:57 2020 13:40   TSH Latest Ref Range: 0.30 - 5.00 mIU/L 14.66 (H) 0.44     Renal Function   Component Value Date    CREATININE 1.28 (H) 2020   CrCL ~66 mL/min (calculated using sCr 1.28 mg/dL, Ht 1.829 m, Adj.BW 86.1 kg, using C-G equation)   eGFR: 56 mL/min/1.73 m^2    Social History:   Tobacco Use    Smoking status: Former Smoker     Packs/day: 3.00     Years: 20.00     Pack years: 60.00     Last attempt to quit:      Years since quittin.9    Smokeless tobacco: Never Used   Substance Use Topics    Alcohol use: Not Currently     Frequency: Never     Comment: occ     Immunizations:   Administered Date(s) Administered    Influenza Virus Vaccine 10/19/2018    Influenza, High Dose (Fluzone 65 yrs and older) 2020    Influenza, High-dose, Quadv, 65 yrs +, IM (Fluzone) 2020    Influenza, Quadv, IM, (6 mo and older Fluzone, Flulaval, Fluarix and 3 yrs and older Afluria) 10/27/2017    Pneumococcal Conjugate 13-valent (Lychzap99) 10/29/2015    Pneumococcal Conjugate 7-valent (Prevnar7) 10/01/2009    Pneumococcal Polysaccharide ([id number]) 11/07/2018    Zoster Live (Zostavax) 10/27/2017     ASSESSMENT/PLAN:   - General Assessment:   · Adherence: states no issues  · Cost: no concerns mentioned  · Current pharmacy/pharmacies: [facility], [facility]  · Drug interactions: The following clinically significant interactions were identified via [employer] Interaction Analysis as category D or higher: tramadol, trazodone, temazepam, and gabapentin; citalopram and omeprazole. · Tramadol, trazodone, temazepam, and gabapentin all have CNS depression effects. Patient reports he has been on for a long time and only takes trazodone and temazepam to help him sleep due to night terrors. Tramadol use is for migraines and very infrequent. · Omeprazole may increase concentrations of citalopram and could lead to side effects such as serotonin syndrome and QT prolongation. Patient currently taking maximum recommended dose with this DDI, which is 20mg daily. Recommend continued monitoring. · Renal dosing: No renal adjustments necessary. · Medication monitoring: pain, thyroid levels, renal function, BP/HR, BG/A1c, GERD, PTSD/sleep, electrolytes, SEs  · Immunizations: flu and pna UTD  · Smoking status: Former smoker   · Blood pressure: Is at BP target - runs low, on midodrine  · Lipids: Patient is prescribed moderate- to high-intensity statin therapy. - Diabetes:    Glycemic goal: <7.0%.  Is at blood glucose goal. Newly diagnosed - patient worried about having to use insulin shots, explained that current A1c in goal and everyone's disease progresses differently, discussed there are multiple oral options available before insulin but sometimes insulin is best option; patient felt much better after discussion of pathophysiology, treatment goals, and medications  Current symptoms/problems: blurry vision - states he has bad snacks at bedtime while watching TV, unsure if due to high blood sugar or straining eyes on TV; education provided on signs of hyper- and hypo-glycemia   Home blood sugar records: not currently testing - explained that not needed given current A1c and medications but could help him learn more about how different things affect BG, plans to discuss with PCP   Any episodes of hypoglycemia: unknown - states he has felt like he has symptoms but unsure if low BG, explained how to treat and discussed using a glucometer if he gets one   Therapy optimization: on day 2 of metformin 500mg BID - advised patient to take on food (patient stated he had nausea when he took on empty stomach and felt better when took with food) and suggested taking once daily for a week before titration up to BID to reduce risks of other GI SEs   Diet/exercise: no lifestyle modifications yet - looked online regarding food/carbs, care coordinator is supposed to be mailing him information/handouts   Barriers to success: currently lack of knowledge, recommend attending DM education classes    - Upcoming appointments:   Date Time Provider Department Center   2021  1:00 PM LINUS Melendez - [facility] Jackie Bailey, PharmD, [facility]  Ambulatory Clinical Care Pharmacist   [address]  [phone], Option 7    =======================================================    For Pharmacy Admin Tracking Only  PHSO: Yes  Total # of Interventions Recommended: 1  - Updated Order #: 1 Updated Order Reason(s): OTC,  Medication, Other  Recommended intervention potential cost savings: 1  Total Interventions Accepted: 1  Time Spent (min): 120

## 2020-11-30 ENCOUNTER — CARE COORDINATION (OUTPATIENT)
Dept: CARE COORDINATION | Age: 70
End: 2020-11-30

## 2020-11-30 ENCOUNTER — VIRTUAL VISIT (OUTPATIENT)
Dept: PRIMARY CARE CLINIC | Age: 70
End: 2020-11-30
Payer: MEDICARE

## 2020-11-30 ENCOUNTER — TELEPHONE (OUTPATIENT)
Dept: PRIMARY CARE CLINIC | Age: 70
End: 2020-11-30

## 2020-11-30 PROCEDURE — 99423 OL DIG E/M SVC 21+ MIN: CPT | Performed by: NURSE PRACTITIONER

## 2020-11-30 RX ORDER — FLUTICASONE PROPIONATE 50 MCG
1 SPRAY, SUSPENSION (ML) NASAL DAILY PRN
COMMUNITY

## 2020-11-30 NOTE — CARE COORDINATION
Domenico Baez is a 66-year-old male who is a patient of Osei Shields. Don Lee sent social service a referral to assist with food insecurities.  attempted to contact Mr and Ms. Myahmichael Elizabeth however no answer.  will attempt to follow up with Mr. And Ms. Myah  later this week.

## 2020-11-30 NOTE — PROGRESS NOTES
Vikram Stafford is a 79 y.o. male evaluated via telephone on 11/30/2020. Consent:  He and/or health care decision maker is aware that that he may receive a bill for this telephone service, depending on his insurance coverage, and has provided verbal consent to proceed: Yes      Documentation:  I communicated with the patient new diagnosis of diabetes. Details of this discussion including any medical advice provided: Experiencing adverse effects with stomach upset and diarrhea from Metformin. Will stop Metformin and start with dietary modification. Lengthy discussion on appropriate diabetic diet, will also send printed information via mail to patient. .  Advised to call if GI symptoms do not improve within 3 to 4 days of stopping the Metformin    I affirm this is a Patient Initiated Episode with a Patient who has not had a related appointment within my department in the past 7 days or scheduled within the next 24 hours.     Patient identification was verified at the start of the visit: Yes    Total Time: minutes: 21-30 minutes    Note: not billable if this call serves to triage the patient into an appointment for the relevant concern      Lis Strange

## 2020-12-02 ENCOUNTER — CARE COORDINATION (OUTPATIENT)
Dept: CARE COORDINATION | Age: 70
End: 2020-12-02

## 2020-12-03 ENCOUNTER — CARE COORDINATION (OUTPATIENT)
Dept: CARE COORDINATION | Age: 70
End: 2020-12-03

## 2020-12-03 NOTE — CARE COORDINATION
called and spoke to Fernanda briefly. Fernanda reports that she was sleeping and requested a call back later. 12/3     called and spoke to Fernanda. Sophy reports that things are \"ok\" at home. Fernanda reports that they were receiving senior meals from mobile meals. Fernanda reports that the [address] meals are much cheaper so she switch. Vy reports [employer] is not trying to back bill her since she no longer receives meals. Fernanda reports they have to receive these meals because this is the only thing they have to eat. Fernanda reports when they do have extra money, they do have some groceries delivered. Fernanda reports with their current bills they have no money for food.  inquired about income. Fernanda reports they earn more than $2,000 a month with  for both, [employer], and [place] cash assistance. Fernanda reports they own their trailer and pay lot rent ($350) plus utilities. Fernanda reports they are making payments on medical bills which make it hard financially. Fernanda was agreeable for  to [place]. Fernanda reports that she will not go inside but if they do drive thru, she was agreeable.  called Misty Chopra to obtain food and toiletry pantries.  called Vy back with 2 food pantries and 1 toiletry pantry. Vy requested information mailed to her.  inquired about support for them such as family and friends. Fernanda reports that her children live in [place] and work and have families. Vy requested assistance with a script for a new or different inhaler. Fernanda reports that her dog chewed up the tip of her inhaler. Plan:    will Inform Rosa of inhaler issue.  will mail list of food pantries and toiletries.

## 2020-12-03 NOTE — CARE COORDINATION
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 Reports he usually snacks during the day vs eating regular meals   Hes working with dietician/ Tyler Raza  Has meal delivery services for 1 daily meal   Working with / Jana Molina for [employer] for groceries and food insecurities   Reports, has meds on hand and taking as prescribed  CM plan; follow for care coordination. Review ongoing DM education. Assess for falls         Care Coordination Interventions    Program Enrollment:  Complex Care  Referral from Primary Care Provider:  No  Suggested Interventions and Community Resources  Diabetes Education:  Declined  Fall Risk Prevention: In Process  Meals on Wheels:  Completed  Registered Dietician: In Process  Social Work:  In Process  Zone Management Tools:   (Comment: DM)         Goals Addressed                 This Visit's Progress     Conditions and Symptoms   Improving     I will schedule office visits, as directed by my provider. I will notify my provider of any barriers to my plan of care. I will follow my Zone Management tool to seek urgent or emergent care. I will notify my provider of any symptoms that indicate a worsening of my condition. Barriers: newly Dx DM, overwhelmed by complexity of regimen and lack of education  Plan for overcoming my barriers: education and care coordination   Confidence: 7/10  Anticipated Goal Completion Date: 2/24/2021              Prior to Admission medications    Medication Sig Start Date End Date Taking?  Authorizing Provider   fluticasone (FLONASE) 50 MCG/ACT nasal spray 1 spray by Each Nostril route daily as needed for Rhinitis or Allergies    Historical Provider, MD   MISC NATURAL PRODUCTS PO Take 1 tablet by mouth daily as needed (gas) (Herbal Bean and Vegetable Gas Relief)    Historical Provider, MD   traZODone (DESYREL) 100 MG tablet Take 100 mg by mouth nightly 10/1/20   Historical Provider, MD   temazepam (RESTORIL) 30 MG capsule TAKE ONE CAPSULE BY MOUTH AT BEDTIME 10/6/20 2/4/21  Emily Stabs, APRN - CNP   gabapentin (NEURONTIN) 300 MG capsule TAKE 1 CAPSULE FOUR TIMES A DAY 10/5/20 10/5/21  Emily Stabs, APRN - CNP   famotidine (PEPCID) 40 MG tablet Take 1 tablet by mouth every evening 9/28/20   Emily Stabs, LINUS - CNP   simvastatin (ZOCOR) 80 MG tablet TAKE 1 TABLET DAILY AT BEDTIME 9/21/20   Emily Stabs, LINUS - CNP   midodrine (PROAMATINE) 2.5 MG tablet TAKE 1 TABLET THREE TIMES A DAY 9/21/20   Olga Magaña MD   meloxicam (MOBIC) 15 MG tablet Take 1 tablet by mouth daily 7/21/20   Emily Stabs, APRN - CNP   omeprazole (PRILOSEC) 40 MG delayed release capsule Take 1 capsule by mouth daily 6/3/20   Emily Stabs, APRN - CNP   topiramate (TOPAMAX) 50 MG tablet Take 1 tablet by mouth 2 times daily 5/26/20   Emily Stabs, APRN - CNP   citalopram (CELEXA) 20 MG tablet TAKE ONE TABLET BY MOUTH ONCE DAILY 5/26/20   Emily Stabs, APRN - CNP   oxybutynin (DITROPAN XL) 10 MG extended release tablet Take 1 tablet by mouth daily 4/27/20   Emily Stabs, APRN - CNP   busPIRone (BUSPAR) 10 MG tablet Take 1 tablet by mouth 3 times daily 4/27/20   Emily Stabs, APRN - CNP   levothyroxine (SYNTHROID) 88 MCG tablet TAKE ONE TABLET BY MOUTH ONCE DAILY 1/27/20   Emily Stabs, APRN - CNP   ezetimibe (ZETIA) 10 MG tablet TAKE 1 TABLET DAILY 12/26/19   Emily Stabs, APRN - CNP   traMADol (ULTRAM) 50 MG tablet Take 50 mg by mouth every 6 hours as needed for Pain (migraine).      Historical Provider, MD   Blood Pressure Monitoring (BLOOD PRESS MONITOR/M-L CUFF) MISC 1 kit by Does not apply route daily.  4/30/14   Cherelle Garza, DO       Future Appointments   Date Time Provider Iggy Leger   2/22/2021  1:00 PM LINUS Oneill - CNP [facility] [address] [facility]

## 2020-12-09 ENCOUNTER — CARE COORDINATION (OUTPATIENT)
Dept: CARE COORDINATION | Age: 70
End: 2020-12-09

## 2020-12-10 ENCOUNTER — CARE COORDINATION (OUTPATIENT)
Dept: CARE COORDINATION | Age: 70
End: 2020-12-10

## 2020-12-10 NOTE — CARE COORDINATION
Ambulatory Care Coordination Note  12/10/2020  CM Risk Score: 1  Charlson 10 Year Mortality Risk Score: 23%     ACC: Katya Wilson RN    Summary Note: tires easily. 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 ongoing DM education    Diabetes Assessment    Medic Alert ID:  No  Meal Planning:  Calorie counting   How often do you test your blood sugar?:  No Testing   Do you have barriers with adherence to non-pharmacologic self-management interventions? (Nutrition/Exercise/Self-Monitoring):  Yes   Have you ever had to go to the ED for symptoms of low blood sugar?:  No       No patient-reported symptoms       and   General Assessment    Do you have any symptoms that are causing concern?:  No           Care Coordination Interventions    Program Enrollment:  Complex Care  Referral from Primary Care Provider:  No  Suggested Interventions and Community Resources  Diabetes Education:  Declined  Fall Risk Prevention: In Process  Meals on Wheels:  Completed  Registered Dietician:  Completed  Social Work:  Completed  Zone Management Tools:   (Comment: DM)         Goals Addressed                 This Visit's Progress     Conditions and Symptoms   No change     I will schedule office visits, as directed by my provider. I will notify my provider of any barriers to my plan of care. I will follow my Zone Management tool to seek urgent or emergent care.   I will notify my provider of any symptoms that indicate a worsening of my condition. Barriers: newly Dx DM, overwhelmed by complexity of regimen and lack of education  Plan for overcoming my barriers: education and care coordination   Confidence: 7/10  Anticipated Goal Completion Date: 2/24/2021              Prior to Admission medications    Medication Sig Start Date End Date Taking?  Authorizing Provider   fluticasone (FLONASE) 50 MCG/ACT nasal spray 1 spray by Each Nostril route daily as needed for Rhinitis or Allergies    Historical Provider, MD   MISC NATURAL PRODUCTS PO Take 1 tablet by mouth daily as needed (gas) (Herbal Bean and Vegetable Gas Relief)    Historical Provider, MD   traZODone (DESYREL) 100 MG tablet Take 100 mg by mouth nightly 10/1/20   Historical Provider, MD   temazepam (RESTORIL) 30 MG capsule TAKE ONE CAPSULE BY MOUTH AT BEDTIME 10/6/20 2/4/21  [facility], APRN - CNP   gabapentin (NEURONTIN) 300 MG capsule TAKE 1 CAPSULE FOUR TIMES A DAY 10/5/20 10/5/21  [facility], APRN - CNP   famotidine (PEPCID) 40 MG tablet Take 1 tablet by mouth every evening 9/28/20   [facility], APRN - CNP   simvastatin (ZOCOR) 80 MG tablet TAKE 1 TABLET DAILY AT BEDTIME 9/21/20   [facility], APRN - CNP   midodrine (PROAMATINE) 2.5 MG tablet TAKE 1 TABLET THREE TIMES A DAY 9/21/20   Ilan Kemp MD   meloxicam (MOBIC) 15 MG tablet Take 1 tablet by mouth daily 7/21/20   [facility], APRN - CNP   omeprazole (PRILOSEC) 40 MG delayed release capsule Take 1 capsule by mouth daily 6/3/20   [facility], APRN - CNP   topiramate (TOPAMAX) 50 MG tablet Take 1 tablet by mouth 2 times daily 5/26/20   [facility], APRN - CNP   citalopram (CELEXA) 20 MG tablet TAKE ONE TABLET BY MOUTH ONCE DAILY 5/26/20   [facility], APRN - CNP   oxybutynin (DITROPAN XL) 10 MG extended release tablet Take 1 tablet by mouth daily 4/27/20   [facility], APRN - CNP   busPIRone (BUSPAR) 10 MG tablet Take 1 tablet by mouth 3 times daily 4/27/20   Myles Perez Jesu Fernandez - CNP   levothyroxine (SYNTHROID) 88 MCG tablet TAKE ONE TABLET BY MOUTH ONCE DAILY 1/27/20   LINUS Vickers CNP   ezetimibe (ZETIA) 10 MG tablet TAKE 1 TABLET DAILY 12/26/19   LINUS Vickers - CNP   traMADol (ULTRAM) 50 MG tablet Take 50 mg by mouth every 6 hours as needed for Pain (migraine). Historical Provider, MD   Blood Pressure Monitoring (BLOOD PRESS MONITOR/M-L CUFF) MISC 1 kit by Does not apply route daily.  4/30/14   Nader Camejo, DO       Future Appointments   Date Time Provider Iggy Leger   3/11/2021  3:00 PM LINUS Barton - CNP [facility] [address] [facility]

## 2020-12-14 ENCOUNTER — CARE COORDINATION (OUTPATIENT)
Dept: CARE COORDINATION | Age: 70
End: 2020-12-14

## 2020-12-14 NOTE — CARE COORDINATION
Nutrition Care Coordinator Follow-Up visit:    Food Recall: eating 2-3 meals/d    Activity Level:  Sedentary: X  Lightly Active: Moderately Active:  Very Active:    Adult BMI:  Underweight (below 18.5)  Normal Weight (18.5-24.9)  Overweight (25-29. 9) X  Obese (30-39. 9)  Morbidly Obese (>40)    Weight Change: no change    Plan:  Plan was established with patient:  Increase dietary fiber by consuming whole grains, fruits and vegetables: X  Limit dietary cholesterol to >200mg/day: Increase water intake:  Avoid added sugar: X  Avoid sweetened beverages: X  Choose lean meats: X      Monitoring: Will monitor weight:  Will monitor adherence to meal plan: Will monitor adherence to exercise plan: Will monitor HGA1c: X    Handouts Provided :  Low Carb snacking: X  Carb counting /individual meal plan: X  Portion Control: X  Food Labels: X  Physical Activity:  Low Fat/Cholesterol:  Hypo/Hyperglycemia:  Calorie Controlled Meal Plan:    Goals: Increase water consumption to 8oz. 6-8 times daily:  Manage blood sugars by consuming 3 meals spaced every 4-5 hours with 2-3 snacks daily: discussed  Increase fiber and decrease fat intake by consuming 1-2 fruit servings and 2-3 vegetable servings per day. Increase physical activity by:  Consume less than 2,000mg of sodium/day  Avoid consumption of sweetened beverages and added sugar by reading food labels: discussed  Monitor blood sugars by using meter to check blood glucose before morning meal and 2 hours after a meal daily:   Decrease risk of coronary heart disease by consuming fish that contains omega-3 fatty acids at least twice a week, avoiding partially hydrogenated oil/trans fats and limiting saturated fat intake by reading food labels:    Patient goals set:  1.  Reviewed having set meal times patient's wife relays they are doing better with this, now eating 2-3 meals/day instead of just 1 meal/day.  Quick/easy breakfast/dinner suggestions reviewed.  Goal is 3 meals daily spaced every 4-5 hours. 2. Reviewed what foods contain carbohydrate to limit and how to measure out portions. Encouraged patient to limit carb intake to 60gms/meal, 15-30gms/snack.  Reviewed low carb snacking. 3. Reviewed plate method, encouraged patient to increase intake of non-starchy vegetables.  Goal is 1/2 of  plate to be non-starchy vegetables, 1/4 lean protein, 1/4 carbs. Foods from each category reviewed along with what a serving size is   4. Reviewed avoiding/limiting sweets and sweetened drinks. Reviewed sugar-free sweeteners in place of regular sugar for coffee and working on reducing pop intake and drinking water. Reviewed sugar free alternatives to sweets- sugar free jello, pudding, ect. Patient's wife relays doing much better with this- stopped drinking pop and adding sugar to his coffee. He has also reduced the amount of sweets he is eating. Spoke with patient's wife, she relays they got nutrition information and are following recommendations- sates he  is doing well has cut out all sugary drinks and avoiding sweets now. No questions at this time. Will follow up in 3-4 weeks to review and answer questions.      Modesto Cartlon

## 2020-12-15 ENCOUNTER — CARE COORDINATION (OUTPATIENT)
Dept: CARE COORDINATION | Age: 70
End: 2020-12-15

## 2020-12-15 NOTE — CARE COORDINATION
Ambulatory Care Coordination Note  12/15/2020  CM Risk Score: 1  Charlson 10 Year Mortality Risk Score: 23%     ACC: Hailee Donahue, RN    Summary Note: attempted outreach to review care management needs. Unable to reach, no answer and unable to leave a message           Care Coordination Interventions    Program Enrollment: Complex Care  Referral from Primary Care Provider: No  Suggested Interventions and Community Resources  Diabetes Education: Declined  Fall Risk Prevention: In Process  Meals on Wheels: Completed  Registered Dietician: Completed  Social Work: Completed  Zone Management Tools:  (Comment: DM)         Goals Addressed    None         Prior to Admission medications    Medication Sig Start Date End Date Taking?  Authorizing Provider   fluticasone (FLONASE) 50 MCG/ACT nasal spray 1 spray by Each Nostril route daily as needed for Rhinitis or Allergies    Historical Provider, MD   MISC NATURAL PRODUCTS PO Take 1 tablet by mouth daily as needed (gas) (Herbal Bean and Vegetable Gas Relief)    Historical Provider, MD   traZODone (DESYREL) 100 MG tablet Take 100 mg by mouth nightly 10/1/20   Historical Provider, MD   temazepam (RESTORIL) 30 MG capsule TAKE ONE CAPSULE BY MOUTH AT BEDTIME 10/6/20 2/4/21  LINUS Quiles CNP   gabapentin (NEURONTIN) 300 MG capsule TAKE 1 CAPSULE FOUR TIMES A DAY 10/5/20 10/5/21  LINUS Quiles CNP   famotidine (PEPCID) 40 MG tablet Take 1 tablet by mouth every evening 9/28/20   LINUS Quiles CNP   simvastatin (ZOCOR) 80 MG tablet TAKE 1 TABLET DAILY AT BEDTIME 9/21/20   LINUS Quiles CNP   midodrine (PROAMATINE) 2.5 MG tablet TAKE 1 TABLET THREE TIMES A DAY 9/21/20   Wiley Tafoya MD   meloxicam (MOBIC) 15 MG tablet Take 1 tablet by mouth daily 7/21/20   LINUS Quiles CNP   omeprazole (PRILOSEC) 40 MG delayed release capsule Take 1 capsule by mouth daily 6/3/20   LINUS Quiles CNP   topiramate (TOPAMAX) 50 MG tablet Take 1 tablet by mouth 2 times daily 5/26/20   LINUS Estrada CNP   citalopram (CELEXA) 20 MG tablet TAKE ONE TABLET BY MOUTH ONCE DAILY 5/26/20   Travon Staley, LINUS - CNP   oxybutynin (DITROPAN XL) 10 MG extended release tablet Take 1 tablet by mouth daily 4/27/20   LINUS Estrada - CNP   busPIRone (BUSPAR) 10 MG tablet Take 1 tablet by mouth 3 times daily 4/27/20   LINUS Estrada - CNP   levothyroxine (SYNTHROID) 88 MCG tablet TAKE ONE TABLET BY MOUTH ONCE DAILY 1/27/20   Travon Staley, LINUS - CNP   ezetimibe (ZETIA) 10 MG tablet TAKE 1 TABLET DAILY 12/26/19   LINUS Estrada - CNP   traMADol (ULTRAM) 50 MG tablet Take 50 mg by mouth every 6 hours as needed for Pain (migraine). Historical Provider, MD   Blood Pressure Monitoring (BLOOD PRESS MONITOR/M-L CUFF) MISC 1 kit by Does not apply route daily.  4/30/14   Huong Rooney,        Future Appointments   Date Time Provider Iggy Leger   3/11/2021  3:00 PM LINUS Cronin CNP [facility] [address] [facility]

## 2020-12-16 ENCOUNTER — CARE COORDINATION (OUTPATIENT)
Dept: CARE COORDINATION | Age: 70
End: 2020-12-16

## 2020-12-16 NOTE — CARE COORDINATION
Attempted outreach to review care management needs.  Unable to reach, left VM with return contact information

## 2020-12-17 NOTE — CARE COORDINATION
called and spoke to Jim Lopez. Jim Lopez reports she was sleeping however can talk. Jim Lopez confirmed received the food pantry list along with other resources that were sent to them. Jim Lopez shared that she was in the hospital recently and has not been able to make it to food pantries. Jim Lopez reports that Janessa Elizondo is taking good care of her. Vy requested  call back another day and wanted to go back to sleep.  was agreeable. Plan:    will call and discuss possible PASSPORT service and other resources [facility] could offer.

## 2020-12-23 ENCOUNTER — CARE COORDINATION (OUTPATIENT)
Dept: CARE COORDINATION | Age: 70
End: 2020-12-23

## 2020-12-23 DIAGNOSIS — G47.00 INSOMNIA, UNSPECIFIED TYPE: ICD-10-CM

## 2020-12-23 RX ORDER — TEMAZEPAM 30 MG/1
CAPSULE ORAL
Qty: 90 CAPSULE | Refills: 0 | Status: SHIPPED | OUTPATIENT
Start: 2020-12-23 | End: 2020-12-31 | Stop reason: SDUPTHER

## 2020-12-23 NOTE — CARE COORDINATION
complexity of regimen and lack of education  Plan for overcoming my barriers: education and care coordination   Confidence: 7/10  Anticipated Goal Completion Date: 2/24/2021              Prior to Admission medications    Medication Sig Start Date End Date Taking?  Authorizing Provider   fluticasone (FLONASE) 50 MCG/ACT nasal spray 1 spray by Each Nostril route daily as needed for Rhinitis or Allergies    Historical Provider, MD   MISC NATURAL PRODUCTS PO Take 1 tablet by mouth daily as needed (gas) (Herbal Bean and Vegetable Gas Relief)    Historical Provider, MD   traZODone (DESYREL) 100 MG tablet Take 100 mg by mouth nightly 10/1/20   Historical Provider, MD   temazepam (RESTORIL) 30 MG capsule TAKE ONE CAPSULE BY MOUTH AT BEDTIME 10/6/20 2/4/21  LINUS Ramos CNP   gabapentin (NEURONTIN) 300 MG capsule TAKE 1 CAPSULE FOUR TIMES A DAY 10/5/20 10/5/21  LINUS Ramos CNP   famotidine (PEPCID) 40 MG tablet Take 1 tablet by mouth every evening 9/28/20   LINUS Ramos CNP   simvastatin (ZOCOR) 80 MG tablet TAKE 1 TABLET DAILY AT BEDTIME 9/21/20   LINUS Ramos CNP   midodrine (PROAMATINE) 2.5 MG tablet TAKE 1 TABLET THREE TIMES A DAY 9/21/20   Abraham Polk MD   meloxicam (MOBIC) 15 MG tablet Take 1 tablet by mouth daily 7/21/20   LINUS Ramos CNP   omeprazole (PRILOSEC) 40 MG delayed release capsule Take 1 capsule by mouth daily 6/3/20   LINUS Ramos CNP   topiramate (TOPAMAX) 50 MG tablet Take 1 tablet by mouth 2 times daily 5/26/20   LINUS Ramos CNP   citalopram (CELEXA) 20 MG tablet TAKE ONE TABLET BY MOUTH ONCE DAILY 5/26/20   LINUS Ramos CNP   oxybutynin (DITROPAN XL) 10 MG extended release tablet Take 1 tablet by mouth daily 4/27/20   LINUS Ramos CNP   busPIRone (BUSPAR) 10 MG tablet Take 1 tablet by mouth 3 times daily 4/27/20   LINUS Ramos CNP   levothyroxine (SYNTHROID) 88 MCG tablet TAKE ONE TABLET BY MOUTH ONCE DAILY 1/27/20   LINUS Meredith CNP   ezetimibe (ZETIA) 10 MG tablet TAKE 1 TABLET DAILY 12/26/19   LINUS Meredith CNP   traMADol (ULTRAM) 50 MG tablet Take 50 mg by mouth every 6 hours as needed for Pain (migraine). Historical Provider, MD   Blood Pressure Monitoring (BLOOD PRESS MONITOR/M-L CUFF) MISC 1 kit by Does not apply route daily.  4/30/14   Jane Yu, DO       Future Appointments   Date Time Provider Iggy Leger   3/11/2021  3:00 PM LINUS Moser CNP [facility]

## 2020-12-29 ENCOUNTER — TELEPHONE (OUTPATIENT)
Dept: PRIMARY CARE CLINIC | Age: 70
End: 2020-12-29

## 2020-12-29 NOTE — TELEPHONE ENCOUNTER
Please apologize, extremely busy afternoon seeing patients in office. Offered to schedule them a phone call visit on Thursday as I will not be in office tomorrow. Okay to double book an appointment on Thursday to accommodate scheduling this visit.   Thanks

## 2020-12-31 ENCOUNTER — VIRTUAL VISIT (OUTPATIENT)
Dept: PRIMARY CARE CLINIC | Age: 70
End: 2020-12-31
Payer: MEDICARE

## 2020-12-31 PROCEDURE — 99423 OL DIG E/M SVC 21+ MIN: CPT | Performed by: NURSE PRACTITIONER

## 2020-12-31 RX ORDER — BLOOD-GLUCOSE METER
KIT MISCELLANEOUS
Qty: 1 KIT | Refills: 0 | Status: SHIPPED | OUTPATIENT
Start: 2020-12-31

## 2020-12-31 RX ORDER — TEMAZEPAM 15 MG/1
CAPSULE ORAL
Qty: 30 CAPSULE | Refills: 0 | Status: SHIPPED | OUTPATIENT
Start: 2020-12-31 | End: 2021-01-25 | Stop reason: SDUPTHER

## 2020-12-31 NOTE — PROGRESS NOTES
Gabrielle Ruelas is a 79 y.o. male evaluated via telephone on 12/31/2020. Consent:  He and/or health care decision maker is aware that that he may receive a bill for this telephone service, depending on his insurance coverage, and has provided verbal consent to proceed: Yes      Documentation:  I communicated with the patient and/or health care decision maker about morning falls upon awakening. Details of this discussion including any medical advice provided: he and his wife report he will get up to feed his dogs and will develop dizziness and fall. Seems to be happening daily over the past week. Denies any injury. He states he will feed his dogs and then will often feel the dizziness and fall. He will eat a snack and then typically will feel better afterward. He is eating a bedtime snack but often is just a piece of fruit. He does not have a glucometer so has been unable to check blood sugar  Also has been on Restoril for sleep for many years. He is on high dose. Has recently had trazodone additionally added. He states he wonders if this may be a factor. He is sleeping well  Will obtain glucometer for patient so that he may check his morning glucose levels. Advised on importance of including protein source and at bedtime snack. We will also decrease dose of Restoril to 15 mg, continue trazodone. Discussed will need further evaluation if things do not improve    I affirm this is a Patient Initiated Episode with a Patient who has not had a related appointment within my department in the past 7 days or scheduled within the next 24 hours.     Patient identification was verified at the start of the visit: Yes    Total Time: minutes: 21-30 minutes    Note: not billable if this call serves to triage the patient into an appointment for the relevant concern      Barry Clark

## 2021-01-04 ENCOUNTER — TELEPHONE (OUTPATIENT)
Dept: PRIMARY CARE CLINIC | Age: 71
End: 2021-01-04

## 2021-01-04 DIAGNOSIS — E11.9 NEW ONSET TYPE 2 DIABETES MELLITUS (HCC): Primary | ICD-10-CM

## 2021-01-04 RX ORDER — LANCETS 30 GAUGE
EACH MISCELLANEOUS
Qty: 100 EACH | Refills: 5 | Status: SHIPPED | OUTPATIENT
Start: 2021-01-04 | End: 2021-11-15

## 2021-01-04 NOTE — TELEPHONE ENCOUNTER
Spoke with pharmacy, they needs new Rx for lancets with the frequency of use on the Rx. Please advise.

## 2021-01-06 ENCOUNTER — CARE COORDINATION (OUTPATIENT)
Dept: CARE COORDINATION | Age: 71
End: 2021-01-06

## 2021-01-06 NOTE — CARE COORDINATION
attempted to contact [employer] regarding Michael [place].  left message on  requesting a call back to [phone]. Plan:    will attempt to contact [place] regarding social needs.

## 2021-01-08 ENCOUNTER — CARE COORDINATION (OUTPATIENT)
Dept: CARE COORDINATION | Age: 71
End: 2021-01-08

## 2021-01-08 NOTE — CARE COORDINATION
Nutrition Care Coordinator Follow-Up visit:    Food Recall: eating 2 meals/d    Activity Level:  Sedentary: X  Lightly Active: Moderately Active:  Very Active:    Adult BMI:  Underweight (below 18.5)  Normal Weight (18.5-24.9)  Overweight (25-29. 9) X  Obese (30-39. 9)  Morbidly Obese (>40)    Weight Change: no change    Plan:  Plan was established with patient:  Increase dietary fiber by consuming whole grains, fruits and vegetables: X  Limit dietary cholesterol to >200mg/day: Increase water intake:  Avoid added sugar: x  Avoid sweetened beverages: x  Choose lean meats: X      Monitoring: Will monitor weight:  Will monitor adherence to meal plan: Will monitor adherence to exercise plan: Will monitor HGA1c: X    Handouts Provided :  Low Carb snacking: X  Carb counting /individual meal plan:  Portion Control: X  Food Labels:  Physical Activity:  Low Fat/Cholesterol:  Hypo/Hyperglycemia:  Calorie Controlled Meal Plan:    Goals: Increase water consumption to 8oz. 6-8 times daily:  Manage blood sugars by consuming 3 meals spaced every 4-5 hours with 2-3 snacks daily: reviewed  Increase fiber and decrease fat intake by consuming 1-2 fruit servings and 2-3 vegetable servings per day. Increase physical activity by:  Consume less than 2,000mg of sodium/day  Avoid consumption of sweetened beverages and added sugar by reading food labels:  Monitor blood sugars by using meter to check blood glucose before morning meal and 2 hours after a meal daily:  Decrease risk of coronary heart disease by consuming fish that contains omega-3 fatty acids at least twice a week, avoiding partially hydrogenated oil/trans fats and limiting saturated fat intake by reading food labels:    Patient goals set:  1. Reviewed having set meal times patient's wife relays they are doing better with this, now eating 2-3 meals/day.  Quick/easy breakfast/dinner suggestions reviewed.  Goal is 3 meals daily spaced every 4-5 hours.    2. Reviewed how to measure out portions using a measuring cup or hand to estimate a serving size. Encouraged patient to limit carb intake to 60gms/meal, 15-30gms/snack.  Reviewed low carb snacking. 3. Reviewed plate method, encouraged patient to increase intake of non-starchy vegetables.  Goal is 1/2 of  plate to be non-starchy vegetables, 1/4 lean protein, 1/4 carbs. Foods from each category reviewed along with what a serving size is   4. Reviewed avoiding/limiting sweets and sweetened drinks. Reviewed sugar free alternatives to sweets- sugar free jello, pudding, ect. Spoke with patient's wife, patient continues to do well, relays holidays were hard but has cut out all sugary drinks and avoiding sweets now. No questions at this time. Will follow up in 3-4 weeks to review and answer questions.        Ketan Ruiz

## 2021-01-11 DIAGNOSIS — N32.81 OAB (OVERACTIVE BLADDER): ICD-10-CM

## 2021-01-11 RX ORDER — OXYBUTYNIN CHLORIDE 10 MG/1
10 TABLET, EXTENDED RELEASE ORAL DAILY
Qty: 90 TABLET | Refills: 3 | Status: SHIPPED | OUTPATIENT
Start: 2021-01-11 | End: 2022-03-02

## 2021-01-11 NOTE — TELEPHONE ENCOUNTER
Last VV 12/31/2020      Health Maintenance   Topic Date Due    AAA screen  1950    Diabetic foot exam  06/29/1960    Diabetic retinal exam  06/29/1960    Diabetic microalbuminuria test  06/29/1968    DTaP/Tdap/Td vaccine (1 - Tdap) 06/29/1969    Shingles Vaccine (2 of 3) 12/22/2017    Annual Wellness Visit (AWV)  07/22/2021 (Originally 5/29/2019)    TSH testing  09/28/2021    A1C test (Diabetic or Prediabetic)  11/20/2021    Lipid screen  11/20/2021    Colon cancer screen colonoscopy  06/04/2024    Flu vaccine  Completed    Pneumococcal 65+ years Vaccine  Completed    Hepatitis C screen  Completed    Hepatitis A vaccine  Aged Out    Hib vaccine  Aged Out    Meningococcal (ACWY) vaccine  Aged Out             (applicable per patient's age: Cancer Screenings, Depression Screening, Fall Risk Screening, Immunizations)    Hemoglobin A1C (%)   Date Value   11/20/2020 6.7 (H)   02/13/2017 5.9   11/10/2016 6.3     LDL Cholesterol (mg/dL)   Date Value   11/20/2020 33     LDL Calculated (mg/dL)   Date Value   06/03/2016 49     AST (U/L)   Date Value   11/20/2020 16     ALT (U/L)   Date Value   11/20/2020 19     BUN (mg/dL)   Date Value   11/20/2020 22      (goal A1C is < 7)   (goal LDL is <100) need 30-50% reduction from baseline     BP Readings from Last 3 Encounters:   09/28/20 124/76   12/26/19 108/62   11/22/19 120/88    (goal /80)      All Future Testing planned in [facility]:  Lab Frequency Next Occurrence       Next Visit Date:  Future Appointments   Date Time Provider Iggy Leger   3/11/2021  3:00 PM Yulia Maria, APRN - CNP [id number]            Patient Active Problem List:     Hyperlipidemia     Anxiety     Insomnia     Osteoarthritis     Sleep apnea     Chronic midline low back pain without sciatica     Gastroesophageal reflux disease     Therapeutic opioid induced constipation     Acquired hypothyroidism     Chronic tension-type headache, not intractable     Hx-TIA (transient ischemic attack)     Fibromyalgia     Orthostatic hypotension     Diastolic dysfunction

## 2021-01-12 ENCOUNTER — CARE COORDINATION (OUTPATIENT)
Dept: CARE COORDINATION | Age: 71
End: 2021-01-12

## 2021-01-13 ENCOUNTER — CARE COORDINATION (OUTPATIENT)
Dept: CARE COORDINATION | Age: 71
End: 2021-01-13

## 2021-01-13 NOTE — CARE COORDINATION
called and spoke to Fernanda. Fernanda reports that her and Alvaro Graham are doing well. Fernanda reports that she has not started therapy yet and is waiting for insurance approval.      Fernanda reports that they are working on [employer] due to his diabetes. Fernanda recently purchased a glucometer to keep track of Royal's sugars. Fernanda reports they are receiving special diabetic meals from the [facility]. Fernanda reports they both get 1 meal a day from them. Vy reports the meals are free and the she makes a monthly donation depending on how much she can afford. Vy denied questions or concerns. Fernanda reports her and Alvaro Graham are going to start working out next weekend in the home. Vy denied questions or concerns or social needs at this time. Plan:    will sign off. Chandra Gunn will continue to work with  [facility] and dietician.

## 2021-01-19 ENCOUNTER — CARE COORDINATION (OUTPATIENT)
Dept: CARE COORDINATION | Age: 71
End: 2021-01-19

## 2021-01-19 NOTE — CARE COORDINATION
Ambulatory Care Coordination Note  1/19/2021  CM Risk Score: 2  Charlson 10 Year Mortality Risk Score: 23%     ACC: Deborah Coleman RN    Summary Note, still having episodes of shakiness, dizziness and falls in the morning    Patient thinks it could be related to his diet. Fewer falls this week since he's eating more at HS   At last PCP visit, Restoril dose was adjusted and advised to check his fasting morning BS   Has been only testing BS before dinner   Reviewed providers instructions to test morning BS, voiced understanding   Taking Restoril as prescribed and eating HS snack with protein  BS averaging 90 - 150   Eats  2-3 times daily. Meal delivery service provides 1 meal daily   Reviewed home safety and fall prevention  Has meds on hand, taking as prescribed  Encouraged contacting providers with changes/ questions/ concerns  CM plan; DM education, review BSs and if now testing mornings? Diabetes Assessment    Medic Alert ID: No  Meal Planning: Calorie counting   How often do you test your blood sugar?: No Testing   Do you have barriers with adherence to non-pharmacologic self-management interventions? (Nutrition/Exercise/Self-Monitoring): Yes   Have you ever had to go to the ED for symptoms of low blood sugar?: No           and   General Assessment    Do you have any symptoms that are causing concern?: No               Care Coordination Interventions    Program Enrollment: Complex Care  Referral from Primary Care Provider: No  Suggested Interventions and Community Resources  Diabetes Education: Declined  Fall Risk Prevention: In Process  Meals on Wheels: Completed  Registered Dietician: Completed  Social Work: Completed  Zone Management Tools:  (Comment: DM)         Goals Addressed    None         Prior to Admission medications    Medication Sig Start Date End Date Taking?  Authorizing Provider   oxybutynin (DITROPAN XL) 10 MG extended release tablet Take 1 tablet by mouth daily 1/11/21   Yulia CAZARES Jolyne Bloch - CNP   Lancets MISC Test glucose 3 times daily and as needed 1/4/21   LINUS Camargo CNP   glucose monitoring kit (FREESTYLE) monitoring kit Supply glucometer, lancets and testing strips best covered by insurance 12/31/20   LINUS Camargo CNP   temazepam (RESTORIL) 15 MG capsule TAKE ONE CAPSULE BY MOUTH AT BEDTIME 12/31/20 5/1/21  LINUS Bucio CNP   fluticasone (FLONASE) 50 MCG/ACT nasal spray 1 spray by Each Nostril route daily as needed for Rhinitis or Allergies    Historical Provider, MD   MISC NATURAL PRODUCTS PO Take 1 tablet by mouth daily as needed (gas) (Herbal Bean and Vegetable Gas Relief)    Historical Provider, MD   traZODone (DESYREL) 100 MG tablet Take 100 mg by mouth nightly 10/1/20   Historical Provider, MD   gabapentin (NEURONTIN) 300 MG capsule TAKE 1 CAPSULE FOUR TIMES A DAY 10/5/20 10/5/21  LINUS Camargo CNP   famotidine (PEPCID) 40 MG tablet Take 1 tablet by mouth every evening 9/28/20   LINUS Camargo CNP   simvastatin (ZOCOR) 80 MG tablet TAKE 1 TABLET DAILY AT BEDTIME 9/21/20   LINUS Camargo CNP   midodrine (PROAMATINE) 2.5 MG tablet TAKE 1 TABLET THREE TIMES A DAY 9/21/20   Kristy Christine MD   meloxicam (MOBIC) 15 MG tablet Take 1 tablet by mouth daily 7/21/20   LINUS Camargo CNP   omeprazole (PRILOSEC) 40 MG delayed release capsule Take 1 capsule by mouth daily 6/3/20   LINUS Camargo CNP   topiramate (TOPAMAX) 50 MG tablet Take 1 tablet by mouth 2 times daily 5/26/20   LINUS Camargo CNP   citalopram (CELEXA) 20 MG tablet TAKE ONE TABLET BY MOUTH ONCE DAILY 5/26/20   LINUS Camargo CNP   busPIRone (BUSPAR) 10 MG tablet Take 1 tablet by mouth 3 times daily 4/27/20   LINUS Camargo CNP   levothyroxine (SYNTHROID) 88 MCG tablet TAKE ONE TABLET BY MOUTH ONCE DAILY 1/27/20   LINUS Camargo CNP   ezetimibe (ZETIA) 10 MG tablet TAKE 1 TABLET DAILY 12/26/19   LINUS Marin - CNP   traMADol (ULTRAM) 50 MG tablet Take 50 mg by mouth every 6 hours as needed for Pain (migraine). Historical Provider, MD   Blood Pressure Monitoring (BLOOD PRESS MONITOR/M-L CUFF) MISC 1 kit by Does not apply route daily.  4/30/14   Anand Parker, DO       Future Appointments   Date Time Provider Iggy Leger   3/11/2021  3:00 PM LINUS Nava CNP [facility]

## 2021-01-21 DIAGNOSIS — E03.9 ACQUIRED HYPOTHYROIDISM: ICD-10-CM

## 2021-01-21 RX ORDER — LEVOTHYROXINE SODIUM 88 UG/1
TABLET ORAL
Qty: 90 TABLET | Refills: 3 | Status: SHIPPED | OUTPATIENT
Start: 2021-01-21 | End: 2021-01-25 | Stop reason: SDUPTHER

## 2021-01-21 NOTE — TELEPHONE ENCOUNTER
Last OV 12/31/20  Health Maintenance   Topic Date Due    AAA screen  1950    Diabetic foot exam  06/29/1960    Diabetic retinal exam  06/29/1960    Diabetic microalbuminuria test  06/29/1968    DTaP/Tdap/Td vaccine (1 - Tdap) 06/29/1969    Shingles Vaccine (2 of 3) 12/22/2017    Annual Wellness Visit (AWV)  07/22/2021 (Originally 5/29/2019)    TSH testing  09/28/2021    A1C test (Diabetic or Prediabetic)  11/20/2021    Lipid screen  11/20/2021    Colon cancer screen colonoscopy  06/04/2024    Flu vaccine  Completed    Pneumococcal 65+ years Vaccine  Completed    Hepatitis C screen  Completed    Hepatitis A vaccine  Aged Out    Hib vaccine  Aged Out    Meningococcal (ACWY) vaccine  Aged Out             (applicable per patient's age: Cancer Screenings, Depression Screening, Fall Risk Screening, Immunizations)    Hemoglobin A1C (%)   Date Value   11/20/2020 6.7 (H)   02/13/2017 5.9   11/10/2016 6.3     LDL Cholesterol (mg/dL)   Date Value   11/20/2020 33     LDL Calculated (mg/dL)   Date Value   06/03/2016 49     AST (U/L)   Date Value   11/20/2020 16     ALT (U/L)   Date Value   11/20/2020 19     BUN (mg/dL)   Date Value   11/20/2020 22      (goal A1C is < 7)   (goal LDL is <100) need 30-50% reduction from baseline     BP Readings from Last 3 Encounters:   09/28/20 124/76   12/26/19 108/62   11/22/19 120/88    (goal /80)      All Future Testing planned in [facility]:  Lab Frequency Next Occurrence       Next Visit Date:  Future Appointments   Date Time Provider Iggy Leger   3/11/2021  3:00 PM Yulia Edward, APRN - CNP [facility]            Patient Active Problem List:     Hyperlipidemia     Anxiety     Insomnia     Osteoarthritis     Sleep apnea     Chronic midline low back pain without sciatica     Gastroesophageal reflux disease     Therapeutic opioid induced constipation     Acquired hypothyroidism     Chronic tension-type headache, not intractable     Hx-TIA (transient ischemic attack)     Fibromyalgia     Orthostatic hypotension     Diastolic dysfunction

## 2021-01-25 ENCOUNTER — TELEPHONE (OUTPATIENT)
Dept: PRIMARY CARE CLINIC | Age: 71
End: 2021-01-25

## 2021-01-25 ENCOUNTER — CARE COORDINATION (OUTPATIENT)
Dept: CARE COORDINATION | Age: 71
End: 2021-01-25

## 2021-01-25 DIAGNOSIS — E03.9 ACQUIRED HYPOTHYROIDISM: ICD-10-CM

## 2021-01-25 DIAGNOSIS — R42 DIZZINESS: ICD-10-CM

## 2021-01-25 DIAGNOSIS — G47.00 INSOMNIA, UNSPECIFIED TYPE: ICD-10-CM

## 2021-01-25 DIAGNOSIS — Z91.81 STATUS POST FALL: Primary | ICD-10-CM

## 2021-01-25 RX ORDER — LEVOTHYROXINE SODIUM 88 UG/1
TABLET ORAL
Qty: 90 TABLET | Refills: 3 | Status: SHIPPED | OUTPATIENT
Start: 2021-01-25 | End: 2022-01-10 | Stop reason: SDUPTHER

## 2021-01-25 RX ORDER — TEMAZEPAM 15 MG/1
CAPSULE ORAL
Qty: 90 CAPSULE | Refills: 1 | Status: SHIPPED | OUTPATIENT
Start: 2021-01-25 | End: 2021-04-12 | Stop reason: SINTOL

## 2021-01-25 NOTE — CARE COORDINATION
Attempted to call for ACM call, he's had falls and PCP ordered CT head. Will attempt to call again tomorrow.

## 2021-01-25 NOTE — TELEPHONE ENCOUNTER
Pts wife called in stating pt had a fall and hit his head not long ago. Pt states new diet pt has been on is not working he is still having dizziness & falls and she is really worried. His last two BS were 120 & 125 but she does not know when those are from. Pt also having memory issues. Wife states that she needs help but doesn't know what kind of help. Please advise. Also routing to [employer] if she wants to reach out to patient.

## 2021-01-25 NOTE — TELEPHONE ENCOUNTER
Last OV 12/31/2020    Next OV 03/11/2021    Health Maintenance   Topic Date Due    AAA screen  1950    Diabetic foot exam  06/29/1960    Diabetic retinal exam  06/29/1960    Diabetic microalbuminuria test  06/29/1968    DTaP/Tdap/Td vaccine (1 - Tdap) 06/29/1969    Shingles Vaccine (2 of 3) 12/22/2017    Annual Wellness Visit (AWV)  07/22/2021 (Originally 5/29/2019)    TSH testing  09/28/2021    A1C test (Diabetic or Prediabetic)  11/20/2021    Lipid screen  11/20/2021    Colon cancer screen colonoscopy  06/04/2024    Flu vaccine  Completed    Pneumococcal 65+ years Vaccine  Completed    Hepatitis C screen  Completed    Hepatitis A vaccine  Aged Out    Hib vaccine  Aged Out    Meningococcal (ACWY) vaccine  Aged Out             (applicable per patient's age: Cancer Screenings, Depression Screening, Fall Risk Screening, Immunizations)    Hemoglobin A1C (%)   Date Value   11/20/2020 6.7 (H)   02/13/2017 5.9   11/10/2016 6.3     LDL Cholesterol (mg/dL)   Date Value   11/20/2020 33     LDL Calculated (mg/dL)   Date Value   06/03/2016 49     AST (U/L)   Date Value   11/20/2020 16     ALT (U/L)   Date Value   11/20/2020 19     BUN (mg/dL)   Date Value   11/20/2020 22      (goal A1C is < 7)   (goal LDL is <100) need 30-50% reduction from baseline     BP Readings from Last 3 Encounters:   09/28/20 124/76   12/26/19 108/62   11/22/19 120/88    (goal /80)      All Future Testing planned in [facility]:  Lab Frequency Next Occurrence   CT HEAD WO CONTRAST Once 01/25/2021       Next Visit Date:  Future Appointments   Date Time Provider Iggy Leger   3/11/2021  3:00 PM Yulia Velasco, APRN - CNP [facility]            Patient Active Problem List:     Hyperlipidemia     Anxiety     Insomnia     Osteoarthritis     Sleep apnea     Chronic midline low back pain without sciatica     Gastroesophageal reflux disease     Therapeutic opioid induced constipation     Acquired hypothyroidism Chronic tension-type headache, not intractable     Hx-TIA (transient ischemic attack)     Fibromyalgia     Orthostatic hypotension     Diastolic dysfunction

## 2021-01-25 NOTE — TELEPHONE ENCOUNTER
Please let them know I would like to obtain CT of the head as this has been an ongoing problem for him. I do not think this is related to any diet changes that they have made with his diabetes.   I would also encourage him to consider coming in for an appointment once they have completed the CT scan as this is very difficult to evaluate over the phone

## 2021-01-26 ENCOUNTER — CARE COORDINATION (OUTPATIENT)
Dept: CARE COORDINATION | Age: 71
End: 2021-01-26

## 2021-01-27 ENCOUNTER — CARE COORDINATION (OUTPATIENT)
Dept: CARE COORDINATION | Age: 71
End: 2021-01-27

## 2021-01-27 SDOH — ECONOMIC STABILITY: INCOME INSECURITY: IN THE LAST 12 MONTHS, WAS THERE A TIME WHEN YOU WERE NOT ABLE TO PAY THE MORTGAGE OR RENT ON TIME?: NO

## 2021-01-27 SDOH — ECONOMIC STABILITY: HOUSING INSECURITY
IN THE LAST 12 MONTHS, WAS THERE A TIME WHEN YOU DID NOT HAVE A STEADY PLACE TO SLEEP OR SLEPT IN A SHELTER (INCLUDING NOW)?: NO

## 2021-01-27 NOTE — CARE COORDINATION
Nutrition Care Coordinator Follow-Up visit:    Food Recall: eating 2-3 meals/d    Activity Level:  Sedentary: X  Lightly Active: Moderately Active:  Very Active:    Adult BMI:  Underweight (below 18.5)  Normal Weight (18.5-24.9)  Overweight (25-29. 9) X  Obese (30-39. 9)  Morbidly Obese (>40)    Weight Change: no change    Plan:  Plan was established with patient:  Increase dietary fiber by consuming whole grains, fruits and vegetables: x  Limit dietary cholesterol to >200mg/day: Increase water intake:  Avoid added sugar: X  Avoid sweetened beverages: X  Choose lean meats: X      Monitoring: Will monitor weight:  Will monitor adherence to meal plan: Will monitor adherence to exercise plan: Will monitor HGA1c: X    Handouts Provided :  Low Carb snacking: X  Carb counting /individual meal plan:  Portion Control: X  Food Labels:  Physical Activity:  Low Fat/Cholesterol:  Hypo/Hyperglycemia:  Calorie Controlled Meal Plan:    Goals: Increase water consumption to 8oz. 6-8 times daily:  Manage blood sugars by consuming 3 meals spaced every 4-5 hours with 2-3 snacks daily: reviewed  Increase fiber and decrease fat intake by consuming 1-2 fruit servings and 2-3 vegetable servings per day. Increase physical activity by:  Consume less than 2,000mg of sodium/day  Avoid consumption of sweetened beverages and added sugar by reading food labels: reviewed  Monitor blood sugars by using meter to check blood glucose before morning meal and 2 hours after a meal daily:-120's, all <150 per patient's wife  Decrease risk of coronary heart disease by consuming fish that contains omega-3 fatty acids at least twice a week, avoiding partially hydrogenated oil/trans fats and limiting saturated fat intake by reading food labels:reviewed    Patient goals set:  1.  Reviewed having set meal times patient's wife, continue to work on this, eating 2-3 meals/day.  Quick/easy breakfast/dinner suggestions reviewed.  Goal is 3 meals daily spaced every 4-5 hours. 2. Reviewed how to measure out portions using a measuring cup or hand to estimate a serving size. Encouraged patient to limit carb intake to 60gms/meal, 15-30gms/snack.  Reviewed low carb snacking. 3. Reviewed plate method, encouraged patient to increase intake of non-starchy vegetables.  Goal is 1/2 of  plate to be non-starchy vegetables, 1/4 lean protein, 1/4 carbs. Foods from each category reviewed along with what a serving size is.    4. Reviewed avoiding/limiting sweets and sweetened drinks, patient is doing very well with this. Reviewed sugar free alternatives to sweets- sugar free jello, pudding, ect. Spoke with patient's wife, patient continues to do well they are avoiding sweets and he has stopped adding sugar to his coffee. Patient's wife has made changes to her diet, eating same foods as he is to help him. No questions at this time.  Will follow up in 3-4 weeks to review and answer questions    Cecil Herron

## 2021-01-27 NOTE — CARE COORDINATION
Ambulatory Care Coordination Note  1/27/2021  CM Risk Score: 2  Charlson 10 Year Mortality Risk Score: 23%     ACC: Татьяна Harris, RN    Summary Note: Per patient, last fall 2 days ago  Reports, he falls more often in the mornings  Reports, he feels dizzy and his legs just give out   Reviewed education for home safety and falls prevention   Reviewed and he refuses to use a cane, or walker, or rollator walker or w/c   Hes been checking BS twice daily; at morning FBS and before dinner   Morning FBS averages 110-120   BS before dinner averages less than 150   Reports, hes eating a healthy diet, and eating snack with protein at HS   eats 2-3 meals daily   aware of his CT appointment on 1/29  reports to have meds on hand and taking as prescribed    Encouraged contacting providers with changes/ questions/ concerns  CM plan to review;  chronic condition education, if any recent falls, outcome of head CT       Future Appointments   Date Time Provider Iggy Leger   1/29/2021  2:00 PM [facility] [place] [facility] RM [facility] CT [facility] [place]   3/11/2021  3:00 PM Yulia Hernandez, APRN - CNP [facility]           Diabetes Assessment    Medic Alert ID: No  Meal Planning: Calorie counting   How often do you test your blood sugar?: Other (Comment: tests 2 times daily. morning FBS and before dinner )   Do you have barriers with adherence to non-pharmacologic self-management interventions?  (Nutrition/Exercise/Self-Monitoring): Yes   Have you ever had to go to the ED for symptoms of low blood sugar?: No       Do you have hyperglycemia symptoms?: No   Do you have hypoglycemia symptoms?: No   Last Blood Sugar Value: 120   Blood Sugar Monitoring Regimen: Morning Fasting   Blood Sugar Trends: No Change       and   General Assessment    Do you have any symptoms that are causing concern?: Yes  Progression since Onset: Unchanged               Care Coordination Interventions    Program Enrollment: Complex Care  Referral from Primary Care Provider: No  Suggested Interventions and Community Resources  Diabetes Education: Declined  Fall Risk Prevention: Completed  Meals on Wheels: Completed  Registered Dietician: Completed  Social Work: Completed  Zone Management Tools: Completed (Comment: DM)         Goals Addressed                 This Visit's Progress     Conditions and Symptoms   No change     I will schedule office visits, as directed by my provider. I will notify my provider of any barriers to my plan of care. I will follow my Zone Management tool to seek urgent or emergent care. I will notify my provider of any symptoms that indicate a worsening of my condition. Barriers: newly Dx DM, overwhelmed by complexity of regimen and lack of education  Plan for overcoming my barriers: education and care coordination   Confidence: 7/10  Anticipated Goal Completion Date: 2/24/2021              Prior to Admission medications    Medication Sig Start Date End Date Taking?  Authorizing Provider   temazepam (RESTORIL) 15 MG capsule TAKE ONE CAPSULE BY MOUTH AT BEDTIME 1/25/21 5/26/21  LINUS Camargo CNP   levothyroxine (SYNTHROID) 88 MCG tablet TAKE 1 TABLET DAILY 1/25/21   LINUS Camargo CNP   oxybutynin (DITROPAN XL) 10 MG extended release tablet Take 1 tablet by mouth daily 1/11/21   LINUS Camargo CNP   Lancets MISC Test glucose 3 times daily and as needed 1/4/21   LINUS Camargo CNP   glucose monitoring kit (FREESTYLE) monitoring kit Supply glucometer, lancets and testing strips best covered by insurance 12/31/20   LINUS Camargo CNP   fluticasone (FLONASE) 50 MCG/ACT nasal spray 1 spray by Each Nostril route daily as needed for Rhinitis or Allergies    Historical Provider, MD   MISC NATURAL PRODUCTS PO Take 1 tablet by mouth daily as needed (gas) (Herbal Bean and Vegetable Gas Relief)    Historical Provider, MD   traZODone (DESYREL) 100 MG tablet Take 100 mg by mouth nightly 10/1/20 Historical Provider, MD   gabapentin (NEURONTIN) 300 MG capsule TAKE 1 CAPSULE FOUR TIMES A DAY 10/5/20 10/5/21  LINUS Gibson - CNP   famotidine (PEPCID) 40 MG tablet Take 1 tablet by mouth every evening 9/28/20   Jeniffer Stuart, LINUS - CNP   simvastatin (ZOCOR) 80 MG tablet TAKE 1 TABLET DAILY AT BEDTIME 9/21/20   Jenfifer Stuart APRN - CNP   midodrine (PROAMATINE) 2.5 MG tablet TAKE 1 TABLET THREE TIMES A DAY 9/21/20   Sonny Rivas MD   meloxicmauro [facility]) 15 MG tablet Take 1 tablet by mouth daily 7/21/20   LINUS Gibson - CNP   omeprazole (PRILOSEC) 40 MG delayed release capsule Take 1 capsule by mouth daily 6/3/20   Jeniffer Stuart, LINUS - CNP   topiramate (TOPAMAX) 50 MG tablet Take 1 tablet by mouth 2 times daily 5/26/20   Jeniffer Stuart, LINUS - CNP   citalopram (CELEXA) 20 MG tablet TAKE ONE TABLET BY MOUTH ONCE DAILY 5/26/20   Jeniffer Stuart, APRN - CNP   busPIRone (BUSPAR) 10 MG tablet Take 1 tablet by mouth 3 times daily 4/27/20   Jeniffer Stuart, LINUS - CNP   ezetimibe (ZETIA) 10 MG tablet TAKE 1 TABLET DAILY 12/26/19   Jeniffer Stuart, APRN - CNP   traMADol (ULTRAM) 50 MG tablet Take 50 mg by mouth every 6 hours as needed for Pain (migraine). Historical Provider, MD   Blood Pressure Monitoring (BLOOD PRESS MONITOR/M-L CUFF) MISC 1 kit by Does not apply route daily.  4/30/14   Annita Cade, DO       Future Appointments   Date Time Provider Iggy Leger   1/29/2021  2:00 PM [facility] [place] [facility] RM [facility] PB CT [facility] [place]   3/11/2021  3:00 PM LINUS Hernadez [facility]

## 2021-01-27 NOTE — ACP (ADVANCE CARE PLANNING)
Advance Care Planning   Healthcare Decision Maker:    Primary Decision Maker: Fely Chatterjee - [facility] - [phone]

## 2021-01-29 ENCOUNTER — HOSPITAL ENCOUNTER (OUTPATIENT)
Dept: CT IMAGING | Age: 71
Discharge: HOME | End: 2021-01-31
Payer: MEDICARE

## 2021-01-29 DIAGNOSIS — R42 DIZZINESS: ICD-10-CM

## 2021-01-29 DIAGNOSIS — Z91.81 STATUS POST FALL: ICD-10-CM

## 2021-01-29 PROCEDURE — 70450 CT HEAD/BRAIN W/O DYE: CPT

## 2021-02-01 DIAGNOSIS — Z86.73 HX-TIA (TRANSIENT ISCHEMIC ATTACK): ICD-10-CM

## 2021-02-01 DIAGNOSIS — R29.6 FREQUENT FALLS: Primary | ICD-10-CM

## 2021-02-05 DIAGNOSIS — E11.9 NEW ONSET TYPE 2 DIABETES MELLITUS (HCC): Primary | ICD-10-CM

## 2021-02-05 RX ORDER — GLUCOSAMINE HCL/CHONDROITIN SU 500-400 MG
CAPSULE ORAL
Qty: 180 STRIP | Refills: 3 | Status: SHIPPED | OUTPATIENT
Start: 2021-02-05 | End: 2021-11-16

## 2021-02-05 RX ORDER — BLOOD-GLUCOSE METER
KIT MISCELLANEOUS
Qty: 200 EACH | Refills: 3 | Status: SHIPPED | OUTPATIENT
Start: 2021-02-05 | End: 2021-11-16

## 2021-02-05 NOTE — TELEPHONE ENCOUNTER
Last VV 12/31/2021      Health Maintenance   Topic Date Due    AAA screen  1950    Diabetic foot exam  06/29/1960    Diabetic retinal exam  06/29/1960    COVID-19 Vaccine (1 of 2) 06/29/1966    Diabetic microalbuminuria test  06/29/1968    DTaP/Tdap/Td vaccine (1 - Tdap) 06/29/1969    Shingles Vaccine (2 of 3) 12/22/2017    Annual Wellness Visit (AWV)  07/22/2021 (Originally 5/29/2019)    TSH testing  09/28/2021    A1C test (Diabetic or Prediabetic)  11/20/2021    Lipid screen  11/20/2021    Colon cancer screen colonoscopy  06/04/2024    Flu vaccine  Completed    Pneumococcal 65+ years Vaccine  Completed    Hepatitis C screen  Completed    Hepatitis A vaccine  Aged Out    Hib vaccine  Aged Out    Meningococcal (ACWY) vaccine  Aged Out             (applicable per patient's age: Cancer Screenings, Depression Screening, Fall Risk Screening, Immunizations)    Hemoglobin A1C (%)   Date Value   11/20/2020 6.7 (H)   02/13/2017 5.9   11/10/2016 6.3     LDL Cholesterol (mg/dL)   Date Value   11/20/2020 33     LDL Calculated (mg/dL)   Date Value   06/03/2016 49     AST (U/L)   Date Value   11/20/2020 16     ALT (U/L)   Date Value   11/20/2020 19     BUN (mg/dL)   Date Value   11/20/2020 22      (goal A1C is < 7)   (goal LDL is <100) need 30-50% reduction from baseline     BP Readings from Last 3 Encounters:   09/28/20 124/76   12/26/19 108/62   11/22/19 120/88    (goal /80)      All Future Testing planned in [facility]:  Lab Frequency Next Occurrence       Next Visit Date:  Future Appointments   Date Time Provider Iggy Leger   3/11/2021  3:00 PM Yulia Felipe, APRN - CNP [facility]            Patient Active Problem List:     Hyperlipidemia     Anxiety     Insomnia     Osteoarthritis     Sleep apnea     Chronic midline low back pain without sciatica     Gastroesophageal reflux disease     Therapeutic opioid induced constipation     Acquired hypothyroidism     Chronic tension-type headache, not intractable     Hx-TIA (transient ischemic attack)     Fibromyalgia     Orthostatic hypotension     Diastolic dysfunction

## 2021-02-08 DIAGNOSIS — E78.1 HYPERTRIGLYCERIDEMIA: ICD-10-CM

## 2021-02-08 RX ORDER — EZETIMIBE 10 MG/1
TABLET ORAL
Qty: 90 TABLET | Refills: 3 | Status: SHIPPED | OUTPATIENT
Start: 2021-02-08 | End: 2022-02-03

## 2021-02-08 NOTE — TELEPHONE ENCOUNTER
Chronic tension-type headache, not intractable     Hx-TIA (transient ischemic attack)     Fibromyalgia     Orthostatic hypotension     Diastolic dysfunction

## 2021-02-11 ENCOUNTER — CARE COORDINATION (OUTPATIENT)
Dept: CARE COORDINATION | Age: 71
End: 2021-02-11

## 2021-02-11 NOTE — CARE COORDINATION
Ambulatory Care Coordination Note  2/11/2021  CM Risk Score: 2  Charlson 10 Year Mortality Risk Score: 23%     ACC: Judie Puentes, RN    Summary Note: outreach to review care management needs. LVM, requested call back         Care Coordination Interventions    Program Enrollment: Complex Care  Referral from Primary Care Provider: No  Suggested Interventions and Community Resources  Diabetes Education: Declined  Fall Risk Prevention: Completed  Meals on Wheels: Completed  Registered Dietician: Completed  Social Work: Completed  Zone Management Tools: Completed (Comment: DM)         Goals Addressed    None         Prior to Admission medications    Medication Sig Start Date End Date Taking? Authorizing Provider   ezetimibe (ZETIA) 10 MG tablet TAKE 1 TABLET DAILY 2/8/21   LINUS Diaz CNP   blood glucose monitor strips Test 2  times a day & as needed for symptoms of irregular blood glucose. Dispense sufficient amount for indicated testing frequency plus additional to accommodate PRN testing needs.  2/5/21   LINUS Meza CNP   blood glucose test strips (FREESTYLE LITE) strip Use to test blood sugar twice daily and as needed for symptoms of irregular blood sugar 2/5/21   LINUS Meza CNP   temazepam (RESTORIL) 15 MG capsule TAKE ONE CAPSULE BY MOUTH AT BEDTIME 1/25/21 5/26/21  LINUS Dinh CNP   levothyroxine (SYNTHROID) 88 MCG tablet TAKE 1 TABLET DAILY 1/25/21   LINUS Dinh CNP   oxybutynin (DITROPAN XL) 10 MG extended release tablet Take 1 tablet by mouth daily 1/11/21   LINUS Dinh CNP   Lancets MISC Test glucose 3 times daily and as needed 1/4/21   LINUS Dinh CNP   glucose monitoring kit (FREESTYLE) monitoring kit Supply glucometer, lancets and testing strips best covered by insurance 12/31/20   LINUS Dinh CNP   fluticasone (FLONASE) 50 MCG/ACT nasal spray 1 spray by Each Nostril route daily as needed for Rhinitis or Allergies    Historical Provider, MD   MISC NATURAL PRODUCTS PO Take 1 tablet by mouth daily as needed (gas) (Herbal Bean and Vegetable Gas Relief)    Historical Provider, MD   traZODone (DESYREL) 100 MG tablet Take 100 mg by mouth nightly 10/1/20   Historical Provider, MD   gabapentin (NEURONTIN) 300 MG capsule TAKE 1 CAPSULE FOUR TIMES A DAY 10/5/20 10/5/21  LINUS Arenas CNP   famotidine (PEPCID) 40 MG tablet Take 1 tablet by mouth every evening 9/28/20   LINUS Arenas CNP   simvastatin (ZOCOR) 80 MG tablet TAKE 1 TABLET DAILY AT BEDTIME 9/21/20   LINUS Arenas CNP   midodrine (PROAMATINE) 2.5 MG tablet TAKE 1 TABLET THREE TIMES A DAY 9/21/20   Inez Spaulding MD   meloxicam (MOBIC) 15 MG tablet Take 1 tablet by mouth daily 7/21/20   LINUS Arenas CNP   omeprazole (PRILOSEC) 40 MG delayed release capsule Take 1 capsule by mouth daily 6/3/20   LINUS Arenas CNP   topiramate (TOPAMAX) 50 MG tablet Take 1 tablet by mouth 2 times daily 5/26/20   LINUS Arenas CNP   citalopram (CELEXA) 20 MG tablet TAKE ONE TABLET BY MOUTH ONCE DAILY 5/26/20   LINUS Arenas CNP   busPIRone (BUSPAR) 10 MG tablet Take 1 tablet by mouth 3 times daily 4/27/20   LINUS Arenas CNP   traMADol (ULTRAM) 50 MG tablet Take 50 mg by mouth every 6 hours as needed for Pain (migraine). Historical Provider, MD   Blood Pressure Monitoring (BLOOD PRESS MONITOR/M-L CUFF) MISC 1 kit by Does not apply route daily.  4/30/14   Jeralene Baumgarten,        Future Appointments   Date Time Provider Iggy Leger   3/11/2021  3:00 PM LINUS Arenas CNP [facility]   5/5/2021  2:20 PM Adria Mg MD [address]

## 2021-02-17 ENCOUNTER — CARE COORDINATION (OUTPATIENT)
Dept: CARE COORDINATION | Age: 71
End: 2021-02-17

## 2021-02-17 ENCOUNTER — TELEPHONE (OUTPATIENT)
Dept: PRIMARY CARE CLINIC | Age: 71
End: 2021-02-17

## 2021-02-17 NOTE — CARE COORDINATION
Ambulatory Care Coordination Note  2/17/2021  CM Risk Score: 2  Charlson 10 Year Mortality Risk Score: 23%     ACC: Subhash Pedro RN    Summary Note: Spoke to patient & wife. morning FBS ranging 90- 110. Evening BS; ranging 120-170  Per wife, has had several episodes of confusion and nausea   Reviewed testing BS and s/s of low BS   Reports, hes been eating 2-3 meals and also before bedtime  Reports, taking meds as prescribed  Has meal delivery service  Wife has many questions and support provided   Encouraged contacting providers with changes/ questions/ concerns   CM plan; DM education, review BS      Diabetes Assessment    Medic Alert ID: No  Meal Planning: Calorie counting   How often do you test your blood sugar?: Other (Comment: tests 2 times daily. morning FBS and before dinner )   Do you have barriers with adherence to non-pharmacologic self-management interventions? (Nutrition/Exercise/Self-Monitoring): Yes   Have you ever had to go to the ED for symptoms of low blood sugar?: No       Blood Sugar Trends: Fluctuating       and   General Assessment    Do you have any symptoms that are causing concern?: No               Care Coordination Interventions    Program Enrollment: Complex Care  Referral from Primary Care Provider: No  Suggested Interventions and Community Resources  Diabetes Education: Declined  Fall Risk Prevention: Completed  Meals on Wheels: Completed  Registered Dietician: Completed  Social Work: Completed  Zone Management Tools: Completed (Comment: DM)         Goals Addressed                 This Visit's Progress     Conditions and Symptoms   Worsening     I will schedule office visits, as directed by my provider. I will notify my provider of any barriers to my plan of care. I will follow my Zone Management tool to seek urgent or emergent care. I will notify my provider of any symptoms that indicate a worsening of my condition.     Barriers: newly Dx DM, overwhelmed by complexity of tablet by mouth every evening 9/28/20   Ivory Fess, LINUS - CNP   simvastatin (ZOCOR) 80 MG tablet TAKE 1 TABLET DAILY AT BEDTIME 9/21/20   Ivory Fess, LINUS - CNP   midodrine (PROAMATINE) 2.5 MG tablet TAKE 1 TABLET THREE TIMES A DAY 9/21/20   Janett Victor MD   [facility] 15 MG tablet Take 1 tablet by mouth daily 7/21/20   Ivory Fess, LINUS - CNP   omeprazole (PRILOSEC) 40 MG delayed release capsule Take 1 capsule by mouth daily 6/3/20   Ivory Fess, APRN - CNP   topiramate (TOPAMAX) 50 MG tablet Take 1 tablet by mouth 2 times daily 5/26/20   Ivory Fess, LINUS - CNP   citalopram (CELEXA) 20 MG tablet TAKE ONE TABLET BY MOUTH ONCE DAILY 5/26/20   Ivory Fess, LINUS - CNP   busPIRone (BUSPAR) 10 MG tablet Take 1 tablet by mouth 3 times daily 4/27/20   Ivory Fess, LINUS - CNP   traMADol (ULTRAM) 50 MG tablet Take 50 mg by mouth every 6 hours as needed for Pain (migraine). Historical Provider, MD   Blood Pressure Monitoring (BLOOD PRESS MONITOR/M-L CUFF) MISC 1 kit by Does not apply route daily.  4/30/14   Arash Gillespie, DO       Future Appointments   Date Time Provider Iggy Leger   3/11/2021  3:00 PM IvLINUS Lopez CNP [place]  [email or web address]   5/5/2021  2:20 PM Ashlyn Draper MD [address]

## 2021-02-17 NOTE — TELEPHONE ENCOUNTER
Please inform pt and wife that this can be a result of hormone changes in the morning preparing for the day. Blood glucose can increase in the early morning hours to help increase energy for waking and daytime activity. Recommend to reduce carbs before bedtime and see if this makes difference. It can also be a rebound effect if he is running low in the middle of the night, recommend protein snack before bed to help sustain blood glucose while sleeping. His last hba1c was 6.7 in November which is good, looks like he is due for recheck though; this will give us a better idea of overall blood glucose control. I see they have appt next month, can be seen sooner if needed.

## 2021-02-17 NOTE — TELEPHONE ENCOUNTER
Pts wife called in today very frustrated & not knowing what to do about pts BS levels. Wife states he will wake up and his BS might be 90 and then it shoots up to  167. Please advise how to proceed.

## 2021-02-18 ENCOUNTER — CARE COORDINATION (OUTPATIENT)
Dept: CARE COORDINATION | Age: 71
End: 2021-02-18

## 2021-02-18 NOTE — TELEPHONE ENCOUNTER
Called and spoke with patients wife cayden and gave her all the information regarding blood sugars. She also mentioned to me that patient has falling 2 times in the past 24 hours, I asked her if patient has hit his head or injured, she says he has hit his head but patient says he has a hard head and is fine. I then was placed on the phone with the patient who claims that he doesn't remember falling 2 times only once and the only thing that hurts is a pulled muscle in he neck/shoulder area. I let patient know since he did hit his head I recommend he go to the ER to be evaluated further, patient not agreeable to this at this moment. I then was placed back on the phone with patients wife and reiterated the importance of being evaluated after he hit his head, advised her to watch for any signs or symptoms of mental status changes and go to ER if she notices anything, she verbalized understanding. Asked if she wanted to move the March appt up sooner and she declined.

## 2021-02-18 NOTE — CARE COORDINATION
Nutrition Care Coordinator Follow-Up visit:    Food Recall: eating 3 meals/d    Activity Level:  Sedentary: X  Lightly Active: Moderately Active:  Very Active:    Adult BMI:  Underweight (below 18.5)  Normal Weight (18.5-24.9)  Overweight (25-29. 9) X  Obese (30-39. 9)  Morbidly Obese (>40)    Weight Change: no change    Plan:  Plan was established with patient:  Increase dietary fiber by consuming whole grains, fruits and vegetables: X  Limit dietary cholesterol to >200mg/day: Increase water intake:  Avoid added sugar: X  Avoid sweetened beverages: X  Choose lean meats:      Monitoring: Will monitor weight:  Will monitor adherence to meal plan: Will monitor adherence to exercise plan: Will monitor HGA1c: X    Handouts Provided :  Low Carb snacking: X  Carb counting /individual meal plan:  Portion Control: X  Food Labels:  Physical Activity:  Low Fat/Cholesterol:  Hypo/Hyperglycemia:  Calorie Controlled Meal Plan:    Goals: Increase water consumption to 8oz. 6-8 times daily:  Manage blood sugars by consuming 3 meals spaced every 4-5 hours with 2-3 snacks daily:reviewed  Increase fiber and decrease fat intake by consuming 1-2 fruit servings and 2-3 vegetable servings per day. Increase physical activity by:  Consume less than 2,000mg of sodium/day  Avoid consumption of sweetened beverages and added sugar by reading food labels: reviewed  Monitor blood sugars by using meter to check blood glucose before morning meal and 2 hours after a meal daily:BS  per patient's wife  Decrease risk of coronary heart disease by consuming fish that contains omega-3 fatty acids at least twice a week, avoiding partially hydrogenated oil/trans fats and limiting saturated fat intake by reading food labels:reviewed    Patient goals set:  1.  Reviewed having set meal times patient's wife, continue to work on this, eating 2-3 meals/day.  Quick/easy breakfast/dinner suggestions reviewed.  Goal is 3 meals daily spaced every 4-5 hours.   2. Reviewed measuring out portions using a measuring cup or hand to estimate a serving size. Encouraged patient to limit carb intake to 60gms/meal, 15-30gms/snack.  Reviewed low carb snacking. 3. Reviewed plate method, encouraged patient to increase intake of non-starchy vegetables.  Goal is 1/2 of  plate to be non-starchy vegetables, 1/4 lean protein, 1/4 carbs. Foods from each category reviewed along with what a serving size is.    4. Reviewed avoiding/limiting sweets and sweetened drinks, patient has cut out sugary drinks. Reviewed sugar free alternatives to sweets- sugar free jello, pudding, ect. Spoke with patient's wife, patient continues to do well they are avoiding sweets and blood sugars are stable most-120-125. Patient's wife has no questions at this time.  Will follow up in 3-4 weeks to review and answer questions     Jacob Hager

## 2021-03-03 ENCOUNTER — CARE COORDINATION (OUTPATIENT)
Dept: CARE COORDINATION | Age: 71
End: 2021-03-03

## 2021-03-03 NOTE — CARE COORDINATION
Ambulatory Care Coordination Note  3/3/2021  CM Risk Score: 2  Charlson 10 Year Mortality Risk Score: 23%     ACC: Jonna Sampson RN    Summary Note: outreach to review care management needs. Unable to contact, unable to leave a message         Care Coordination Interventions    Program Enrollment: Complex Care  Referral from Primary Care Provider: No  Suggested Interventions and Community Resources  Diabetes Education: Declined  Fall Risk Prevention: Completed  Meals on Wheels: Completed  Registered Dietician: Completed  Social Work: Completed  Zone Management Tools: Completed (Comment: DM)         Goals Addressed    None         Prior to Admission medications    Medication Sig Start Date End Date Taking? Authorizing Provider   ezetimibe (ZETIA) 10 MG tablet TAKE 1 TABLET DAILY 2/8/21   LINUS Mendosa CNP   blood glucose monitor strips Test 2  times a day & as needed for symptoms of irregular blood glucose. Dispense sufficient amount for indicated testing frequency plus additional to accommodate PRN testing needs.  2/5/21   LINUS Damon CNP   blood glucose test strips (FREESTYLE LITE) strip Use to test blood sugar twice daily and as needed for symptoms of irregular blood sugar 2/5/21   LINUS Damon CNP   temazepam (RESTORIL) 15 MG capsule TAKE ONE CAPSULE BY MOUTH AT BEDTIME 1/25/21 5/26/21  Floydene Jeans, APRN - CNP   levothyroxine (SYNTHROID) 88 MCG tablet TAKE 1 TABLET DAILY 1/25/21   Floydene Jeans, APRN - CNP   oxybutynin (DITROPAN XL) 10 MG extended release tablet Take 1 tablet by mouth daily 1/11/21   Floydene Jeans, APRN - CNP   Lancets MISC Test glucose 3 times daily and as needed 1/4/21   Floydene Jeans, APRN - CNP   glucose monitoring kit (FREESTYLE) monitoring kit Supply glucometer, lancets and testing strips best covered by insurance 12/31/20   Floydene Jeans, APRN - CNP   fluticasone (FLONASE) 50 MCG/ACT nasal spray 1 spray by Each Nostril route daily as needed for Rhinitis or Allergies    Historical Provider, MD   [facility] Take 1 tablet by mouth daily as needed (gas) (Herbal Bean and Vegetable Gas Relief)    Historical Provider, MD   traZODone (DESYREL) 100 MG tablet Take 100 mg by mouth nightly 10/1/20   Historical Provider, MD   gabapentin (NEURONTIN) 300 MG capsule TAKE 1 CAPSULE FOUR TIMES A DAY 10/5/20 10/5/21  LINUS Camargo CNP   famotidine (PEPCID) 40 MG tablet Take 1 tablet by mouth every evening 9/28/20   LINUS Camargo CNP   simvastatin (ZOCOR) 80 MG tablet TAKE 1 TABLET DAILY AT BEDTIME 9/21/20   LINUS Camargo CNP   midodrine (PROAMATINE) 2.5 MG tablet TAKE 1 TABLET THREE TIMES A DAY 9/21/20   Kristy Christine MD   meloxicam (MOBIC) 15 MG tablet Take 1 tablet by mouth daily 7/21/20   LINUS Camargo CNP   omeprazole (PRILOSEC) 40 MG delayed release capsule Take 1 capsule by mouth daily 6/3/20   LINUS Camargo CNP   topiramate (TOPAMAX) 50 MG tablet Take 1 tablet by mouth 2 times daily 5/26/20   LINUS Camargo CNP   citalopram (CELEXA) 20 MG tablet TAKE ONE TABLET BY MOUTH ONCE DAILY 5/26/20   LINUS Camargo CNP   busPIRone (BUSPAR) 10 MG tablet Take 1 tablet by mouth 3 times daily 4/27/20   LINUS Camargo CNP   traMADol (ULTRAM) 50 MG tablet Take 50 mg by mouth every 6 hours as needed for Pain (migraine). Historical Provider, MD   Blood Pressure Monitoring (BLOOD PRESS MONITOR/M-L CUFF) MISC 1 kit by Does not apply route daily.  4/30/14   Max Hernandez, DO       Future Appointments   Date Time Provider Iggy Leger   3/10/2021  2:00 PM Presley Milton MD [facility]   3/11/2021  3:00 PM LINUS Camargo CNP [facility]

## 2021-03-07 ENCOUNTER — APPOINTMENT (OUTPATIENT)
Dept: CT IMAGING | Age: 71
End: 2021-03-07
Payer: MEDICARE

## 2021-03-07 ENCOUNTER — NURSE TRIAGE (OUTPATIENT)
Dept: OTHER | Age: 71
End: 2021-03-07

## 2021-03-07 ENCOUNTER — HOSPITAL ENCOUNTER (OUTPATIENT)
Age: 71
Setting detail: OBSERVATION
Discharge: HOME | End: 2021-03-09
Attending: INTERNAL MEDICINE | Admitting: INTERNAL MEDICINE
Payer: MEDICARE

## 2021-03-07 DIAGNOSIS — R55 SYNCOPE AND COLLAPSE: Primary | ICD-10-CM

## 2021-03-07 LAB
ABSOLUTE EOS #: 0.1 K/UL (ref 0–0.4)
ABSOLUTE IMMATURE GRANULOCYTE: ABNORMAL K/UL (ref 0–0.3)
ABSOLUTE LYMPH #: 1.4 K/UL (ref 1–4.8)
ABSOLUTE MONO #: 0.4 K/UL (ref 0.1–1.2)
ALBUMIN SERPL-MCNC: 4.5 G/DL (ref 3.5–5.2)
ALBUMIN/GLOBULIN RATIO: 1.7 (ref 1–2.5)
ALP BLD-CCNC: 80 U/L (ref 40–129)
ALT SERPL-CCNC: 12 U/L (ref 5–41)
ANION GAP SERPL CALCULATED.3IONS-SCNC: 10 MMOL/L (ref 9–17)
AST SERPL-CCNC: 12 U/L
BASOPHILS # BLD: 1 % (ref 0–2)
BASOPHILS ABSOLUTE: 0.1 K/UL (ref 0–0.2)
BILIRUB SERPL-MCNC: 0.68 MG/DL (ref 0.3–1.2)
BILIRUBIN DIRECT: 0.2 MG/DL
BILIRUBIN URINE: NEGATIVE
BILIRUBIN, INDIRECT: 0.48 MG/DL (ref 0–1)
BNP INTERPRETATION: NORMAL
BUN BLDV-MCNC: 21 MG/DL (ref 8–23)
BUN/CREAT BLD: ABNORMAL (ref 9–20)
CALCIUM SERPL-MCNC: 9.3 MG/DL (ref 8.6–10.4)
CHLORIDE BLD-SCNC: 105 MMOL/L (ref 98–107)
CO2: 26 MMOL/L (ref 20–31)
COLOR: YELLOW
COMMENT UA: NORMAL
CREAT SERPL-MCNC: 1.15 MG/DL (ref 0.7–1.2)
D-DIMER QUANTITATIVE: 5.11 MG/L FEU
DIFFERENTIAL TYPE: ABNORMAL
EOSINOPHILS RELATIVE PERCENT: 2 % (ref 1–4)
GFR AFRICAN AMERICAN: >60 ML/MIN
GFR NON-AFRICAN AMERICAN: >60 ML/MIN
GFR SERPL CREATININE-BSD FRML MDRD: ABNORMAL ML/MIN/{1.73_M2}
GFR SERPL CREATININE-BSD FRML MDRD: ABNORMAL ML/MIN/{1.73_M2}
GLOBULIN: NORMAL G/DL (ref 1.5–3.8)
GLUCOSE BLD-MCNC: 128 MG/DL (ref 70–99)
GLUCOSE URINE: NEGATIVE
HCT VFR BLD CALC: 46.8 % (ref 41–53)
HEMOGLOBIN: 15.5 G/DL (ref 13.5–17.5)
IMMATURE GRANULOCYTES: ABNORMAL %
KETONES, URINE: NEGATIVE
LEUKOCYTE ESTERASE, URINE: NEGATIVE
LYMPHOCYTES # BLD: 21 % (ref 24–44)
MCH RBC QN AUTO: 31 PG (ref 26–34)
MCHC RBC AUTO-ENTMCNC: 33.2 G/DL (ref 31–37)
MCV RBC AUTO: 93.3 FL (ref 80–100)
MONOCYTES # BLD: 6 % (ref 2–11)
NITRITE, URINE: NEGATIVE
NRBC AUTOMATED: ABNORMAL PER 100 WBC
PDW BLD-RTO: 13.3 % (ref 12.5–15.4)
PH UA: 5.5 (ref 5–8)
PLATELET # BLD: 132 K/UL (ref 140–450)
PLATELET ESTIMATE: ABNORMAL
PMV BLD AUTO: 10.1 FL (ref 6–12)
POTASSIUM SERPL-SCNC: 4.5 MMOL/L (ref 3.7–5.3)
PRO-BNP: 39 PG/ML
PROTEIN UA: NEGATIVE
RBC # BLD: 5.02 M/UL (ref 4.5–5.9)
RBC # BLD: ABNORMAL 10*6/UL
SARS-COV-2, RAPID: NOT DETECTED
SEG NEUTROPHILS: 70 % (ref 36–66)
SEGMENTED NEUTROPHILS ABSOLUTE COUNT: 4.8 K/UL (ref 1.8–7.7)
SODIUM BLD-SCNC: 141 MMOL/L (ref 135–144)
SPECIFIC GRAVITY UA: 1.01 (ref 1–1.03)
SPECIMEN DESCRIPTION: NORMAL
TOTAL PROTEIN: 7.1 G/DL (ref 6.4–8.3)
TROPONIN INTERP: NORMAL
TROPONIN T: NORMAL NG/ML
TROPONIN, HIGH SENSITIVITY: 11 NG/L (ref 0–22)
TROPONIN, HIGH SENSITIVITY: 11 NG/L (ref 0–22)
TROPONIN, HIGH SENSITIVITY: 13 NG/L (ref 0–22)
TSH SERPL DL<=0.05 MIU/L-ACNC: 1.07 MIU/L (ref 0.3–5)
TURBIDITY: CLEAR
URINE HGB: NEGATIVE
UROBILINOGEN, URINE: NORMAL
WBC # BLD: 6.8 K/UL (ref 3.5–11)
WBC # BLD: ABNORMAL 10*3/UL

## 2021-03-07 PROCEDURE — G0378 HOSPITAL OBSERVATION PER HR: HCPCS

## 2021-03-07 PROCEDURE — 70450 CT HEAD/BRAIN W/O DYE: CPT

## 2021-03-07 PROCEDURE — 2580000003 HC RX 258: Performed by: EMERGENCY MEDICINE

## 2021-03-07 PROCEDURE — 71260 CT THORAX DX C+: CPT

## 2021-03-07 PROCEDURE — 96360 HYDRATION IV INFUSION INIT: CPT

## 2021-03-07 PROCEDURE — 36415 COLL VENOUS BLD VENIPUNCTURE: CPT

## 2021-03-07 PROCEDURE — 84484 ASSAY OF TROPONIN QUANT: CPT

## 2021-03-07 PROCEDURE — 96361 HYDRATE IV INFUSION ADD-ON: CPT

## 2021-03-07 PROCEDURE — 83880 ASSAY OF NATRIURETIC PEPTIDE: CPT

## 2021-03-07 PROCEDURE — U0002 COVID-19 LAB TEST NON-CDC: HCPCS

## 2021-03-07 PROCEDURE — 85025 COMPLETE CBC W/AUTO DIFF WBC: CPT

## 2021-03-07 PROCEDURE — 72125 CT NECK SPINE W/O DYE: CPT

## 2021-03-07 PROCEDURE — 99285 EMERGENCY DEPT VISIT HI MDM: CPT

## 2021-03-07 PROCEDURE — 85379 FIBRIN DEGRADATION QUANT: CPT

## 2021-03-07 PROCEDURE — 6360000004 HC RX CONTRAST MEDICATION: Performed by: EMERGENCY MEDICINE

## 2021-03-07 PROCEDURE — 84443 ASSAY THYROID STIM HORMONE: CPT

## 2021-03-07 PROCEDURE — 2580000003 HC RX 258: Performed by: NURSE PRACTITIONER

## 2021-03-07 PROCEDURE — 81003 URINALYSIS AUTO W/O SCOPE: CPT

## 2021-03-07 PROCEDURE — 93005 ELECTROCARDIOGRAM TRACING: CPT | Performed by: EMERGENCY MEDICINE

## 2021-03-07 PROCEDURE — 6370000000 HC RX 637 (ALT 250 FOR IP): Performed by: NURSE PRACTITIONER

## 2021-03-07 PROCEDURE — 80076 HEPATIC FUNCTION PANEL: CPT

## 2021-03-07 PROCEDURE — 80048 BASIC METABOLIC PNL TOTAL CA: CPT

## 2021-03-07 RX ORDER — TOPIRAMATE 25 MG/1
50 TABLET ORAL 2 TIMES DAILY
Status: DISCONTINUED | OUTPATIENT
Start: 2021-03-07 | End: 2021-03-09 | Stop reason: HOSPADM

## 2021-03-07 RX ORDER — ACETAMINOPHEN 325 MG/1
650 TABLET ORAL EVERY 6 HOURS PRN
Status: DISCONTINUED | OUTPATIENT
Start: 2021-03-07 | End: 2021-03-09 | Stop reason: HOSPADM

## 2021-03-07 RX ORDER — MELOXICAM 7.5 MG/1
15 TABLET ORAL DAILY
Status: DISCONTINUED | OUTPATIENT
Start: 2021-03-07 | End: 2021-03-09 | Stop reason: HOSPADM

## 2021-03-07 RX ORDER — ATORVASTATIN CALCIUM 40 MG/1
40 TABLET, FILM COATED ORAL DAILY
Status: DISCONTINUED | OUTPATIENT
Start: 2021-03-07 | End: 2021-03-09 | Stop reason: HOSPADM

## 2021-03-07 RX ORDER — FLUTICASONE PROPIONATE 50 MCG
1 SPRAY, SUSPENSION (ML) NASAL DAILY PRN
Status: DISCONTINUED | OUTPATIENT
Start: 2021-03-07 | End: 2021-03-09 | Stop reason: HOSPADM

## 2021-03-07 RX ORDER — NICOTINE 21 MG/24HR
1 PATCH, TRANSDERMAL 24 HOURS TRANSDERMAL DAILY PRN
Status: DISCONTINUED | OUTPATIENT
Start: 2021-03-07 | End: 2021-03-09 | Stop reason: HOSPADM

## 2021-03-07 RX ORDER — POTASSIUM CHLORIDE 20 MEQ/1
40 TABLET, EXTENDED RELEASE ORAL PRN
Status: DISCONTINUED | OUTPATIENT
Start: 2021-03-07 | End: 2021-03-09 | Stop reason: HOSPADM

## 2021-03-07 RX ORDER — ACETAMINOPHEN 650 MG/1
650 SUPPOSITORY RECTAL EVERY 6 HOURS PRN
Status: DISCONTINUED | OUTPATIENT
Start: 2021-03-07 | End: 2021-03-09 | Stop reason: HOSPADM

## 2021-03-07 RX ORDER — SODIUM CHLORIDE 0.9 % (FLUSH) 0.9 %
10 SYRINGE (ML) INJECTION EVERY 12 HOURS SCHEDULED
Status: DISCONTINUED | OUTPATIENT
Start: 2021-03-07 | End: 2021-03-09 | Stop reason: HOSPADM

## 2021-03-07 RX ORDER — LEVOTHYROXINE SODIUM 88 UG/1
88 TABLET ORAL DAILY
Status: DISCONTINUED | OUTPATIENT
Start: 2021-03-07 | End: 2021-03-09 | Stop reason: HOSPADM

## 2021-03-07 RX ORDER — 0.9 % SODIUM CHLORIDE 0.9 %
80 INTRAVENOUS SOLUTION INTRAVENOUS ONCE
Status: COMPLETED | OUTPATIENT
Start: 2021-03-07 | End: 2021-03-07

## 2021-03-07 RX ORDER — PROMETHAZINE HYDROCHLORIDE 25 MG/1
12.5 TABLET ORAL EVERY 6 HOURS PRN
Status: DISCONTINUED | OUTPATIENT
Start: 2021-03-07 | End: 2021-03-09 | Stop reason: HOSPADM

## 2021-03-07 RX ORDER — 0.9 % SODIUM CHLORIDE 0.9 %
1000 INTRAVENOUS SOLUTION INTRAVENOUS ONCE
Status: COMPLETED | OUTPATIENT
Start: 2021-03-07 | End: 2021-03-07

## 2021-03-07 RX ORDER — CITALOPRAM 20 MG/1
20 TABLET ORAL DAILY
Status: DISCONTINUED | OUTPATIENT
Start: 2021-03-07 | End: 2021-03-09 | Stop reason: HOSPADM

## 2021-03-07 RX ORDER — POLYETHYLENE GLYCOL 3350 17 G/17G
17 POWDER, FOR SOLUTION ORAL DAILY PRN
Status: DISCONTINUED | OUTPATIENT
Start: 2021-03-07 | End: 2021-03-09 | Stop reason: HOSPADM

## 2021-03-07 RX ORDER — SODIUM CHLORIDE 0.9 % (FLUSH) 0.9 %
10 SYRINGE (ML) INJECTION PRN
Status: DISCONTINUED | OUTPATIENT
Start: 2021-03-07 | End: 2021-03-07

## 2021-03-07 RX ORDER — PANTOPRAZOLE SODIUM 40 MG/1
40 TABLET, DELAYED RELEASE ORAL
Status: DISCONTINUED | OUTPATIENT
Start: 2021-03-08 | End: 2021-03-09 | Stop reason: HOSPADM

## 2021-03-07 RX ORDER — SODIUM CHLORIDE 9 MG/ML
INJECTION, SOLUTION INTRAVENOUS CONTINUOUS
Status: ACTIVE | OUTPATIENT
Start: 2021-03-07 | End: 2021-03-08

## 2021-03-07 RX ORDER — TRAZODONE HYDROCHLORIDE 50 MG/1
100 TABLET ORAL NIGHTLY
Status: DISCONTINUED | OUTPATIENT
Start: 2021-03-07 | End: 2021-03-08

## 2021-03-07 RX ORDER — ONDANSETRON 2 MG/ML
4 INJECTION INTRAMUSCULAR; INTRAVENOUS EVERY 6 HOURS PRN
Status: DISCONTINUED | OUTPATIENT
Start: 2021-03-07 | End: 2021-03-09 | Stop reason: HOSPADM

## 2021-03-07 RX ORDER — POTASSIUM CHLORIDE 7.45 MG/ML
10 INJECTION INTRAVENOUS PRN
Status: DISCONTINUED | OUTPATIENT
Start: 2021-03-07 | End: 2021-03-09 | Stop reason: HOSPADM

## 2021-03-07 RX ORDER — EZETIMIBE 10 MG/1
10 TABLET ORAL NIGHTLY
Status: DISCONTINUED | OUTPATIENT
Start: 2021-03-07 | End: 2021-03-09 | Stop reason: HOSPADM

## 2021-03-07 RX ORDER — BUSPIRONE HYDROCHLORIDE 5 MG/1
10 TABLET ORAL 3 TIMES DAILY
Status: DISCONTINUED | OUTPATIENT
Start: 2021-03-07 | End: 2021-03-09 | Stop reason: HOSPADM

## 2021-03-07 RX ORDER — MIDODRINE HYDROCHLORIDE 5 MG/1
5 TABLET ORAL
Status: DISCONTINUED | OUTPATIENT
Start: 2021-03-07 | End: 2021-03-08

## 2021-03-07 RX ORDER — MAGNESIUM SULFATE 1 G/100ML
1000 INJECTION INTRAVENOUS PRN
Status: DISCONTINUED | OUTPATIENT
Start: 2021-03-07 | End: 2021-03-09 | Stop reason: HOSPADM

## 2021-03-07 RX ORDER — SODIUM CHLORIDE 0.9 % (FLUSH) 0.9 %
10 SYRINGE (ML) INJECTION PRN
Status: DISCONTINUED | OUTPATIENT
Start: 2021-03-07 | End: 2021-03-09 | Stop reason: HOSPADM

## 2021-03-07 RX ORDER — OXYBUTYNIN CHLORIDE 5 MG/1
10 TABLET, EXTENDED RELEASE ORAL DAILY
Status: DISCONTINUED | OUTPATIENT
Start: 2021-03-07 | End: 2021-03-09 | Stop reason: HOSPADM

## 2021-03-07 RX ORDER — FAMOTIDINE 20 MG/1
40 TABLET, FILM COATED ORAL EVERY EVENING
Status: DISCONTINUED | OUTPATIENT
Start: 2021-03-07 | End: 2021-03-09 | Stop reason: HOSPADM

## 2021-03-07 RX ORDER — GABAPENTIN 300 MG/1
300 CAPSULE ORAL 4 TIMES DAILY
Status: DISCONTINUED | OUTPATIENT
Start: 2021-03-07 | End: 2021-03-08

## 2021-03-07 RX ADMIN — ATORVASTATIN CALCIUM 40 MG: 40 TABLET, FILM COATED ORAL at 20:13

## 2021-03-07 RX ADMIN — EZETIMIBE 10 MG: 10 TABLET ORAL at 20:13

## 2021-03-07 RX ADMIN — SODIUM CHLORIDE 1000 ML: 9 INJECTION, SOLUTION INTRAVENOUS at 13:23

## 2021-03-07 RX ADMIN — FAMOTIDINE 40 MG: 20 TABLET, FILM COATED ORAL at 20:13

## 2021-03-07 RX ADMIN — SODIUM CHLORIDE: 9 INJECTION, SOLUTION INTRAVENOUS at 20:12

## 2021-03-07 RX ADMIN — SODIUM CHLORIDE 80 ML: 9 INJECTION, SOLUTION INTRAVENOUS at 14:46

## 2021-03-07 RX ADMIN — OXYBUTYNIN CHLORIDE 10 MG: 5 TABLET, EXTENDED RELEASE ORAL at 20:14

## 2021-03-07 RX ADMIN — TOPIRAMATE 50 MG: 25 TABLET, FILM COATED ORAL at 20:14

## 2021-03-07 RX ADMIN — MELOXICAM 15 MG: 7.5 TABLET ORAL at 20:14

## 2021-03-07 RX ADMIN — TRAZODONE HYDROCHLORIDE 100 MG: 50 TABLET ORAL at 20:13

## 2021-03-07 RX ADMIN — IOPAMIDOL 75 ML: 755 INJECTION, SOLUTION INTRAVENOUS at 14:45

## 2021-03-07 RX ADMIN — BUSPIRONE HYDROCHLORIDE 10 MG: 5 TABLET ORAL at 20:13

## 2021-03-07 RX ADMIN — SODIUM CHLORIDE, PRESERVATIVE FREE 10 ML: 5 INJECTION INTRAVENOUS at 20:23

## 2021-03-07 RX ADMIN — SODIUM CHLORIDE, PRESERVATIVE FREE 10 ML: 5 INJECTION INTRAVENOUS at 14:46

## 2021-03-07 RX ADMIN — GABAPENTIN 300 MG: 300 CAPSULE ORAL at 20:13

## 2021-03-07 RX ADMIN — CITALOPRAM HYDROBROMIDE 20 MG: 20 TABLET, FILM COATED ORAL at 20:13

## 2021-03-07 ASSESSMENT — PAIN SCALES - GENERAL: PAINLEVEL_OUTOF10: 0

## 2021-03-07 NOTE — ED PROVIDER NOTES
Dispense sufficient amount for indicated testing frequency plus additional to accommodate PRN testing needs. BLOOD GLUCOSE TEST STRIPS (FREESTYLE LITE) STRIP    Use to test blood sugar twice daily and as needed for symptoms of irregular blood sugar    BLOOD PRESSURE MONITORING (BLOOD PRESS MONITOR/M-L CUFF) MISC    1 kit by Does not apply route daily. BUSPIRONE (BUSPAR) 10 MG TABLET    Take 1 tablet by mouth 3 times daily    CITALOPRAM (CELEXA) 20 MG TABLET    TAKE ONE TABLET BY MOUTH ONCE DAILY    EZETIMIBE (ZETIA) 10 MG TABLET    TAKE 1 TABLET DAILY    FAMOTIDINE (PEPCID) 40 MG TABLET    Take 1 tablet by mouth every evening    FLUTICASONE (FLONASE) 50 MCG/ACT NASAL SPRAY    1 spray by Each Nostril route daily as needed for Rhinitis or Allergies    GABAPENTIN (NEURONTIN) 300 MG CAPSULE    TAKE 1 CAPSULE FOUR TIMES A DAY    GLUCOSE MONITORING KIT (FREESTYLE) MONITORING KIT    Supply glucometer, lancets and testing strips best covered by insurance    LANCETS MISC    Test glucose 3 times daily and as needed    LEVOTHYROXINE (SYNTHROID) 88 MCG TABLET    TAKE 1 TABLET DAILY    MELOXICAM (MOBIC) 15 MG TABLET    Take 1 tablet by mouth daily    MIDODRINE (PROAMATINE) 2.5 MG TABLET    TAKE 1 TABLET THREE TIMES A DAY    MISC NATURAL PRODUCTS PO    Take 1 tablet by mouth daily as needed (gas) (Herbal Bean and Vegetable Gas Relief)    OMEPRAZOLE (PRILOSEC) 40 MG DELAYED RELEASE CAPSULE    Take 1 capsule by mouth daily    OXYBUTYNIN (DITROPAN XL) 10 MG EXTENDED RELEASE TABLET    Take 1 tablet by mouth daily    SIMVASTATIN (ZOCOR) 80 MG TABLET    TAKE 1 TABLET DAILY AT BEDTIME    TEMAZEPAM (RESTORIL) 15 MG CAPSULE    TAKE ONE CAPSULE BY MOUTH AT BEDTIME    TOPIRAMATE (TOPAMAX) 50 MG TABLET    Take 1 tablet by mouth 2 times daily    TRAMADOL (ULTRAM) 50 MG TABLET    Take 50 mg by mouth every 6 hours as needed for Pain (migraine).      TRAZODONE (DESYREL) 100 MG TABLET    Take 100 mg by mouth nightly       ALLERGIES     is allergic to pcn [penicillins]. FAMILY HISTORY     He indicated that his mother is . He indicated that his father is . family history includes Cancer in his mother; Heart Disease in his father; High Cholesterol in his father; Migraines in his mother; Thyroid Cancer in his mother. SOCIAL HISTORY      reports that he quit smoking about 23 years ago. He has a 60.00 pack-year smoking history. He has never used smokeless tobacco. He reports previous alcohol use. He reports that he does not use drugs. PHYSICAL EXAM    (up to 7 for level 4, 8 or more for level 5)   INITIAL VITALS:  height is 5' 11\" (1.803 m) and weight is 90.7 kg (200 lb). His oral temperature is 97.9 °F (36.6 °C). His blood pressure is 133/79 and his pulse is 65. His respiration is 15 and oxygen saturation is 96%. Physical Exam  Vitals signs and nursing note reviewed. Constitutional:       Appearance: Normal appearance. HENT:      Head: Normocephalic and atraumatic. Right Ear: Tympanic membrane normal.      Left Ear: Tympanic membrane normal.   Eyes:      Extraocular Movements: Extraocular movements intact. Conjunctiva/sclera: Conjunctivae normal.      Pupils: Pupils are equal, round, and reactive to light. Neck:      Comments: Mild posterior cervical area discomfort  Cardiovascular:      Rate and Rhythm: Normal rate and regular rhythm. Pulses: Normal pulses. Heart sounds: Normal heart sounds. Pulmonary:      Effort: Pulmonary effort is normal. No respiratory distress. Breath sounds: Normal breath sounds. No stridor. No wheezing, rhonchi or rales. Chest:      Chest wall: No tenderness. Abdominal:      General: Bowel sounds are normal. There is no distension. Palpations: Abdomen is soft. There is no mass. Tenderness: There is no abdominal tenderness. There is no right CVA tenderness, left CVA tenderness, guarding or rebound. Musculoskeletal: Normal range of motion. malalignment within the cervical   spine. 3. Probable C7 vertebral body hemangioma. 4. Bilateral carotid vascular calcifications. 5. Moderate emphysema at the lung apices. Narrative:    EXAMINATION:   CT OF THE HEAD WITHOUT CONTRAST; CT OF THE CERVICAL SPINE WITHOUT CONTRAST   3/7/2021 1:32 pm     TECHNIQUE:   CT of the head was performed without the administration of intravenous   contrast. Dose modulation, iterative reconstruction, and/or weight based   adjustment of the mA/kV was utilized to reduce the radiation dose to as low   as reasonably achievable.; CT of the cervical spine was performed without the   administration of intravenous contrast. Multiplanar reformatted images are   provided for review. Dose modulation, iterative reconstruction, and/or weight   based adjustment of the mA/kV was utilized to reduce the radiation dose to as   low as reasonably achievable. COMPARISON:   CT head from 01/29/2021, CT of the cervical spine from 08/28/2008. HISTORY:   ORDERING SYSTEM PROVIDED HISTORY: syncope / fall   TECHNOLOGIST PROVIDED HISTORY:     syncope / fall   Decision Support Exception->Emergency Medical Condition (MA)   Reason for Exam: syncopal episodes   Acuity: Acute   Type of Exam: Initial   Mechanism of Injury: unwitnessed fall; ORDERING SYSTEM PROVIDED HISTORY: fall   TECHNOLOGIST PROVIDED HISTORY:   fall   Decision Support Exception->Emergency Medical Condition (MA)   Reason for Exam: syncopal episodes   Acuity: Acute   Type of Exam: Initial   Mechanism of Injury: unwitnessed fall     27-year-old male with syncope/fall.      FINDINGS:   CT head:     BRAIN/VENTRICLES: Mild diffuse prominence of the sulci, cisterns, and   extra-axial spaces.  Atherosclerotic calcification of the bilateral   parasellar carotid arteries.  The ventricles are normal in size and midline   in position.  No abnormal extra-axial fluid collections.  No clear evidence   for acute intracranial hemorrhage, mass, mass effect, or midline shift. Mild-to-moderate chronic small vessel ischemic white matter disease. ORBITS: The visualized portion of the orbits demonstrate no acute abnormality. SINUSES: The visualized paranasal sinuses and mastoid air cells demonstrate   no acute abnormality. SOFT TISSUES/SKULL: No acute abnormality of the visualized skull or soft   tissues. CT of the cervical spine:     BONES/ALIGNMENT: The cervical spine is imaged from the skull base to the mid   T2 vertebral body level.  Gross preservation of the vertebral body heights   and intervertebral disc spaces.  Alignment well maintained.  Odontoid appears   intact.  The lateral masses are in appearance.  Occipital condyles articulate   properly with the lateral masses.  Axial images demonstrate no clear evidence   for acute fracture within the cervical spine. DEGENERATIVE CHANGES: Moderate degenerative changes of the atlantodental and   craniocervical junction.  Moderate multilevel hypertrophic osteophyte spur   formation anteriorly. Lucencies within the C7 vertebral body favored to represent vertebral body   hemangioma.  Mild multilevel facet arthrosis. SOFT TISSUES: There is no prevertebral soft tissue swelling. Bilateral carotid vascular calcifications.  Heterogeneous enlargement of the   thyroid gland. Moderate emphysema at the lung apices.                 Preliminary result by Med Rossi MD (03/07/21 14:31:36)                Impression:    CT head:     1. Mild atrophy.  Mild-to-moderate chronic small vessel ischemic white matter   disease.  Atherosclerotic calcification of the vasculature.  If there is   clinical concern for acute on chronic ischemia, an MRI with   diffusion-weighted imaging would be a more sensitive exam assuming there are   no contraindications to MRI. 2. No acute intracranial hemorrhage or midline shift. CT cervical spine:     1.  Mild-to-moderate degenerative changes within the cervical spine. 2. No acute vertebral body height loss or malalignment within the cervical   spine. 3. Probable C7 vertebral body hemangioma. 4. Bilateral carotid vascular calcifications. 5. Moderate emphysema at the lung apices.                     CT CERVICAL SPINE WO CONTRAST (Preliminary result)  Result time 03/07/21 14:44:40  Preliminary result by Jonelle Aaron MD (03/07/21 14:44:40)                Impression:    CT head:     1. Mild atrophy.  Mild-to-moderate chronic small vessel ischemic white matter   disease.  Atherosclerotic calcification of the vasculature.  If there is   clinical concern for acute on chronic ischemia, an MRI with   diffusion-weighted imaging would be a more sensitive exam assuming there are   no contraindications to MRI. 2. No acute intracranial hemorrhage or midline shift. CT of the cervical spine:     1. Mild-to-moderate degenerative changes within the cervical spine. 2. No acute vertebral body height loss or malalignment within the cervical   spine. 3. Probable C7 vertebral body hemangioma. 4. Bilateral carotid vascular calcifications. 5. Moderate emphysema at the lung apices. Narrative:    EXAMINATION:   CT OF THE HEAD WITHOUT CONTRAST; CT OF THE CERVICAL SPINE WITHOUT CONTRAST   3/7/2021 1:32 pm     TECHNIQUE:   CT of the head was performed without the administration of intravenous   contrast. Dose modulation, iterative reconstruction, and/or weight based   adjustment of the mA/kV was utilized to reduce the radiation dose to as low   as reasonably achievable.; CT of the cervical spine was performed without the   administration of intravenous contrast. Multiplanar reformatted images are   provided for review. Dose modulation, iterative reconstruction, and/or weight   based adjustment of the mA/kV was utilized to reduce the radiation dose to as   low as reasonably achievable.      COMPARISON:   CT head from 01/29/2021, CT of the cervical spine from 08/28/2008. HISTORY:   ORDERING SYSTEM PROVIDED HISTORY: syncope / fall   TECHNOLOGIST PROVIDED HISTORY:     syncope / fall   Decision Support Exception->Emergency Medical Condition (MA)   Reason for Exam: syncopal episodes   Acuity: Acute   Type of Exam: Initial   Mechanism of Injury: unwitnessed fall; ORDERING SYSTEM PROVIDED HISTORY: fall   TECHNOLOGIST PROVIDED HISTORY:   fall   Decision Support Exception->Emergency Medical Condition (MA)   Reason for Exam: syncopal episodes   Acuity: Acute   Type of Exam: Initial   Mechanism of Injury: unwitnessed fall     66-year-old male with syncope/fall. FINDINGS:   CT head:     BRAIN/VENTRICLES: Mild diffuse prominence of the sulci, cisterns, and   extra-axial spaces.  Atherosclerotic calcification of the bilateral   parasellar carotid arteries.  The ventricles are normal in size and midline   in position.  No abnormal extra-axial fluid collections.  No clear evidence   for acute intracranial hemorrhage, mass, mass effect, or midline shift. Mild-to-moderate chronic small vessel ischemic white matter disease. ORBITS: The visualized portion of the orbits demonstrate no acute abnormality. SINUSES: The visualized paranasal sinuses and mastoid air cells demonstrate   no acute abnormality. SOFT TISSUES/SKULL: No acute abnormality of the visualized skull or soft   tissues. CT of the cervical spine:     BONES/ALIGNMENT: The cervical spine is imaged from the skull base to the mid   T2 vertebral body level.  Gross preservation of the vertebral body heights   and intervertebral disc spaces.  Alignment well maintained.  Odontoid appears   intact.  The lateral masses are in appearance.  Occipital condyles articulate   properly with the lateral masses.  Axial images demonstrate no clear evidence   for acute fracture within the cervical spine.      DEGENERATIVE CHANGES: Moderate degenerative changes of the atlantodental and   craniocervical junction.  Moderate multilevel hypertrophic osteophyte spur   formation anteriorly. Lucencies within the C7 vertebral body favored to represent vertebral body   hemangioma.  Mild multilevel facet arthrosis. SOFT TISSUES: There is no prevertebral soft tissue swelling. Bilateral carotid vascular calcifications.  Heterogeneous enlargement of the   thyroid gland. Moderate emphysema at the lung apices.                 Preliminary result by Shanika Morales MD (03/07/21 14:31:36)                Impression:    CT head:     1. Mild atrophy.  Mild-to-moderate chronic small vessel ischemic white matter   disease.  Atherosclerotic calcification of the vasculature.  If there is   clinical concern for acute on chronic ischemia, an MRI with   diffusion-weighted imaging would be a more sensitive exam assuming there are   no contraindications to MRI. 2. No acute intracranial hemorrhage or midline shift. CT cervical spine:     1. Mild-to-moderate degenerative changes within the cervical spine. 2. No acute vertebral body height loss or malalignment within the cervical   spine. 3. Probable C7 vertebral body hemangioma. 4. Bilateral carotid vascular calcifications. 5. Moderate emphysema at the lung apices. Interpretation per the Radiologist below, if available at the time of this note:    CT CHEST PULMONARY EMBOLISM W CONTRAST (Final result)  Result time 03/07/21 15:09:27  Final result by Gustavo Castro MD (03/07/21 15:09:27)                Impression:    No evidence of pulmonary embolism or acute thoracic aortic abnormality. Mild emphysema.  Minimal bibasilar atelectasis.  Otherwise, clear lungs. Cardiomegaly.  No pleural or pericardial effusion. Narrative:    EXAMINATION:   CTA OF THE CHEST 3/7/2021 1:39 pm     TECHNIQUE:   CTA of the chest was performed after the administration of intravenous   contrast.  Multiplanar reformatted images are provided for review.   MIP   images are provided for review. Dose modulation, iterative reconstruction,   and/or weight based adjustment of the mA/kV was utilized to reduce the   radiation dose to as low as reasonably achievable. COMPARISON:   None. HISTORY:   ORDERING SYSTEM PROVIDED HISTORY: syncope / elevated d-dimer   TECHNOLOGIST PROVIDED HISTORY:   syncope / elevated d-dimer   Decision Support Exception->Emergency Medical Condition (MA)   Reason for Exam: syncope, elevated d-dimer   Acuity: Acute   Type of Exam: Initial     FINDINGS:   Pulmonary Arteries: Pulmonary arteries are adequately opacified for   evaluation. No evidence of intraluminal filling defect to suggest pulmonary   embolism.  Main pulmonary artery is normal in caliber. Mediastinum: The heart is enlarged.  The thoracic aorta and proximal great   vessels are patent and of normal caliber. No pericardial effusion. No   enlarged or suspicious axillary, hilar, or mediastinal lymphadenopathy. Lungs/pleura: The trachea and mainstem bronchi are widely patent.  Mild   centrilobular emphysema.  Minimal atelectasis is present at both lung bases. The lungs are otherwise clear.  No discrete pulmonary nodule or mass. No   pleural effusion or pneumothorax. Soft Tissues/Bones: Degenerative changes are present throughout the thoracic   spine. Upper Abdomen: The visualized upper abdomen is unremarkable. LABS:  Results for orders placed or performed during the hospital encounter of 03/07/21   COVID-19, Rapid    Specimen: Nasopharyngeal Swab   Result Value Ref Range    Specimen Description . NASOPHARYNGEAL SWAB     SARS-CoV-2, Rapid Not Detected Not Detected   Basic Metabolic Panel   Result Value Ref Range    Glucose 128 (H) 70 - 99 mg/dL    BUN 21 8 - 23 mg/dL    CREATININE 1.15 0.70 - 1.20 mg/dL    Bun/Cre Ratio NOT REPORTED 9 - 20    Calcium 9.3 8.6 - 10.4 mg/dL    Sodium 141 135 - 144 mmol/L    Potassium 4.5 3.7 - 5.3 mmol/L    Chloride 105 98 - 107 mmol/L    CO2 26 20 - 31 mmol/L    Anion Gap 10 9 - 17 mmol/L    GFR Non-African American >60 >60 mL/min    GFR African American >60 >60 mL/min    GFR Comment          GFR Staging NOT REPORTED    CBC Auto Differential   Result Value Ref Range    WBC 6.8 3.5 - 11.0 k/uL    RBC 5.02 4.5 - 5.9 m/uL    Hemoglobin 15.5 13.5 - 17.5 g/dL    Hematocrit 46.8 41 - 53 %    MCV 93.3 80 - 100 fL    MCH 31.0 26 - 34 pg    MCHC 33.2 31 - 37 g/dL    RDW 13.3 12.5 - 15.4 %    Platelets 559 (L) 180 - 450 k/uL    MPV 10.1 6.0 - 12.0 fL    NRBC Automated NOT REPORTED per 100 WBC    Differential Type NOT REPORTED     Seg Neutrophils 70 (H) 36 - 66 %    Lymphocytes 21 (L) 24 - 44 %    Monocytes 6 2 - 11 %    Eosinophils % 2 1 - 4 %    Basophils 1 0 - 2 %    Immature Granulocytes NOT REPORTED 0 %    Segs Absolute 4.80 1.8 - 7.7 k/uL    Absolute Lymph # 1.40 1.0 - 4.8 k/uL    Absolute Mono # 0.40 0.1 - 1.2 k/uL    Absolute Eos # 0.10 0.0 - 0.4 k/uL    Basophils Absolute 0.10 0.0 - 0.2 k/uL    Absolute Immature Granulocyte NOT REPORTED 0.00 - 0.30 k/uL    WBC Morphology NOT REPORTED     RBC Morphology NOT REPORTED     Platelet Estimate NOT REPORTED    Hepatic Function Panel   Result Value Ref Range    Albumin 4.5 3.5 - 5.2 g/dL    Alkaline Phosphatase 80 40 - 129 U/L    ALT 12 5 - 41 U/L    AST 12 <40 U/L    Total Bilirubin 0.68 0.3 - 1.2 mg/dL    Bilirubin, Direct 0.20 <0.31 mg/dL    Bilirubin, Indirect 0.48 0.00 - 1.00 mg/dL    Total Protein 7.1 6.4 - 8.3 g/dL    Globulin NOT REPORTED 1.5 - 3.8 g/dL    Albumin/Globulin Ratio 1.7 1.0 - 2.5   Troponin   Result Value Ref Range    Troponin, High Sensitivity 13 0 - 22 ng/L    Troponin T NOT REPORTED <0.03 ng/mL    Troponin Interp NOT REPORTED    Urinalysis Reflex to Culture   Result Value Ref Range    Color, UA YELLOW YELLOW    Turbidity UA CLEAR CLEAR    Glucose, Ur NEGATIVE NEGATIVE    Bilirubin Urine NEGATIVE NEGATIVE    Ketones, Urine NEGATIVE NEGATIVE    Specific Gravity, UA 1.010 1.005 - 1.030    Urine Hgb NEGATIVE NEGATIVE    pH, UA 5.5 5.0 - 8.0    Protein, UA NEGATIVE NEGATIVE    Urobilinogen, Urine Normal Normal    Nitrite, Urine NEGATIVE NEGATIVE    Leukocyte Esterase, Urine NEGATIVE NEGATIVE    Urinalysis Comments       Microscopic exam not performed based on chemical results unless requested in original order. Urinalysis Comments          Urinalysis Comments       Utilizing a urinalysis as the only screening method to exclude a potential uropathogen can be unreliable in many patient populations. Rapid screening tests are less sensitive than culture and if UTI is a clinical possibility, culture should be considered despite a negative urinalysis. D-Dimer, Quantitative   Result Value Ref Range    D-Dimer, Quant 5.11 mg/L FEU           EMERGENCY DEPARTMENT COURSE:   Vitals:    Vitals:    03/07/21 1400 03/07/21 1415 03/07/21 1430 03/07/21 1500   BP: 130/72 (!) 145/75 135/88 133/79   Pulse: 65 58 70 65   Resp: 13 17 13 15   Temp:       TempSrc:       SpO2: 97% 97% 97% 96%   Weight:       Height:         -------------------------  BP: 133/79, Temp: 97.9 °F (36.6 °C), Pulse: 65, Resp: 15      RE-EVALUATION:  Patient presents with syncopal episodes work-up here did reveal an elevated D-dimer of which a CT of the chest showed no acute process imaging of the head and neck also showing no acute process lab work is otherwise essentially unremarkable the patient is neurovascularly intact he presents with syncopal episodes he describes no postictal.  After the syncopal episode states that he has passed out several times given this I do feel he warrants admission to hospital setting the patient is agreeable to this plan so I will contact my hospitalist for admission    CONSULTS:  My hospitalist is kind left admit the patient to her service    PROCEDURES:  None    FINAL IMPRESSION      1.  Syncope and collapse          DISPOSITION/PLAN   DISPOSITION        CONDITION ON DISPOSITION:   Stable    PATIENT REFERRED TO:  No follow-up provider specified. DISCHARGE MEDICATIONS:  New Prescriptions    No medications on file       (Please note that portions of this note were completed with a voicerecognition program.  Efforts were made to edit the dictations but occasionally words are mis-transcribed.)    Augustina Closs,, MD, F.A.C.E.P.   Attending Emergency Medicine Physician       Augustina Closs, MD  03/07/21 [id number]

## 2021-03-07 NOTE — ED NOTES
Urinal provided- advised of need for sample- call light in reach when sample provided     Jamila Hinkle, CAMILO  03/07/21 [id number]

## 2021-03-07 NOTE — ED NOTES
Report given to CAMILO Edge from ms. Report method by phone   The following was reviewed with receiving RN:   Current vital signs:  /79   Pulse 65   Temp 97.9 °F (36.6 °C) (Oral)   Resp 15   Ht 5' 11\" (1.803 m)   Wt 90.7 kg (200 lb)   SpO2 96%   BMI 27.89 kg/m²                MEWS Score: 0     Any medication or safety alerts were reviewed. Any pending diagnostics and notifications were also reviewed, as well as any safety concerns or issues, abnormal labs, abnormal imaging, and abnormal assessment findings. Questions were answered.           Hollie Petit RN  03/07/21 [id number]

## 2021-03-07 NOTE — TELEPHONE ENCOUNTER
Wife calling concerned because  was confused and had fallen twice this morning. Wife states that he fell and hit his head on the cement floor. Wife states he is very confused and asking questions about things that have happened 20 years ago. Wife states patient did not know where he was was this morning and she is concerned. Wife states the fall has just happened about 40 minutes ago and  is refusing to go to the hospital. RN (writer) asked to speak to pateint and wife put phone on speak so RN (writer) was chantelle to talk to patient. Patient was able to answer questions. Patient knew where he was, what day of the week it is, and who is the current president. Patient states he felt weak and dizzy this morning and his \"vision went black\" where he fainted twice and fell on the floor. The first time he fainted was in his bathroom and he hit the front of his head on a cabinet before he went to the floor. Wife states she can see an indentation on the front of his head. The second time the patient blacked out he fell backwards and hit his head of the floor. Wife states there is a large bump on the back of his head. Patient states his last blood sugar was 119 and he denies any recent changes to medication. His wife expressed concern that he needs a walker and a shower chair to help assist him. Patient is currently laying down and still feels weak, but is more alert and does not seem as confused as he previously was. Patient and wife advised per guidelines to go to the emergency room for further evaluation. Wife agreeable to advice. Patient now agreeable to advice.  Wife states she can drive him to [facility].             Reason for Disposition   Fainted 2 times in one day    Protocols used: [facility]

## 2021-03-08 ENCOUNTER — TELEPHONE (OUTPATIENT)
Dept: PRIMARY CARE CLINIC | Age: 71
End: 2021-03-08

## 2021-03-08 PROBLEM — F43.10 POST TRAUMATIC STRESS DISORDER: Status: ACTIVE | Noted: 2021-03-08

## 2021-03-08 PROBLEM — F32.A DEPRESSIVE DISORDER: Status: ACTIVE | Noted: 2021-03-08

## 2021-03-08 PROBLEM — I10 BENIGN HYPERTENSION: Status: ACTIVE | Noted: 2021-03-08

## 2021-03-08 LAB
ANION GAP SERPL CALCULATED.3IONS-SCNC: 5 MMOL/L (ref 9–17)
BUN BLDV-MCNC: 21 MG/DL (ref 8–23)
BUN/CREAT BLD: ABNORMAL (ref 9–20)
CALCIUM SERPL-MCNC: 8.5 MG/DL (ref 8.6–10.4)
CHLORIDE BLD-SCNC: 109 MMOL/L (ref 98–107)
CO2: 25 MMOL/L (ref 20–31)
CREAT SERPL-MCNC: 1.08 MG/DL (ref 0.7–1.2)
ESTIMATED AVERAGE GLUCOSE: 126 MG/DL
GFR AFRICAN AMERICAN: >60 ML/MIN
GFR NON-AFRICAN AMERICAN: >60 ML/MIN
GFR SERPL CREATININE-BSD FRML MDRD: ABNORMAL ML/MIN/{1.73_M2}
GFR SERPL CREATININE-BSD FRML MDRD: ABNORMAL ML/MIN/{1.73_M2}
GLUCOSE BLD-MCNC: 106 MG/DL (ref 75–110)
GLUCOSE BLD-MCNC: 116 MG/DL (ref 70–99)
GLUCOSE BLD-MCNC: 122 MG/DL (ref 75–110)
HBA1C MFR BLD: 6 % (ref 4–6)
HCT VFR BLD CALC: 39.9 % (ref 41–53)
HEMOGLOBIN: 13.3 G/DL (ref 13.5–17.5)
LV EF: 60 %
LVEF MODALITY: NORMAL
MAGNESIUM: 2 MG/DL (ref 1.6–2.6)
MCH RBC QN AUTO: 30.9 PG (ref 26–34)
MCHC RBC AUTO-ENTMCNC: 33.2 G/DL (ref 31–37)
MCV RBC AUTO: 92.8 FL (ref 80–100)
NRBC AUTOMATED: ABNORMAL PER 100 WBC
PDW BLD-RTO: 13.5 % (ref 12.5–15.4)
PLATELET # BLD: 116 K/UL (ref 140–450)
PMV BLD AUTO: 10.4 FL (ref 6–12)
POTASSIUM SERPL-SCNC: 4.3 MMOL/L (ref 3.7–5.3)
RBC # BLD: 4.3 M/UL (ref 4.5–5.9)
SODIUM BLD-SCNC: 139 MMOL/L (ref 135–144)
WBC # BLD: 6.2 K/UL (ref 3.5–11)

## 2021-03-08 PROCEDURE — G0378 HOSPITAL OBSERVATION PER HR: HCPCS

## 2021-03-08 PROCEDURE — 97166 OT EVAL MOD COMPLEX 45 MIN: CPT

## 2021-03-08 PROCEDURE — 97161 PT EVAL LOW COMPLEX 20 MIN: CPT

## 2021-03-08 PROCEDURE — 97535 SELF CARE MNGMENT TRAINING: CPT

## 2021-03-08 PROCEDURE — 2580000003 HC RX 258: Performed by: NURSE PRACTITIONER

## 2021-03-08 PROCEDURE — 96361 HYDRATE IV INFUSION ADD-ON: CPT

## 2021-03-08 PROCEDURE — 6370000000 HC RX 637 (ALT 250 FOR IP): Performed by: NURSE PRACTITIONER

## 2021-03-08 PROCEDURE — 83036 HEMOGLOBIN GLYCOSYLATED A1C: CPT

## 2021-03-08 PROCEDURE — 82947 ASSAY GLUCOSE BLOOD QUANT: CPT

## 2021-03-08 PROCEDURE — 83735 ASSAY OF MAGNESIUM: CPT

## 2021-03-08 PROCEDURE — 85027 COMPLETE CBC AUTOMATED: CPT

## 2021-03-08 PROCEDURE — 97116 GAIT TRAINING THERAPY: CPT

## 2021-03-08 PROCEDURE — 99219 PR INITIAL OBSERVATION CARE/DAY 50 MINUTES: CPT | Performed by: INTERNAL MEDICINE

## 2021-03-08 PROCEDURE — 80048 BASIC METABOLIC PNL TOTAL CA: CPT

## 2021-03-08 PROCEDURE — 93306 TTE W/DOPPLER COMPLETE: CPT

## 2021-03-08 PROCEDURE — 36415 COLL VENOUS BLD VENIPUNCTURE: CPT

## 2021-03-08 PROCEDURE — APPSS45 APP SPLIT SHARED TIME 31-45 MINUTES: Performed by: NURSE PRACTITIONER

## 2021-03-08 RX ORDER — NICOTINE POLACRILEX 4 MG
15 LOZENGE BUCCAL PRN
Status: DISCONTINUED | OUTPATIENT
Start: 2021-03-08 | End: 2021-03-09 | Stop reason: HOSPADM

## 2021-03-08 RX ORDER — MIDODRINE HYDROCHLORIDE 5 MG/1
10 TABLET ORAL
Status: DISCONTINUED | OUTPATIENT
Start: 2021-03-08 | End: 2021-03-08

## 2021-03-08 RX ORDER — SODIUM CHLORIDE 9 MG/ML
INJECTION, SOLUTION INTRAVENOUS CONTINUOUS
Status: DISCONTINUED | OUTPATIENT
Start: 2021-03-08 | End: 2021-03-09 | Stop reason: HOSPADM

## 2021-03-08 RX ORDER — GABAPENTIN 300 MG/1
300 CAPSULE ORAL 3 TIMES DAILY
Status: DISCONTINUED | OUTPATIENT
Start: 2021-03-08 | End: 2021-03-09 | Stop reason: HOSPADM

## 2021-03-08 RX ORDER — DEXTROSE MONOHYDRATE 50 MG/ML
100 INJECTION, SOLUTION INTRAVENOUS PRN
Status: DISCONTINUED | OUTPATIENT
Start: 2021-03-08 | End: 2021-03-09 | Stop reason: HOSPADM

## 2021-03-08 RX ORDER — CETIRIZINE HYDROCHLORIDE 10 MG/1
10 TABLET ORAL DAILY
Status: DISCONTINUED | OUTPATIENT
Start: 2021-03-08 | End: 2021-03-09 | Stop reason: HOSPADM

## 2021-03-08 RX ORDER — TRAZODONE HYDROCHLORIDE 50 MG/1
50 TABLET ORAL NIGHTLY
Status: DISCONTINUED | OUTPATIENT
Start: 2021-03-08 | End: 2021-03-09 | Stop reason: HOSPADM

## 2021-03-08 RX ORDER — DEXTROSE MONOHYDRATE 25 G/50ML
12.5 INJECTION, SOLUTION INTRAVENOUS PRN
Status: DISCONTINUED | OUTPATIENT
Start: 2021-03-08 | End: 2021-03-09 | Stop reason: HOSPADM

## 2021-03-08 RX ORDER — MIDODRINE HYDROCHLORIDE 5 MG/1
5 TABLET ORAL
Status: DISCONTINUED | OUTPATIENT
Start: 2021-03-09 | End: 2021-03-09 | Stop reason: HOSPADM

## 2021-03-08 RX ADMIN — TOPIRAMATE 50 MG: 25 TABLET, FILM COATED ORAL at 09:10

## 2021-03-08 RX ADMIN — FAMOTIDINE 40 MG: 20 TABLET, FILM COATED ORAL at 18:10

## 2021-03-08 RX ADMIN — SODIUM CHLORIDE, PRESERVATIVE FREE 10 ML: 5 INJECTION INTRAVENOUS at 09:11

## 2021-03-08 RX ADMIN — GABAPENTIN 300 MG: 300 CAPSULE ORAL at 22:11

## 2021-03-08 RX ADMIN — CITALOPRAM HYDROBROMIDE 20 MG: 20 TABLET, FILM COATED ORAL at 09:10

## 2021-03-08 RX ADMIN — MIDODRINE HYDROCHLORIDE 5 MG: 5 TABLET ORAL at 09:10

## 2021-03-08 RX ADMIN — OXYBUTYNIN CHLORIDE 10 MG: 5 TABLET, EXTENDED RELEASE ORAL at 09:08

## 2021-03-08 RX ADMIN — TRAZODONE HYDROCHLORIDE 50 MG: 50 TABLET ORAL at 22:11

## 2021-03-08 RX ADMIN — TOPIRAMATE 50 MG: 25 TABLET, FILM COATED ORAL at 22:11

## 2021-03-08 RX ADMIN — MIDODRINE HYDROCHLORIDE 10 MG: 5 TABLET ORAL at 13:18

## 2021-03-08 RX ADMIN — ATORVASTATIN CALCIUM 40 MG: 40 TABLET, FILM COATED ORAL at 09:11

## 2021-03-08 RX ADMIN — BUSPIRONE HYDROCHLORIDE 10 MG: 5 TABLET ORAL at 22:11

## 2021-03-08 RX ADMIN — GABAPENTIN 300 MG: 300 CAPSULE ORAL at 09:09

## 2021-03-08 RX ADMIN — BUSPIRONE HYDROCHLORIDE 10 MG: 5 TABLET ORAL at 09:09

## 2021-03-08 RX ADMIN — BUSPIRONE HYDROCHLORIDE 10 MG: 5 TABLET ORAL at 13:17

## 2021-03-08 RX ADMIN — PANTOPRAZOLE SODIUM 40 MG: 40 TABLET, DELAYED RELEASE ORAL at 06:03

## 2021-03-08 RX ADMIN — SODIUM CHLORIDE: 9 INJECTION, SOLUTION INTRAVENOUS at 15:08

## 2021-03-08 RX ADMIN — CETIRIZINE HYDROCHLORIDE 10 MG: 10 TABLET, FILM COATED ORAL at 13:18

## 2021-03-08 RX ADMIN — EZETIMIBE 10 MG: 10 TABLET ORAL at 22:11

## 2021-03-08 RX ADMIN — MELOXICAM 15 MG: 7.5 TABLET ORAL at 09:09

## 2021-03-08 RX ADMIN — GABAPENTIN 300 MG: 300 CAPSULE ORAL at 13:18

## 2021-03-08 RX ADMIN — LEVOTHYROXINE SODIUM 88 MCG: 88 TABLET ORAL at 06:03

## 2021-03-08 ASSESSMENT — ENCOUNTER SYMPTOMS
CHEST TIGHTNESS: 0
SHORTNESS OF BREATH: 1
COUGH: 0
GASTROINTESTINAL NEGATIVE: 1
EYES NEGATIVE: 1

## 2021-03-08 ASSESSMENT — PAIN DESCRIPTION - LOCATION
LOCATION: BACK

## 2021-03-08 ASSESSMENT — PAIN - FUNCTIONAL ASSESSMENT: PAIN_FUNCTIONAL_ASSESSMENT: ACTIVITIES ARE NOT PREVENTED

## 2021-03-08 ASSESSMENT — PAIN DESCRIPTION - PAIN TYPE
TYPE: ACUTE PAIN
TYPE: CHRONIC PAIN

## 2021-03-08 ASSESSMENT — PAIN SCALES - GENERAL
PAINLEVEL_OUTOF10: 0
PAINLEVEL_OUTOF10: 8
PAINLEVEL_OUTOF10: 8

## 2021-03-08 NOTE — TELEPHONE ENCOUNTER
Wife called again requesting shower chair and walker with wheels and brakes ([facility] told her to request these for when he is discharged)  Explained to wife, he may need OV note for insurance to explain medical necessity.

## 2021-03-08 NOTE — CARE COORDINATION
Case Management Initial Discharge Plan  Jovita Toussaint,             Met with:patient to discuss discharge plans. Information verified: address, contacts, phone number, , insurance Yes    Emergency Contact/Next of Kin name & number: margret Amos [phone]    PCP: LINUS Zhao CNP  Date of last visit: past year    Insurance Provider: [employer]    Discharge Planning    Living Arrangements:  Spouse/Significant Other   Support Systems:  Spouse/Significant Other    Home has 1 stories - mobile home  3 stairs to climb to get into front door, stairs to climb to reach second floor  Location of bedroom/bathroom in home     Patient able to perform ADL's:Independent    Current Services (outpatient & in home) none  DME equipment:   DME provider:     Receiving oral anticoagulation therapy? No    If indicated:   Physician managing anticoagulation treatment:   Where does patient obtain lab work for ATC treatment? Potential Assistance Needed:  N/A    Patient agreeable to home care: No  [place] of choice provided:  no    Prior SNF/Rehab Placement and Facility: no  Agreeable to SNF/Rehab: No  [place] of choice provided: no     Evaluation: no    Expected Discharge date:  21    Patient expects to be discharged to:  home  Follow Up Appointment: Best Day/ Time:      Transportation provider: spouse  Transportation arrangements needed for discharge: No    Readmission Risk              Risk of Unplanned Readmission:        0             Does patient have a readmission risk score greater than 14?: No  If yes, follow-up appointment must be made within 7 days of discharge.      Goals of Care:  Safety - find out why he's having syncope      Discharge Plan: home with spouse, independent          Electronically signed by Delaney Waters RN on 3/8/21 at 10:38 AM EST

## 2021-03-08 NOTE — PROGRESS NOTES
Occupational Therapy   Occupational Therapy Initial Assessment  Date: 3/8/2021   Patient Name: Tanner Fournier  MRN: [id number]     : 1950    Date of Service: 3/8/2021  Chief Complaint   Patient presents with    Fall     x2 today and thinks he \"blacks out\"       Discharge Recommendations:    No further occupational therapy recommended at discharge. Assessment   Performance deficits / Impairments: Decreased functional mobility ; Decreased safe awareness;Decreased balance;Decreased ADL status; Decreased high-level IADLs;Decreased endurance  Assessment: RN ok'd for eval - pt pleasant and agreeable. Pt would continue to benefit from skilled OT services during current stay to increase endurance and independence to perform ADLs and functional mobility/transfers. Pt reports spouse and neighbor are available if needed. Pt requires 24 hr assistance at this time for all functional activities. OT Equipment: Shower chair with back  Prognosis: Good  Decision Making: Medium Complexity  OT Education: OT Role;Plan of Care;Transfer Training;Precautions; Energy Conservation  Patient Education: Educated on purpose/benefit and donning of [employer]. REQUIRES OT FOLLOW UP: Yes  Activity Tolerance  Activity Tolerance: Patient Tolerated treatment well  Activity Tolerance: No reports of dizziness upon/during activity engagement  Safety Devices  Safety Devices in place: Yes  Type of devices: Call light within reach; Chair alarm in place; Left in chair;Nurse notified  Restraints  Initially in place: No           Patient Diagnosis(es): The encounter diagnosis was Syncope and collapse.     has a past medical history of Anxiety, Depression, Diabetes mellitus ([address] Ear infection, Headache(784.0), Hyperlipidemia, Insomnia, Osteoarthritis, PTSD (post-traumatic stress disorder), and Unspecified sleep apnea. has a past surgical history that includes Tonsillectomy and adenoidectomy and Foot surgery (Right). Restrictions  Restrictions/Precautions  Restrictions/Precautions: Fall Risk  Required Braces or Orthoses?: No  Position Activity Restriction  Other position/activity restrictions: up with assistance    Subjective   General  Patient assessed for rehabilitation services?: Yes  Family / Caregiver Present: No  Subjective  Subjective: RN ok'd for carlito.  Patient Currently in Pain: Yes  Pain Assessment  Pain Assessment: 0-10  Pain Level: 8  Pain Type: Chronic pain  Pain Location: Back  Pain Orientation: Lower  Functional Pain Assessment: Activities are not prevented  Non-Pharmaceutical Pain Intervention(s): Ambulation/Increased Activity;Repositioned;Distraction; Emotional support  Response to Pain Intervention: Patient Satisfied      Social/Functional History  Social/Functional History  Lives With: Spouse  Type of Home: Mobile home  Home Layout: One level  Home Access: Stairs to enter with rails  Entrance Stairs - Number of Steps: 4  Entrance Stairs - Rails: Both  Bathroom Shower/Tub: Walk-in shower  Bathroom Toilet: Standard  Bathroom Equipment: Grab bars in shower(pt reports need for shower chair)  Home Equipment: 4 wheeled walker, Rolling walker, [employer] Help From: Family, Neighbor  ADL Assistance: Independent  Homemaking Assistance: Independent(Pt reports wife performs most household responsibilities)  Homemaking Responsibilities: Yes  Ambulation Assistance: Independent(use of cane inconsistently)  Transfer Assistance: Independent  Active : Yes  Patient's  Info: Pt reports has ceased driving over past 3 weeks. Mode of Transportation: Car, Motorcycle  Occupation: Retired  Type of occupation: [employer]: \"R[employer]\", going to dinner and movies with SO  Additional Comments: Pt report spouse available to help if  needed.        Objective   Vision: Impaired  Vision Exceptions: Wears glasses at all times    Hearing: Exceptions to [facility]  Hearing Exceptions: No hearing aid(Pt reports L - partially deaf; R - nearly completely deaf)      Orientation  Overall Orientation Status: Within Functional Limits    Balance  Sitting Balance: Stand by assistance(EOB with SBA to perform functional ROM/MMT screen)  Standing Balance: Contact guard assistance(Pt stood ~5 minutes CGA at sink for denture care and rinse with mouthwash; no evidence of LOB)    Functional Mobility  Functional - Mobility Device: Rolling Walker  Activity: (Bed > dalal around unit w/RW SBA > inside room to bathroom w/o device CGA > chair CGA)    ADL  Feeding: Independent  Grooming: Increased time to complete;Contact guard assistance(for denture care and rinse with mouthwash)  UE Bathing: Increased time to complete;Supervision  LE Bathing: Increased time to complete;Supervision  UE Dressing: Increased time to complete;Supervision  LE Dressing: Increased time to complete;Minimal assistance(IND don B socks - Min A don B guerita hose)  Toileting: Increased time to complete;Supervision     Tone RUE  RUE Tone: Normotonic  Tone LUE  LUE Tone: Normotonic  Coordination  Movements Are Fluid And Coordinated: Yes     Bed mobility  Supine to Sit: Stand by assistance  Sit to Supine: (Pt up in chair at end of session)     Transfers  Sit to stand: Stand by assistance  Stand to sit: Stand by assistance  Transfer Comments: Use of RW     Cognition  Overall Cognitive Status: Exceptions  Arousal/Alertness: Appropriate responses to stimuli  Following Commands:  Follows all commands without difficulty  Attention Span: Attends with cues to redirect  Safety Judgement: Decreased awareness of need for assistance        Sensation  Overall Sensation Status: WFL(Pt denies numbness or tingling)        LUE AROM (degrees)  LUE AROM : WFL  RUE AROM (degrees)  RUE AROM : WFL  LUE Strength  Gross LUE Strength: WFL  LUE Strength Comment: Grossly 4/5  RUE Strength  Gross RUE Strength: WFL  RUE Strength Comment: Grossly 4/5             Plan   Plan  Times per week: 5-6x/week  Times per day: Daily  Current Treatment Recommendations: Safety Education & Training, Balance Training, Patient/Caregiver Education & Training, Self-Care / ADL, Home Management Training, Functional Mobility Training, Endurance Training, Equipment Evaluation, Education, & procurement      AM-PAC Score  AM-PAC Inpatient Daily Activity Raw Score: 19 (03/08/21 1353)  AM-PAC Inpatient ADL T-Scale Score : 40.22 (03/08/21 1353)  ADL Inpatient CMS 0-100% Score: 42.8 (03/08/21 1353)  ADL Inpatient CMS G-Code Modifier : CK (03/08/21 [facility])    Goals  Short term goals  Time Frame for Short term goals: 14 visits  Short term goal 1: Pt will IND demo good safety awareness to perform ADLs and functional mobility/transfers. Short term goal 2: Pt will demo Mod I, with DME/AE as needed, to perform ADLs. Short term goal 3: Pt will demo Mod I, with least restrictive AD, to perform functional mobility/transfers  Short term goal 4: Pt will tolerate 15+ minutes of dynamic standing balance during functional task for increased engagement in ADLs.        Therapy Time   Individual Concurrent Group Co-treatment   Time In 2175         Time Out 1216         Minutes 45         Timed Code Treatment Minutes: [address] [facility], OTR/L

## 2021-03-08 NOTE — TELEPHONE ENCOUNTER
Wife called wanting you to know that he fell twice in the bathroom, hit front of head then 2nd fall, hit back of head. She took him to [facility]. She states they are doing lots of testing and states the only thing they can find is that it may be his meds causing the falls. He has lost weight in past couple months. She states she did not realize he is on 18 medications and at next visit would like you to review meds. Wife called back back within minutes asking if he should be taking Metformin 500 mg bottle is dated 11/23/2020. Writer does not see this on med list.  Wife states he had a fit when she was going to throw away-so she thinks maybe he was taking it.

## 2021-03-08 NOTE — TELEPHONE ENCOUNTER
It looks like he has an appointment tomorrow with neurology. Please be sure that they are keeping that appointment. Encouraged him to discuss medications with a neurologist and to bring all of his current medication bottles and medication list with them. If after that appointment they still have questions/concerns he will need to be put on my schedule as soon as possible.   Thanks

## 2021-03-08 NOTE — TELEPHONE ENCOUNTER
They should be able to receive orders and/or will be sent home with the walker from hospital  She needs to speaker with discharge planner there and they should arrange home care for them

## 2021-03-08 NOTE — PLAN OF CARE
Problem: Falls - Risk of:  Goal: Will remain free from falls  Description: Will remain free from falls  Outcome: Ongoing  Goal: Absence of physical injury  Description: Absence of physical injury  Outcome: Ongoing     Problem: Infection:  Goal: Will remain free from infection  Description: Will remain free from infection  Outcome: Ongoing     Problem: Safety:  Goal: Free from accidental physical injury  Description: Free from accidental physical injury  Outcome: Ongoing  Goal: Free from intentional harm  Description: Free from intentional harm  Outcome: Ongoing     Problem: Daily Care:  Goal: Daily care needs are met  Description: Daily care needs are met  Outcome: Ongoing     Problem: Pain:  Goal: Patient's pain/discomfort is manageable  Description: Patient's pain/discomfort is manageable  Outcome: Ongoing  Goal: Pain level will decrease  Description: Pain level will decrease  Outcome: Ongoing  Goal: Control of acute pain  Description: Control of acute pain  Outcome: Ongoing  Goal: Control of chronic pain  Description: Control of chronic pain  Outcome: Ongoing     Problem: Skin Integrity:  Goal: Skin integrity will stabilize  Description: Skin integrity will stabilize  Outcome: Ongoing     Problem: Discharge Planning:  Goal: Patients continuum of care needs are met  Description: Patients continuum of care needs are met  Outcome: Ongoing     Problem: Musculor/Skeletal Functional Status  Goal: Highest potential functional level  Outcome: Ongoing  Goal: Absence of falls  Outcome: Ongoing

## 2021-03-08 NOTE — PLAN OF CARE
Problem: Falls - Risk of:  Goal: Will remain free from falls  Description: Will remain free from falls  Outcome: Ongoing   No falls/injuries this shift, bed in lowest position, brakes on, bed alarm on, call light in reach, side rails up x2   Problem: Infection:  Goal: Will remain free from infection  Description: Will remain free from infection  Outcome: Ongoing   No skin breakdown noted, no signs/symptoms of infection, continue to monitor labwork including WBC, medications ordered   Problem: Daily Care:  Goal: Daily care needs are met  Description: Daily care needs are met  Outcome: Ongoing   Pt.  Actively participates in plan of care  Problem: Discharge Planning:  Goal: Patients continuum of care needs are met  Description: Patients continuum of care needs are met  Outcome: Ongoing

## 2021-03-08 NOTE — TELEPHONE ENCOUNTER
Wife will check with dischg planner regarding DME orders, and bring all meds to neuro appt and discuss.

## 2021-03-08 NOTE — H&P
[facility]  Office: [address], DO, Akiko Chance, DO, Jorge Kirk, DO, Geovanna Priest Garcia, DO, Becky Sloan MD, Pio Minor MD, Scarlet Cannon MD, Barbara Mcdowell MD, Karena Knowles MD, Letty Payne MD, Kanu Mckay MD, Sharlene Baldwin MD, Mbalbert Mcginnis MD, Jose Londono DO, Isamar Fernandez MD, Brittany Rodríguez DO, Meenu Blue MD,  Cathleen Tran DO, Ramiro Kimble MD, Nicole Cantor MD, Andrew Lowe, [facility], [facility], CNP, Kristina Obando, CNP, Jf Murray, CNS, Yen Melton, CNP, Kimberly Duncan, CNP, David Tolbert, CNP, Chinmay Mayberry, CNP, Seth Slater, CNP, Violet Ch PA-C, Justus Cummins, [facility], Lalitha López, CNP, Liss Robison, CNP, Autunm Capps, CNP, Anuradha Varma, CNP, Anastasiia Reyes, CNP, Srinivas Galan, [facility]   [address] [facility]    HISTORY AND PHYSICAL EXAMINATION            Date:   3/8/2021  Patient name:  Jef See  Date of admission:  3/7/2021 12:51 PM  MRN:   [id number]  Account:  [de-identified]  YOB: 1950  PCP:    LINUS Cespedes CNP  Room:   [address] [place] [address]  Code Status:    Full Code    Chief Complaint:     Chief Complaint   Patient presents with    Fall     x2 today and thinks he \"blacks out\"       History Obtained From:     patient    History of Present Illness:     Jef See is a 79 y.o. Non-/non  male who presents with Fall (x2 today and thinks he \"blacks out\")   and is admitted to the hospital for the management of Syncope and collapse. Patient reports he started blacking out 3 to 4 years ago. He saw a neurologist at that time who thought he was having TIAs, recommended the patient try to eliminate stress from his life. 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 He says the blackout started up again about 3 months ago when he was diagnosed with diabetes.   He says the doctors 300 MG capsule TAKE 1 CAPSULE FOUR TIMES A DAY 10/5/20 10/5/21 Yes LINUS Rendon CNP   famotidine (PEPCID) 40 MG tablet Take 1 tablet by mouth every evening 9/28/20  Yes LINUS Aldridge CNP   simvastatin (ZOCOR) 80 MG tablet TAKE 1 TABLET DAILY AT BEDTIME 9/21/20  Yes LINUS Rendon CNP   midodrine (PROAMATINE) 2.5 MG tablet TAKE 1 TABLET THREE TIMES A DAY 9/21/20  Yes Dylan Wood MD   meloxicam (MOBIC) 15 MG tablet Take 1 tablet by mouth daily 7/21/20  Yes LINUS Rendon CNP   omeprazole (PRILOSEC) 40 MG delayed release capsule Take 1 capsule by mouth daily 6/3/20  Yes LINUS Rendon CNP   topiramate (TOPAMAX) 50 MG tablet Take 1 tablet by mouth 2 times daily 5/26/20  Yes LINUS Rendon CNP   citalopram (CELEXA) 20 MG tablet TAKE ONE TABLET BY MOUTH ONCE DAILY 5/26/20  Yes LINUS Rendon CNP   busPIRone (BUSPAR) 10 MG tablet Take 1 tablet by mouth 3 times daily 4/27/20  Yes LINUS Aldridge CNP   blood glucose monitor strips Test 2  times a day & as needed for symptoms of irregular blood glucose. Dispense sufficient amount for indicated testing frequency plus additional to accommodate PRN testing needs.  2/5/21   LINUS Hampton CNP   blood glucose test strips (FREESTYLE LITE) strip Use to test blood sugar twice daily and as needed for symptoms of irregular blood sugar 2/5/21   LINUS Hampton CNP   oxybutynin (DITROPAN XL) 10 MG extended release tablet Take 1 tablet by mouth daily 1/11/21   LINUS Aldridge CNP   Lancets MISC Test glucose 3 times daily and as needed 1/4/21   LINUS Aldridge CNP   glucose monitoring kit (FREESTYLE) monitoring kit Supply glucometer, lancets and testing strips best covered by insurance 12/31/20   LINUS Aldridge CNP   MISC NATURAL PRODUCTS PO Take 1 tablet by mouth daily as needed (gas) (Herbal Bean and Vegetable Gas Relief)    Historical Provider, MD   traMADol (ULTRAM) 50 MG tablet Take 50 mg by mouth every 6 hours as needed for Pain (migraine). Historical Provider, MD   Blood Pressure Monitoring (BLOOD PRESS MONITOR/M-L CUFF) MISC 1 kit by Does not apply route daily. 14   Karina Palma, DO        Allergies:     Bee venom and Pcn [penicillins]    Social History:     Tobacco:    reports that he quit smoking about 23 years ago. He has a 60.00 pack-year smoking history. He has never used smokeless tobacco.  Alcohol:      reports previous alcohol use. Drug Use:  reports previous drug use. Drugs: Marijuana, Cocaine, and Other-see comments. Family History:     Family History   Problem Relation Age of Onset    Thyroid Cancer Mother     Migraines Mother     Cancer Mother     High Cholesterol Father     Heart Disease Father        Review of Systems:     Positive and Negative as described in HPI. Review of Systems   Constitutional: Negative. HENT: Negative. Eyes: Negative. Respiratory: Positive for shortness of breath. Negative for cough and chest tightness. Chronic shortness of breath   Cardiovascular: Negative. Gastrointestinal: Negative. Genitourinary: Negative. Musculoskeletal: Negative. Skin: Negative. Neurological: Positive for syncope. Negative for dizziness, numbness and headaches. Psychiatric/Behavioral: Negative. Physical Exam:   BP (!) 160/89   Pulse 59   Temp 98 °F (36.7 °C) (Oral)   Resp 18   Ht 5' 11\" (1.803 m)   Wt 199 lb (90.3 kg)   SpO2 94%   BMI 27.75 kg/m²   Temp (24hrs), Av.9 °F (36.6 °C), Min:97.5 °F (36.4 °C), Max:98.2 °F (36.8 °C)    Recent Labs     21  1613   POCGLU 122*       Intake/Output Summary (Last 24 hours) at 3/8/2021 1800  Last data filed at 3/8/2021 1752  Gross per 24 hour   Intake 1576.67 ml   Output 2950 ml   Net -1373.33 ml       Physical Exam  Vitals signs and nursing note reviewed. Constitutional:       Appearance: Normal appearance.    HENT: Head: Normocephalic and atraumatic. Right Ear: External ear normal.      Left Ear: External ear normal.      Nose: Nose normal. No rhinorrhea. Mouth/Throat:      Mouth: Mucous membranes are moist.   Eyes:      General: No scleral icterus. Right eye: No discharge. Left eye: No discharge. Extraocular Movements: Extraocular movements intact. Conjunctiva/sclera: Conjunctivae normal.      Pupils: Pupils are equal, round, and reactive to light. Neck:      Musculoskeletal: Normal range of motion and neck supple. Comments: No JVD  Cardiovascular:      Rate and Rhythm: Normal rate and regular rhythm. Pulses: Normal pulses. Heart sounds: Normal heart sounds. No murmur. No friction rub. No gallop. Pulmonary:      Effort: Pulmonary effort is normal. No respiratory distress. Breath sounds: No wheezing, rhonchi or rales. Comments: Breath sounds clear diminished throughout  Abdominal:      General: Bowel sounds are normal. There is no distension. Palpations: Abdomen is soft. Tenderness: There is no abdominal tenderness. There is no guarding. Hernia: No hernia is present. Musculoskeletal: Normal range of motion. Right lower leg: No edema. Left lower leg: No edema. Skin:     General: Skin is warm and dry. Coloration: Skin is not jaundiced or pale. Findings: No bruising, lesion or rash. Neurological:      General: No focal deficit present. Mental Status: He is alert and oriented to person, place, and time. Psychiatric:         Mood and Affect: Mood normal.         Behavior: Behavior normal.         Thought Content:  Thought content normal.         Judgment: Judgment normal.         Investigations:      Laboratory Testing:  Recent Results (from the past 24 hour(s))   Troponin    Collection Time: 03/07/21  9:21 PM   Result Value Ref Range    Troponin, High Sensitivity 11 0 - 22 ng/L    Troponin T NOT REPORTED <0.03 ng/mL Troponin Interp NOT REPORTED    Basic Metabolic Panel w/ Reflex to MG    Collection Time: 03/08/21  5:45 AM   Result Value Ref Range    Glucose 116 (H) 70 - 99 mg/dL    BUN 21 8 - 23 mg/dL    CREATININE 1.08 0.70 - 1.20 mg/dL    Bun/Cre Ratio NOT REPORTED 9 - 20    Calcium 8.5 (L) 8.6 - 10.4 mg/dL    Sodium 139 135 - 144 mmol/L    Potassium 4.3 3.7 - 5.3 mmol/L    Chloride 109 (H) 98 - 107 mmol/L    CO2 25 20 - 31 mmol/L    Anion Gap 5 (L) 9 - 17 mmol/L    GFR Non-African American >60 >60 mL/min    GFR African American >60 >60 mL/min    GFR Comment          GFR Staging NOT REPORTED    Magnesium    Collection Time: 03/08/21  5:45 AM   Result Value Ref Range    Magnesium 2.0 1.6 - 2.6 mg/dL   CBC    Collection Time: 03/08/21  5:45 AM   Result Value Ref Range    WBC 6.2 3.5 - 11.0 k/uL    RBC 4.30 (L) 4.5 - 5.9 m/uL    Hemoglobin 13.3 (L) 13.5 - 17.5 g/dL    Hematocrit 39.9 (L) 41 - 53 %    MCV 92.8 80 - 100 fL    MCH 30.9 26 - 34 pg    MCHC 33.2 31 - 37 g/dL    RDW 13.5 12.5 - 15.4 %    Platelets 654 (L) 215 - 450 k/uL    MPV 10.4 6.0 - 12.0 fL    NRBC Automated NOT REPORTED per 100 WBC   POC Glucose Fingerstick    Collection Time: 03/08/21  4:13 PM   Result Value Ref Range    POC Glucose 122 (H) 75 - 110 mg/dL       Imaging/Diagnostics:    Ct Head Wo Contrast    Result Date: 3/7/2021  CT head: 1. Mild atrophy. Mild-to-moderate chronic small vessel ischemic white matter disease. Atherosclerotic calcification of the vasculature. If there is clinical concern for acute on chronic ischemia, an MRI with diffusion-weighted imaging would be a more sensitive exam assuming there are no contraindications to MRI. 2. No acute intracranial hemorrhage or midline shift. CT of the cervical spine: 1. Mild-to-moderate degenerative changes within the cervical spine. 2. No acute vertebral body height loss or malalignment within the cervical spine. 3. Probable C7 vertebral body hemangioma. 4. Bilateral carotid vascular calcifications. 5. Moderate emphysema at the lung apices. Ct Cervical Spine Wo Contrast    Result Date: 3/7/2021  CT head: 1. Mild atrophy. Mild-to-moderate chronic small vessel ischemic white matter disease. Atherosclerotic calcification of the vasculature. If there is clinical concern for acute on chronic ischemia, an MRI with diffusion-weighted imaging would be a more sensitive exam assuming there are no contraindications to MRI. 2. No acute intracranial hemorrhage or midline shift. CT of the cervical spine: 1. Mild-to-moderate degenerative changes within the cervical spine. 2. No acute vertebral body height loss or malalignment within the cervical spine. 3. Probable C7 vertebral body hemangioma. 4. Bilateral carotid vascular calcifications. 5. Moderate emphysema at the lung apices. Ct Chest Pulmonary Embolism W Contrast    Result Date: 3/7/2021  No evidence of pulmonary embolism or acute thoracic aortic abnormality. Mild emphysema. Minimal bibasilar atelectasis. Otherwise, clear lungs. Cardiomegaly. No pleural or pericardial effusion. Assessment :      Hospital Problems           Last Modified POA    * (Principal) Syncope and collapse 3/8/2021 Yes    Anxiety 3/8/2021 Yes    Insomnia 3/8/2021 Yes    Gastroesophageal reflux disease 3/8/2021 Yes    Acquired hypothyroidism 3/8/2021 Yes          Plan:     Patient status observation in the Med/Surge    Syncope and collapse: Continue IV hydration as ordered. Up with assist.  Orthostatics to shift. NISSA hose to knees on during the day, off at night. 2D echo. Check TSH. Orthostatics noted to be positive this morning. Midodrine then increased to 10 mg 3 times daily. After 1 dose 10 mg midodrine, SBP noted be 160s. Midodrine then decreased back to 5 mg 3 times daily. Assess home meds to see if any of them have a side effect of hypotension or syncope. Fall precautions. Anxiety: Continue buspirone.   Insomnia: Continue trazodone  GERD: Continue Pepcid nightly  Hypothyroidism: Check TSH.     Consultations:   IP CONSULT TO SOCIAL WORK        LINUS Monteiro NP  3/8/2021  6:00 PM    Copy sent to LINUS Aguirre - KELLIE

## 2021-03-08 NOTE — PROGRESS NOTES
Physical Therapy    Facility/Department: [facility] MED SURG ICU  Initial Assessment    NAME: Sina Todd  : 1950  MRN: [id number]    Date of Service: 3/8/2021  Chief Complaint   Patient presents with   Velasquez Fall     x2 today and thinks he \"blacks out\"      Discharge Recommendations:  Patient would benefit from continued therapy after discharge   PT Equipment Recommendations  Equipment Needed: No  Other: Pt reports owning a RW, 4WW, and single point cane. Writer recommends use of RW    Assessment   Body structures, Functions, Activity limitations: Decreased functional mobility ; Decreased safe awareness;Decreased balance;Decreased strength;Decreased endurance;Decreased posture  Assessment: Pt limited secondary to decreased standing balance, decreased safety awareness, and decreased bilateral LE strength. Pt able to ambulate 180 feet with RW and SBA. Writer recommends continued use of RW. Pt would benefit from additional therapy upon discharge to decrease future fall risk. Prognosis: Good  Decision Making: Low Complexity  PT Education: Goals;Transfer Training;Equipment;PT Role;Functional Mobility Training;General Safety;Gait Training; Adaptive Device Training  REQUIRES PT FOLLOW UP: Yes  Activity Tolerance  Activity Tolerance: Patient Tolerated treatment well  Activity Tolerance: Pt with no increase in work of breathing during today's session. Patient Diagnosis(es): The encounter diagnosis was Syncope and collapse.     has a past medical history of Anxiety, Depression, Diabetes mellitus ([address] Ear infection, Headache(784.0), Hyperlipidemia, Insomnia, Osteoarthritis, PTSD (post-traumatic stress disorder), and Unspecified sleep apnea. has a past surgical history that includes Tonsillectomy and adenoidectomy and Foot surgery (Right).     Restrictions  Restrictions/Precautions  Restrictions/Precautions: Fall Risk  Required Braces or Orthoses?: No  Position Activity Restriction  Other position/activity restrictions: up with assistance  Vision/Hearing  Vision: Impaired  Vision Exceptions: Wears glasses at all times  Hearing: Exceptions to [facility]  Hearing Exceptions: No hearing aid(Pt reports L - partially deaf; R - nearly completely deaf)     Subjective  General  Patient assessed for rehabilitation services?: Yes  Response To Previous Treatment: Not applicable  Family / Caregiver Present: No  Follows Commands: Within Functional Limits  Subjective  Subjective: Pt supine in bed and agreeable to therapy this morning. RN agreeable to therapy. Pain Screening  Patient Currently in Pain: Yes  Pain Assessment  Pain Assessment: 0-10  Pain Level: 8  Pain Type: Chronic pain  Pain Location: Back  Pain Orientation: Lower  Functional Pain Assessment: Activities are not prevented  Non-Pharmaceutical Pain Intervention(s): Ambulation/Increased Activity;Repositioned; Emotional support;Distraction  Response to Pain Intervention: Patient Satisfied  Multiple Pain Sites: No  Vital Signs  Patient Currently in Pain: Yes  Pre Treatment Pain Screening  Intervention List: Patient able to continue with treatment    Orientation  Orientation  Overall Orientation Status: Within Functional Limits  Social/Functional History  Social/Functional History  Lives With: Spouse  Type of Home: Mobile home  Home Layout: One level  Home Access: Stairs to enter with rails  Entrance Stairs - Number of Steps: 4  Entrance Stairs - Rails: Both  Bathroom Shower/Tub: Walk-in shower  Bathroom Toilet: Standard  Bathroom Equipment: Grab bars in shower(pt reports need for shower chair)  Home Equipment: 4 wheeled walker, Rolling walker, [address] Help From: Family, Neighbor  ADL Assistance: Independent  Homemaking Assistance: Independent(Pt reports wife performs most household responsibilities)  Homemaking Responsibilities: Yes  Ambulation Assistance: Independent(use of cane inconsistently)  Transfer Assistance: Independent  Active : Yes  Patient's  Info: Pt reports has ceased driving over past 3 weeks. 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 Cognition   Cognition  Overall Cognitive Status: Exceptions  Arousal/Alertness: Appropriate responses to stimuli  Following Commands: Follows all commands without difficulty  Attention Span: Attends with cues to redirect  Safety Judgement: Decreased awareness of need for assistance    Objective     Observation/Palpation  Posture: Fair    AROM RLE (degrees)  RLE AROM: WFL(bilateral hamstring tightness limits knee extension AROM to lacking 15 degrees in seated)  AROM LLE (degrees)  LLE AROM : WFL(bilateral hamstring tightness limits knee extension AROM to lacking 15 degrees in seated)  AROM RUE (degrees)  RUE AROM : WFL  AROM LUE (degrees)  LUE AROM : WFL  Strength RLE  Strength RLE: WFL  Comment: Grossly 4+/5  Strength LLE  Strength LLE: WFL  Comment: Grossly 4+/5  Strength RUE  Comment: Co-evaluation with OT, see OT's note for detail  Strength LUE  Comment: Co-evaluation with OT, see OT's note for detail     Sensation  Overall Sensation Status: WFL(Pt denies numbness or tingling)  Bed mobility  Supine to Sit: Stand by assistance  Sit to Supine: (Pt retired up to chair at the conclusion of today's session.)  Scooting: Stand by assistance  Comment: Bed mobility performed with HOB elevated ~30 degrees. No reports of increased dizziness. Transfers  Sit to Stand: Stand by assistance  Stand to sit: Stand by assistance  Comment: Pt with no complaints of increased dizziness upon performing sit<>stand.   Ambulation  Ambulation?: Yes  More Ambulation?: Yes  Ambulation 1  Surface: level tile  Device: Rolling Walker  Other Apparatus: Wheelchair follow  Assistance: Stand by assistance  Quality of Gait: Pt with slightly restrictive device with modified independence to increase functional independence. Short term goal 2: Pt will demonstrate good standing balance to decrease fall risk. Short term goal 3: Pt will negotiate 4 stairs with bilateral handrails modified independently to allow the patient to enter into his prior living arrangement. Short term goal 4: Pt will perform sit<>stand transfers with independence to increase functional independence. Short term goal 5: Pt will tolerate a 40 minute therapy session to demonstrate improved endurance.        Therapy Time   Individual Concurrent Group Co-treatment   Time In 7563         Time Out 1213         Minutes 42         Timed Code Treatment Minutes: [address] [facility], PT

## 2021-03-09 VITALS
HEART RATE: 64 BPM | HEIGHT: 71 IN | WEIGHT: 190 LBS | BODY MASS INDEX: 26.6 KG/M2 | DIASTOLIC BLOOD PRESSURE: 91 MMHG | SYSTOLIC BLOOD PRESSURE: 177 MMHG | RESPIRATION RATE: 20 BRPM | TEMPERATURE: 98.5 F | OXYGEN SATURATION: 97 %

## 2021-03-09 LAB
ANION GAP SERPL CALCULATED.3IONS-SCNC: 6 MMOL/L (ref 9–17)
BUN BLDV-MCNC: 19 MG/DL (ref 8–23)
BUN/CREAT BLD: ABNORMAL (ref 9–20)
CALCIUM SERPL-MCNC: 8.5 MG/DL (ref 8.6–10.4)
CHLORIDE BLD-SCNC: 110 MMOL/L (ref 98–107)
CO2: 25 MMOL/L (ref 20–31)
CREAT SERPL-MCNC: 1.04 MG/DL (ref 0.7–1.2)
EKG ATRIAL RATE: 74 BPM
EKG P AXIS: -5 DEGREES
EKG P-R INTERVAL: 144 MS
EKG Q-T INTERVAL: 414 MS
EKG QRS DURATION: 100 MS
EKG QTC CALCULATION (BAZETT): 459 MS
EKG R AXIS: -9 DEGREES
EKG T AXIS: 30 DEGREES
EKG VENTRICULAR RATE: 74 BPM
FOLATE: 5.8 NG/ML
GFR AFRICAN AMERICAN: >60 ML/MIN
GFR NON-AFRICAN AMERICAN: >60 ML/MIN
GFR SERPL CREATININE-BSD FRML MDRD: ABNORMAL ML/MIN/{1.73_M2}
GFR SERPL CREATININE-BSD FRML MDRD: ABNORMAL ML/MIN/{1.73_M2}
GLUCOSE BLD-MCNC: 102 MG/DL (ref 70–99)
GLUCOSE BLD-MCNC: 135 MG/DL (ref 75–110)
GLUCOSE BLD-MCNC: 90 MG/DL (ref 75–110)
GLUCOSE BLD-MCNC: 96 MG/DL (ref 75–110)
HCT VFR BLD CALC: 40.2 % (ref 41–53)
HEMOGLOBIN: 13.3 G/DL (ref 13.5–17.5)
MCH RBC QN AUTO: 30.8 PG (ref 26–34)
MCHC RBC AUTO-ENTMCNC: 33.2 G/DL (ref 31–37)
MCV RBC AUTO: 92.8 FL (ref 80–100)
NRBC AUTOMATED: ABNORMAL PER 100 WBC
PDW BLD-RTO: 13.8 % (ref 12.5–15.4)
PLATELET # BLD: 126 K/UL (ref 140–450)
PMV BLD AUTO: 9.7 FL (ref 6–12)
POTASSIUM SERPL-SCNC: 4.5 MMOL/L (ref 3.7–5.3)
RBC # BLD: 4.33 M/UL (ref 4.5–5.9)
SODIUM BLD-SCNC: 141 MMOL/L (ref 135–144)
VITAMIN B-12: 218 PG/ML (ref 232–1245)
WBC # BLD: 5.1 K/UL (ref 3.5–11)

## 2021-03-09 PROCEDURE — 97110 THERAPEUTIC EXERCISES: CPT

## 2021-03-09 PROCEDURE — 6370000000 HC RX 637 (ALT 250 FOR IP): Performed by: NURSE PRACTITIONER

## 2021-03-09 PROCEDURE — APPSS45 APP SPLIT SHARED TIME 31-45 MINUTES: Performed by: NURSE PRACTITIONER

## 2021-03-09 PROCEDURE — 85027 COMPLETE CBC AUTOMATED: CPT

## 2021-03-09 PROCEDURE — 97116 GAIT TRAINING THERAPY: CPT

## 2021-03-09 PROCEDURE — 97535 SELF CARE MNGMENT TRAINING: CPT

## 2021-03-09 PROCEDURE — 99220 PR INITIAL OBSERVATION CARE/DAY 70 MINUTES: CPT | Performed by: PSYCHIATRY & NEUROLOGY

## 2021-03-09 PROCEDURE — 99225 PR SBSQ OBSERVATION CARE/DAY 25 MINUTES: CPT | Performed by: INTERNAL MEDICINE

## 2021-03-09 PROCEDURE — 97530 THERAPEUTIC ACTIVITIES: CPT

## 2021-03-09 PROCEDURE — 82947 ASSAY GLUCOSE BLOOD QUANT: CPT

## 2021-03-09 PROCEDURE — 82607 VITAMIN B-12: CPT

## 2021-03-09 PROCEDURE — 82746 ASSAY OF FOLIC ACID SERUM: CPT

## 2021-03-09 PROCEDURE — 96361 HYDRATE IV INFUSION ADD-ON: CPT

## 2021-03-09 PROCEDURE — G0378 HOSPITAL OBSERVATION PER HR: HCPCS

## 2021-03-09 PROCEDURE — 2580000003 HC RX 258: Performed by: NURSE PRACTITIONER

## 2021-03-09 PROCEDURE — 36415 COLL VENOUS BLD VENIPUNCTURE: CPT

## 2021-03-09 PROCEDURE — 80048 BASIC METABOLIC PNL TOTAL CA: CPT

## 2021-03-09 RX ORDER — SIMETHICONE 80 MG
80 TABLET,CHEWABLE ORAL 4 TIMES DAILY PRN
Status: DISCONTINUED | OUTPATIENT
Start: 2021-03-09 | End: 2021-03-09 | Stop reason: HOSPADM

## 2021-03-09 RX ADMIN — CITALOPRAM HYDROBROMIDE 20 MG: 20 TABLET, FILM COATED ORAL at 09:22

## 2021-03-09 RX ADMIN — ATORVASTATIN CALCIUM 40 MG: 40 TABLET, FILM COATED ORAL at 09:23

## 2021-03-09 RX ADMIN — CETIRIZINE HYDROCHLORIDE 10 MG: 10 TABLET, FILM COATED ORAL at 09:22

## 2021-03-09 RX ADMIN — OXYBUTYNIN CHLORIDE 10 MG: 5 TABLET, EXTENDED RELEASE ORAL at 09:23

## 2021-03-09 RX ADMIN — PANTOPRAZOLE SODIUM 40 MG: 40 TABLET, DELAYED RELEASE ORAL at 06:29

## 2021-03-09 RX ADMIN — LEVOTHYROXINE SODIUM 88 MCG: 88 TABLET ORAL at 06:29

## 2021-03-09 RX ADMIN — TOPIRAMATE 50 MG: 25 TABLET, FILM COATED ORAL at 09:33

## 2021-03-09 RX ADMIN — SODIUM CHLORIDE: 9 INJECTION, SOLUTION INTRAVENOUS at 10:55

## 2021-03-09 RX ADMIN — GABAPENTIN 300 MG: 300 CAPSULE ORAL at 09:22

## 2021-03-09 RX ADMIN — MELOXICAM 15 MG: 7.5 TABLET ORAL at 09:23

## 2021-03-09 RX ADMIN — SIMETHICONE CHEW TAB 80 MG 80 MG: 80 TABLET ORAL at 10:55

## 2021-03-09 RX ADMIN — BUSPIRONE HYDROCHLORIDE 10 MG: 5 TABLET ORAL at 13:35

## 2021-03-09 RX ADMIN — GABAPENTIN 300 MG: 300 CAPSULE ORAL at 13:35

## 2021-03-09 RX ADMIN — BUSPIRONE HYDROCHLORIDE 10 MG: 5 TABLET ORAL at 09:22

## 2021-03-09 ASSESSMENT — ENCOUNTER SYMPTOMS
WHEEZING: 0
NAUSEA: 0
CONSTIPATION: 0
SHORTNESS OF BREATH: 0
CHEST TIGHTNESS: 0
ABDOMINAL PAIN: 0
DIARRHEA: 0
VOMITING: 0
BLOOD IN STOOL: 0
COUGH: 0

## 2021-03-09 ASSESSMENT — PAIN DESCRIPTION - PAIN TYPE: TYPE: CHRONIC PAIN

## 2021-03-09 ASSESSMENT — PAIN - FUNCTIONAL ASSESSMENT: PAIN_FUNCTIONAL_ASSESSMENT: ACTIVITIES ARE NOT PREVENTED

## 2021-03-09 ASSESSMENT — PAIN DESCRIPTION - LOCATION: LOCATION: BACK

## 2021-03-09 ASSESSMENT — PAIN DESCRIPTION - FREQUENCY: FREQUENCY: CONTINUOUS

## 2021-03-09 ASSESSMENT — PAIN DESCRIPTION - PROGRESSION: CLINICAL_PROGRESSION: NOT CHANGED

## 2021-03-09 NOTE — PLAN OF CARE
Problem: Falls - Risk of:  Goal: Will remain free from falls  Description: Will remain free from falls  3/9/2021 0322 by Erum Hess RN  Outcome: Ongoing  Siderails up x 2  Hourly rounding  Call light in reach  Instructed to call for assist before attempting out of bed.   Remains free from falls and accidental injury at this time   Floor free from obstacles  Bed is locked and in lowest position  Adequate lighting provided  Bed alarm on, [place] star and Stay with Me signs posted      Problem: Safety:  Goal: Free from accidental physical injury  Description: Free from accidental physical injury  3/9/2021 0322 by Erum Hess RN  Outcome: Ongoing     Problem: Daily Care:  Goal: Daily care needs are met  Description: Daily care needs are met  3/9/2021 0322 by Erum Hess RN  Outcome: Ongoing     Problem: Musculor/Skeletal Functional Status  Goal: Highest potential functional level  3/9/2021 0322 by Erum Hess RN  Outcome: Ongoing

## 2021-03-09 NOTE — FLOWSHEET NOTE
03/09/21 0744   Vital Signs   Orthostatic B/P and Pulse?  Yes   Blood Pressure Lying 151/63   Pulse Lying 64 PER MINUTE   Blood Pressure Sitting 112/72   Pulse Sitting 80 PER MINUTE   Blood Pressure Standing 90/59   Pulse Standing 76 PER MINUTE   Level of Consciousness Alert (0)   MEWS Score 1   Patient Currently in Pain Denies     Orthostatic BP's for this shift

## 2021-03-09 NOTE — CONSULTS
[address] CONSULT NOTE    PATIENT NAME: Milo Brittle   PATIENT MRN: [id number]   PRIMARY CARE PHYSICIAN: Valentino Cooper, LINUS - CNP  CONSULT REQUESTED BY: Consults  DATE OF SERVICE: 3/9/2021    CHIEF COMPLAINT: Fall (x2 today and thinks he \"blacks out\")     HPI: Milo Brittle is a 79year old RH WM who was admitted after passing out events. 2 days ago, at home, he went to bathroom, he passed out without any warning, it occurred after he picked up toilet seat, he passed out for less than a minute, he hit the top portion of toilet, the backward fell on the floor, noone witnessed. He had no postictal confusion/tiredness, 2 hours later, he went to bathroom counter, he bent down, when he stood up, he passed out again, fell on the ground, LOC for about 1 minute, no postictal.     In Jan, he was diagnosed with DM, since then he started to have passing out event, so far, he has had about 12 times. He does not check blood pressure at home. In 3-4 years ago, he \"black out\" while driving, then he could get home safely but he did not recall the events. He went to neurologist who thought he had TIA. After that, he retired, also sold his motorcycle. He took some medications for migraine, managed anxiety, that black out events resolved. He admits stress \"home staff\", worries about children, wife. He has anxiety, depression on medications. Sleep is good with medication. Quit smoking 20 years (used to be heavy smoker, 3 ppd for more than 20 years), quit drinking 10 years ago (heavy drinker for more than 10 years). No drugs. He used to work at [employer]. Orthostatic +, no history of seizures. He is on topamax 50mg BID for headache (HA not often, maybe once in few months)    NEUROLOGICAL TESTS    1.  Mild atrophy.  Mild-to-moderate chronic small vessel ischemic white matter disease.  Atherosclerotic calcification of the vasculature.  If there is   clinical concern for acute on chronic ischemia, an MRI with   diffusion-weighted imaging would be a more sensitive exam assuming there are   no contraindications to MRI. 2. No acute intracranial hemorrhage or midline shift. CT of the cervical spine:       1. Mild-to-moderate degenerative changes within the cervical spine. 2. No acute vertebral body height loss or malalignment within the cervical   spine. 3. Probable C7 vertebral body hemangioma. 4. Bilateral carotid vascular calcifications.    5. Moderate emphysema at the lung apices.          EF 60%  NSR  A1c 6.0  LDL 33 (2020)    PAST MEDICAL HISTORY:         Diagnosis Date    Anxiety     Depression     Diabetes mellitus (HCC)     Ear infection     history of    Headache(784.0)     migraines    Hyperlipidemia     Insomnia     Osteoarthritis     PTSD (post-traumatic stress disorder)     Unspecified sleep apnea         PAST SURGICAL HISTORY:         Procedure Laterality Date    FOOT SURGERY Right     spur    TONSILLECTOMY AND ADENOIDECTOMY          SOCAIL HISTORY:     Social History     Socioeconomic History    Marital status:      Spouse name: Not on file    Number of children: Not on file    Years of education: Not on file    Highest education level: Not on file   Occupational History    Not on file   Social Needs    Financial resource strain: Somewhat hard    Food insecurity     Worry: Often true     Inability: Often true    Transportation needs     Medical: No     Non-medical: No   Tobacco Use    Smoking status: Former Smoker     Packs/day: 3.00     Years: 20.00     Pack years: 60.00     Quit date:      Years since quittin.2    Smokeless tobacco: Never Used   Substance and Sexual Activity    Alcohol use: Not Currently     Frequency: Never     Comment: occ    Drug use: Not Currently     Types: Marijuana, Cocaine, Other-see comments Comment: no drug use since 1979    Sexual activity: Not on file   Lifestyle    Physical activity     Days per week: 0 days     Minutes per session: 0 min    Stress:  To some extent   Relationships    Social connections     Talks on phone: More than three times a week     Gets together: More than three times a week     Attends Christianity service: Never     Active member of club or organization: No     Attends meetings of clubs or organizations: Never     Relationship status:     Intimate partner violence     Fear of current or ex partner: Not on file     Emotionally abused: Not on file     Physically abused: Not on file     Forced sexual activity: Not on file   Other Topics Concern    Not on file   Social History Narrative    Not on file       MEDICATIONS:     Current Facility-Administered Medications   Medication Dose Route Frequency Provider Last Rate Last Admin    simethicone (MYLICON) chewable tablet 80 mg  80 mg Oral 4x Daily PRN Lex Reepaulino, APRN - CNP   80 mg at 03/09/21 1055    insulin lispro (HUMALOG) injection vial 0-6 Units  0-6 Units Subcutaneous TID WC Jeffery Askew, APRN - NP        insulin lispro (HUMALOG) injection vial 0-3 Units  0-3 Units Subcutaneous Nightly Jeffery Askew, APRN - NP        glucose (GLUTOSE) 40 % oral gel 15 g  15 g Oral PRN Jeffery Askew, APRN - NP        dextrose 50 % IV solution  12.5 g Intravenous PRN Jeffery Askew, APRN - NP        glucagon (rDNA) injection 1 mg  1 mg Intramuscular PRN Jeffery Askew, APRN - NP        dextrose 5 % solution  100 mL/hr Intravenous PRN Jeffery Askew, APRN - NP        traZODone (DESYREL) tablet 50 mg  50 mg Oral Nightly Jeffery Askew, APRN - NP   50 mg at 03/08/21 2211    gabapentin (NEURONTIN) capsule 300 mg  300 mg Oral TID Jeffery Askew, APRN - NP   300 mg at 03/09/21 9519    cetirizine (ZYRTEC) tablet 10 mg  10 mg Oral Daily Jeffery Askew, APRN - NP   10 mg at 03/09/21 0922    0.9 % sodium chloride infusion Deirdre El, LINUS - NP        magnesium sulfate 1000 mg in dextrose 5% 100 mL IVPB  1,000 mg Intravenous PRN LINUS Brown - KAMILAH        enoxaparin (LOVENOX) injection 40 mg  40 mg Subcutaneous Daily Gail Blanco, LINUS - CNP        nicotine (NICODERM CQ) 21 MG/24HR 1 patch  1 patch Transdermal Daily PRN LINUS Brown - KAMILAH        promethazine (PHENERGAN) tablet 12.5 mg  12.5 mg Oral Q6H PRN LINUS Brown - KAMILAH        Or    ondansetron (ZOFRAN) injection 4 mg  4 mg Intravenous Q6H PRN LINUS Brown - KAMILAH        polyethylene glycol (GLYCOLAX) packet 17 g  17 g Oral Daily PRN LINUS Brown - NP        acetaminophen (TYLENOL) tablet 650 mg  650 mg Oral Q6H PRN LINUS Brown - KAMILAH        Or   [facility] acetaminophen (TYLENOL) suppository 650 mg  650 mg Rectal Q6H PRN LINUS Brown - KAMILAH        perflutren lipid microspheres (DEFINITY) injection 1.65 mg  1.5 mL Intravenous ONCE PRN LINUS Brown - KAMILAH            ALLERGIES:     Allergies   Allergen Reactions    Bee Venom Anaphylaxis    Pcn [Penicillins] Anaphylaxis       REVIEW OF SYSTEMS:         All items selected indicate a positive finding. Those items not selected are negative.   Constitutional [] Weight loss/gain   [x] Fatigue  [] Fever/Chills   HEENT [x] Hearing Loss  [] Visual Disturbance  [] Tinnitus  [] Eye pain   Respiratory [x] Shortness of Breath  [] Cough  [] Snoring   Cardiovascular [] Chest Pain  [] Palpitations  [] Lightheaded   GI [] Constipation  [] Diarrhea  [] Swallowing change  [] Nausea/vomiting    [] Urinary Frequency  [] Urinary Urgency   Musculoskeletal [x] Neck pain  [x] Back pain  [] Muscle pain  [] Restless legs   Dermatologic [] Skin changes   Neurologic [] Memory loss/confusion  [] Seizures  [] Trouble walking or imbalance  [] Dizziness  [] Sleep disturbance  [x] Weakness  [] Numbness  [] Tremors  [] Speech Difficulty  [] Headaches  [] Light Sensitivity  [] Sound Sensitivity   Endocrinology biceps 5/5, triceps 5/5,  5/5  LUE: delta 5/5, biceps 5/5, triceps 5/5,  5/5  RLE: hf 5/5, ke 5/5, kf 5/5, df 5/5, pf 5/5  LLE: hf 5/5, ke 5/5, kf 5/5, df 5/5, pf 5/5     Coordination FNF no dysmetria, heel to shin okay, FRANCA okay, Romberg positive      Reflex function 1+ DTR and symmetric. Negative Babinski      Gait                  Normal station and gait, Tandem deferred      Sensory function Stock distribution reduction to temp/pp below knees, decreased position sense in both big toes, no extinction     LABS & TESTS     Lab Results   Component Value Date    WBC 5.1 03/09/2021    HGB 13.3 (L) 03/09/2021    HCT 40.2 (L) 03/09/2021    MCV 92.8 03/09/2021     (L) 03/09/2021       ASSESSMENT:   1. Cardiogenic syncope, especially positive orthostatic, does not sound like stroke or seizures  2. Depression, anxiety   3. Romberg positive   4. Length dependent sensory neuropathy, secondary to history of heavy drinking  6. Emphysema, secondary to history of heavy smoking    PLANS:   1. Continue syncope workup per primary or cardiology  2. No need EEG or AED  3. Avoid sudden position changes, avoid dehydration  4. Check B12, folate, B1, B6, supplement as needed      Thank you for giving us the opportunity to participate in Mr. Tom wilson. Neurology is signing off, please call with any questions.        Federico Rodriguez MD  Neurology Attending  Clinic: [phone]

## 2021-03-09 NOTE — PROGRESS NOTES
Physical Therapy  Facility/Department: [facility] SURG ICU  Daily Treatment Note  NAME: Milo Brittle  : 1950  MRN: [id number]    Date of Service: 3/9/2021    Discharge Recommendations:  Patient would benefit from continued therapy after discharge   PT Equipment Recommendations  Equipment Needed: No  Other: Pt reports owning a RW, 4WW, and single point cane. Writer recommends use of RW    Assessment   Body structures, Functions, Activity limitations: Decreased functional mobility ; Decreased safe awareness;Decreased balance;Decreased strength;Decreased endurance;Decreased posture  Assessment: Pt limited secondary to decreased standing balance, slightly decreased safety awareness, and decreased bilateral LE strength. Pt able to ambulate 2x150 feet with a single point cane and CGA with 1x LOB. Writer recommends use of RW during ambulation. Pt would benefit from additional therapy upon discharge to decrease future fall risk. PT Education: Goals;Transfer Training;Equipment;PT Role;Functional Mobility Training;General Safety;Gait Training; Adaptive Device Training  Patient Education: Pt educated on symptom management- reorienting self upon performing sit<>stand transfers, focusing on single point while feeling dizzy. REQUIRES PT FOLLOW UP: Yes  Activity Tolerance  Activity Tolerance: Patient Tolerated treatment well;Patient limited by fatigue  Activity Tolerance: Pt with 1x minor LOB during ambulation during today's session, pt reports some increased fatigue at the conclusion of performing standing exercises. Patient Diagnosis(es): The encounter diagnosis was Syncope and collapse.     has a past medical history of Anxiety, Depression, Diabetes mellitus ([facility] [address] Ear infection, Headache(784.0), Hyperlipidemia, Insomnia, Osteoarthritis, PTSD (post-traumatic stress disorder), and Unspecified sleep apnea.    has a past surgical history that includes Tonsillectomy and adenoidectomy and Foot surgery (Right). Restrictions  Restrictions/Precautions  Restrictions/Precautions: Fall Risk  Required Braces or Orthoses?: No  Position Activity Restriction  Other position/activity restrictions: up with assistance  Subjective   General  Response To Previous Treatment: Patient with no complaints from previous session. Family / Caregiver Present: No  Subjective  Subjective: Pt supine in bed and agreeable to therapy this morning. RN agreeable to therapy. Pain Screening  Patient Currently in Pain: Denies  Vital Signs  Pulse: 67  BP: 122/76  BP Location: Left upper arm  Patient Currently in Pain: Denies       Orientation  Orientation  Overall Orientation Status: Within Functional Limits  Cognition   Cognition  Overall Cognitive Status: Exceptions  Arousal/Alertness: Appropriate responses to stimuli  Following Commands: Follows all commands without difficulty  Attention Span: Attends with cues to redirect  Safety Judgement: Decreased awareness of need for assistance;Decreased awareness of need for safety  Objective   Bed mobility  Supine to Sit: Stand by assistance  Sit to Supine: Stand by assistance  Scooting: Supervision  Comment: Bed mobility performed with HOB elevated ~30 degrees, no complaints of increased dizziness. Transfers  Sit to Stand: Stand by assistance  Stand to sit: Stand by assistance  Comment: Pt with no complaints of increased dizziness upon performing sit<>stand. Sit<>stand transfers performed 4x during today's session. Ambulation  Ambulation?: Yes  More Ambulation?: No  Ambulation 1  Surface: level tile  Device: Single point cane  Assistance: Contact guard assistance  Quality of Gait: Pt with 1x minor LOB, decreased step length, decreased gait speed.   Gait Deviations: Slow Radha;Decreased step length;Decreased step height  Distance: 2x150 feet  Comments: Recommend continued use of RW  Stairs/Curb  Stairs?: Yes  Stairs  # Steps : 4  Stairs Height: 6\"  Rails: Left ascending  Device: Single pt cane  Assistance: Stand by assistance  Comment: Pt able to appropriately sequence without verbal cues. Exercises  Hip Flexion: 2x10 bilateral (12 inches) in standing with BUE support  Hip Abduction: 2x10 bilateral in standing with BUE support  Ankle Pumps: 2x10 bilateral in standing with BUE support  Other exercises  Other exercises?: No                           Goals  Short term goals  Time Frame for Short term goals: 14 visits  Short term goal 1: Pt will ambulate 400 feet with least restrictive device with modified independence to increase functional independence. Short term goal 2: Pt will demonstrate good standing balance to decrease fall risk. Short term goal 3: Pt will negotiate 4 stairs with bilateral handrails modified independently to allow the patient to enter into his prior living arrangement. Short term goal 4: Pt will perform sit<>stand transfers with independence to increase functional independence. Short term goal 5: Pt will tolerate a 40 minute therapy session to demonstrate improved endurance.     Plan    Plan  Times per week: 5  Times per day: Daily  Current Treatment Recommendations: Strengthening, Transfer Training, Endurance Training, Balance Training, Gait Training, Functional Mobility Training, Stair training, Safety Education & Training, ROM, Patient/Caregiver Education & Training, Home Exercise Program  Safety Devices  Type of devices: Patient at risk for falls, Gait belt, Left in bed, Bed alarm in place, Call light within reach  Restraints  Initially in place: No     Therapy Time   Individual Concurrent Group Co-treatment   Time In 1011         Time Out 1051         Minutes 40         Timed Code Treatment Minutes: FIONA Zazueta 65, PT

## 2021-03-09 NOTE — CARE COORDINATION
TRANSITIONAL CARE PLANNING/ 2 [facility] Day: 2    Reason for Admission: Syncope and collapse [R55]     Treatment Plan of Care: fall concerns, syncope    Tests/Procedures still needed: labs ordered    Barriers to Discharge: orthostatic hypotenstion    Readmission Risk    Risk of Unplanned Readmission:        0       Patient goals/Treatment Preferences/Transitional Plan: home with spouse, independent    Referrals Made: none    Follow Up needed: has neuro follow-up appt already made

## 2021-03-09 NOTE — PROGRESS NOTES
Patient updated about discharge orders placed; will not have a ride until sometime after dinner; continue to monitor

## 2021-03-09 NOTE — FLOWSHEET NOTE
03/09/21 1128   Vital Signs   Orthostatic B/P and Pulse?  Yes   Blood Pressure Lying 173/91   Pulse Lying 61 PER MINUTE   Blood Pressure Sitting 141/79   Pulse Sitting 65 PER MINUTE   Blood Pressure Standing 112/70   Pulse Standing 73 PER MINUTE   Level of Consciousness Alert (0)       New set of orthostatic BP's

## 2021-03-09 NOTE — DISCHARGE SUMMARY
[facility]  Office: [phone]  Ervin Villagran, DO, Radha Sosa, DO, Phuc Ribera DO, Pippa Garcia, DO, Andria Mayo MD, Darlin Ruiz MD, Jina Jacobo MD, Rob Whitman MD, Modesto Markham MD, Gladys Villalobos MD, Manjit Rico MD, Katerina King MD, Mbon Vicky Chow MD, Nita Martin DO, Sienna López MD, Guru Trujillo DO, Lady Jerald MD,  Ondina Oconnell DO, Narcisa Johansen MD, Obdulia Arceo MD, Tammy Hanna, Emma Swann, CNP, Nika Syed, CNP, Hood Saha, CNS, Francesca Andre, CNP, Adrienne Triplett, CNP, Yokasta Corrales, CNP, Jeannette Joseph, CNP, Ej Esposito, CNP, Nestor Robledo PA-C, David Villalpando, [facility], Bravo Iniguez, CNP, Archana Hobson, CNP, Ly Byrd, CNP, Jose Guadalupe Carolina, CNP, Lucy Walker, CNP, Colin Guerra 4130    Discharge Summary     Patient ID: Tanner Fournier  :  1950   MRN: [id number]     ACCOUNT:  [de-identified]   Patient's PCP: LINUS Bautista CNP  Admit Date: 3/7/2021   Discharge Date: 3/9/2021   Length of Stay: 0  Code Status:  Full Code  Admitting Physician: Carina López MD  Discharge Physician: LINUS Bradford CNP     Active Discharge Diagnoses:     Hospital Problem Lists:  Principal Problem:    Syncope and collapse  Active Problems:    Anxiety    Insomnia    Gastroesophageal reflux disease    Acquired hypothyroidism    Orthostatic hypotension  Resolved Problems:    * No resolved hospital problems.  *      Admission Condition:  fair     Discharged Condition: stable    Hospital Stay:     Hospital Course:  Micah Vizcarra a 79 y.o. Non-/non  male who presents with Fall (x2 today and thinks he \"blacks out\")   and is admitted to the hospital for the management of Syncope and collapse.     Patient reports he started blacking out 3 to 4 years ago. Missael Dick saw a neurologist at that time who thought he was having TIAs, recommended the patient try to eliminate stress from his life. 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 Missael Dick says the blackout started up again about 3 months ago when he was diagnosed with diabetes. Missael Dick says the doctors then thought it was due to low blood sugar.  Over the last 2 weeks, his blackout episodes have been increasing. Missael Dick says he really has no warning that he is going to pass out other than the tip of his ears get very hot and his knees get weak and then he passes out. Missael Dick says he then wakes up on the ground.      Twelve-lead EKG was unremarkable.  CT the head without contrast and CT of the cervical spine without contrast were completed and were unremarkable except for bilateral carotid vascular calcifications noted as well as lucencies within the C7 vertebral body favored to represent vertebral body hemangioma.     Hemoglobin A1c 6.0  TSH 1.07  Echo completed showing normal left ventricular size and function. EF 60%. Mild aortic insufficiency     Patient hydrated and midodrine was continued. He remains positive for orthostatic drop in BP. Dr. Marcella Estrella with neurology evaluated the patient. She recommends avoiding sudden position changes, avoid dehydration and compression hose. Patient encouraged to follow up with his PCP in 7-10 days and to establish with cardiology for further workup.            Significant therapeutic interventions: hydration and Timmy hose    Significant Diagnostic Studies:   Labs / Micro:  CBC:   Lab Results   Component Value Date    WBC 5.1 03/09/2021    RBC 4.33 03/09/2021    HGB 13.3 03/09/2021    HCT 40.2 03/09/2021    MCV 92.8 03/09/2021    MCH 30.8 03/09/2021    MCHC 33.2 03/09/2021    RDW 13.8 03/09/2021     03/09/2021     BMP:    Lab Results   Component Value Date    GLUCOSE 102 03/09/2021     03/09/2021    K 4.5 03/09/2021     03/09/2021    CO2 25 03/09/2021    ANIONGAP 6 03/09/2021    BUN 19 03/09/2021 CREATININE 1.04 03/09/2021    BUNCRER NOT REPORTED 03/09/2021    CALCIUM 8.5 03/09/2021    LABGLOM >60 03/09/2021    GFRAA >60 03/09/2021    GFR      03/09/2021    GFR NOT REPORTED 03/09/2021     HFP:    Lab Results   Component Value Date    PROT 7.1 03/07/2021     FLP:    Lab Results   Component Value Date    CHOL 104 11/20/2020    CHOL 116 06/03/2016    TRIG 179 11/20/2020    HDL 35 11/20/2020     U/A:    Lab Results   Component Value Date    COLORU YELLOW 03/07/2021    TURBIDITY CLEAR 03/07/2021    SPECGRAV 1.010 03/07/2021    HGBUR NEGATIVE 03/07/2021    PHUR 5.5 03/07/2021    PROTEINU NEGATIVE 03/07/2021    GLUCOSEU NEGATIVE 03/07/2021    KETUA NEGATIVE 03/07/2021    BILIRUBINUR NEGATIVE 03/07/2021    UROBILINOGEN Normal 03/07/2021    NITRU NEGATIVE 03/07/2021    LEUKOCYTESUR NEGATIVE 03/07/2021     TSH:    Lab Results   Component Value Date    TSH 1.07 03/07/2021        Radiology:  Ct Head Wo Contrast    Result Date: 3/7/2021  CT head: 1. Mild atrophy. Mild-to-moderate chronic small vessel ischemic white matter disease. Atherosclerotic calcification of the vasculature. If there is clinical concern for acute on chronic ischemia, an MRI with diffusion-weighted imaging would be a more sensitive exam assuming there are no contraindications to MRI. 2. No acute intracranial hemorrhage or midline shift. CT of the cervical spine: 1. Mild-to-moderate degenerative changes within the cervical spine. 2. No acute vertebral body height loss or malalignment within the cervical spine. 3. Probable C7 vertebral body hemangioma. 4. Bilateral carotid vascular calcifications. 5. Moderate emphysema at the lung apices. Ct Cervical Spine Wo Contrast    Result Date: 3/7/2021  CT head: 1. Mild atrophy. Mild-to-moderate chronic small vessel ischemic white matter disease. Atherosclerotic calcification of the vasculature.   If there is clinical concern for acute on chronic ischemia, an MRI with diffusion-weighted imaging would be a more sensitive exam assuming there are no contraindications to MRI. 2. No acute intracranial hemorrhage or midline shift. CT of the cervical spine: 1. Mild-to-moderate degenerative changes within the cervical spine. 2. No acute vertebral body height loss or malalignment within the cervical spine. 3. Probable C7 vertebral body hemangioma. 4. Bilateral carotid vascular calcifications. 5. Moderate emphysema at the lung apices. Ct Chest Pulmonary Embolism W Contrast    Result Date: 3/7/2021  No evidence of pulmonary embolism or acute thoracic aortic abnormality. Mild emphysema. Minimal bibasilar atelectasis. Otherwise, clear lungs. Cardiomegaly. No pleural or pericardial effusion. Consultations:    Consults:     Final Specialist Recommendations/Findings:   IP CONSULT TO SOCIAL WORK  IP CONSULT TO NEUROLOGY      The patient was seen and examined on day of discharge and this discharge summary is in conjunction with any daily progress note from day of discharge. Discharge plan:     Disposition: Home    Physician Follow Up:     Gayle Pang, APRN - CNP  [address] [place] [address] [address]  [address]  [phone]             Requiring Further Evaluation/Follow Up POST HOSPITALIZATION/Incidental Findings: recommend cardiology evaluation related to cardiogenic hypotension. CT revealed Bilateral carotid vascular calcifications. He may benefit from carotid US    Diet: cardiac diet, low fat, low cholesterol diet and encourage fluids    Activity: As tolerated    Instructions to Patient: Changing positions slowly. Patient encouraged to sit and rest in between lying to sitting and again with sitting to standing prior to ambulating. Call 911 or return to the ER if you experience a recurrence of your symptoms or start having fever, chills, chest pain or shortness of breath.       Discharge Medications:      Medication List      CONTINUE taking these medications    * blood glucose test strips  Test 2  times a day & as needed for symptoms of irregular blood glucose. Dispense sufficient amount for indicated testing frequency plus additional to accommodate PRN testing needs. * FREESTYLE LITE strip  Generic drug: blood glucose test strips  Use to test blood sugar twice daily and as needed for symptoms of irregular blood sugar     Blood Press Monitor/M-L Cuff Misc  1 kit by Does not apply route daily.      busPIRone 10 MG tablet  Commonly known as: BUSPAR  Take 1 tablet by mouth 3 times daily     citalopram 20 MG tablet  Commonly known as: CELEXA  TAKE ONE TABLET BY MOUTH ONCE DAILY     ezetimibe 10 MG tablet  Commonly known as: ZETIA  TAKE 1 TABLET DAILY     famotidine 40 MG tablet  Commonly known as: PEPCID  Take 1 tablet by mouth every evening     fluticasone 50 MCG/ACT nasal spray  Commonly known as: FLONASE     gabapentin 300 MG capsule  Commonly known as: NEURONTIN  TAKE 1 CAPSULE FOUR TIMES A DAY     glucose monitoring kit monitoring kit  Supply glucometer, lancets and testing strips best covered by insurance     Lancets Misc  Test glucose 3 times daily and as needed     levothyroxine 88 MCG tablet  Commonly known as: SYNTHROID  TAKE 1 TABLET DAILY     meloxicam 15 MG tablet  Commonly known as: MOBIC  Take 1 tablet by mouth daily     midodrine 2.5 MG tablet  Commonly known as: PROAMATINE  TAKE 1 TABLET THREE TIMES A DAY     MISC NATURAL PRODUCTS PO     omeprazole 40 MG delayed release capsule  Commonly known as: PRILOSEC  Take 1 capsule by mouth daily     oxybutynin 10 MG extended release tablet  Commonly known as: Ditropan XL  Take 1 tablet by mouth daily     simvastatin 80 MG tablet  Commonly known as: ZOCOR  TAKE 1 TABLET DAILY AT BEDTIME     temazepam 15 MG capsule  Commonly known as: RESTORIL  TAKE ONE CAPSULE BY MOUTH AT BEDTIME     topiramate 50 MG tablet  Commonly known as: TOPAMAX  Take 1 tablet by mouth 2 times daily     traMADol 50 MG tablet  Commonly known as: ULTRAM     traZODone 100 MG tablet  Commonly known as: DESYREL         * This list has 2 medication(s) that are the same as other medications prescribed for you. Read the directions carefully, and ask your doctor or other care provider to review them with you. No discharge procedures on file. Time Spent on discharge is  31 mins in patient examination, evaluation, counseling as well as medication reconciliation, prescriptions for required medications, discharge plan and follow up. Electronically signed by   LINUS Barber CNP  3/9/2021  3:28 PM      Thank you LINUS Leos CNP for the opportunity to be involved in this patient's care.

## 2021-03-09 NOTE — PROGRESS NOTES
RT in to talk to patient regarding hx of [place]. Pt states he has not used a cpap machine in over a year because he felt he no longer needed it; no longer owns a machine at all due to not using it. Pt not interested in using hospital machine at this time.   spo2 97% on room air

## 2021-03-09 NOTE — PROGRESS NOTES
[facility]  Office: [address], DO, Shiela Zepeda, DO, Christophershaziameet Grewal, DO, Jacey Miller Blood, DO, April Thompson MD, Lolly Campos MD, Dinora Murrell MD, Олег Welch MD, Gretchen Thakur MD, Neela Guan MD, Evelin Marin MD, Sadni Duran MD, Mei Heck MD, Tin Anderson, DO, Anjum Quinonez MD, Mindi Easton, DO, Ervin Cook MD,  Shaila Guerrero, DO, Kenyatta Fay MD, Spencer Kang MD, Zachary Solomon, [facility], [facility], CNP, Fernandez Rosen, CNP, Ailyn Galvin, CNS, Celina Barragan, CNP, Cassie Humphreys, CNP, Lukasz Dasilva, CNP, Marta Purcell, CNP, Selam Roth, CNP, Liset Multani PA-C, Kamilah House, [facility], Sebastian Self, CNP, Nasima Lopez, CNP, Josefina Pope, CNP, Kalyani Nichols, CNP, Rhona Ram, CNP, [facility], [facility] [address]    Progress Note    3/9/2021    9:53 AM    Name:   Michael Cloud  MRN:     [id number]     Acct:      [de-identified]   Room:   [address] [place] [address] Day:  0  Admit Date:  3/7/2021 12:51 PM    PCP:   LINUS Maria CNP  Code Status:  Full Code    Subjective:     C/C:   Chief Complaint   Patient presents with   Aldon Homme     x2 today and thinks he \"blacks out\"     Interval History Status: not changed. He states this morning when he was up to the bathroom he had a swaying/rocking sensation. He said he did not necessarily feel lightheaded or dizzy he just fell like he was swaying. He said later, when working with PT/OT he did not have similar symptoms. He states he was supposed to meet with Dr. Bettina Muniz with neurology tomorrow related to episodes of falling, memory loss and recent CT results. BP lying 151/63 pulse 64  BP sitting 112/72 pulse 80  BP standing 90/56 pulse 76    Brief History:     Per previous documentation    Michael Cloud is a 79 y.o.  Non-/non  male who presents with Fall (x2 today and thinks he \"blacks out\")   and is admitted to the hospital for the management of Syncope and collapse.     Patient reports he started blacking out 3 to 4 years ago. He saw a neurologist at that time who thought he was having TIAs, recommended the patient try to eliminate stress from his life. 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 He says the blackout started up again about 3 months ago when he was diagnosed with diabetes. He says the doctors then thought it was due to low blood sugar. Over the last 2 weeks, his blackout episodes have been increasing. He says he really has no warning that he is going to pass out other than the tip of his ears get very hot and his knees get weak and then he passes out. He says he then wakes up on the ground.      Twelve-lead EKG was unremarkable. CT the head without contrast and CT of the cervical spine without contrast were completed and were unremarkable except for bilateral carotid vascular calcifications noted as well as lucencies within the C7 vertebral body favored to represent vertebral body hemangioma.     He was given IV fluids and admitted to the inpatient nursing unit for further observation and work-up of syncope. He remains positive for orthostatic drop in BP. Tunde Hsu His CT of head recommends MRI for further evaluation and bilateral carotid vascular calcifications. At this time I consulted Dr. Gabriel Dennis with neurology. Repeat BMP and CBC pending  Hemoglobin A1c 6.0  TSH 1.07  Echo completed showing normal left ventricular size and function. EF 60%. Mild aortic insufficiency      Review of Systems:     Review of Systems   Constitutional: Negative for chills, diaphoresis and fever. HENT: Negative for congestion. Eyes: Negative for visual disturbance. Respiratory: Negative for cough, chest tightness, shortness of breath and wheezing. Cardiovascular: Negative for chest pain, palpitations and leg swelling.    Gastrointestinal: Negative for abdominal pain, blood in stool, constipation, diarrhea, nausea and vomiting. Genitourinary: Negative for difficulty urinating. Neurological: Negative for dizziness, weakness, light-headedness, numbness and headaches. Feeling of swaying/rocking when up to the bathroom earlier this morning   All other systems reviewed and are negative. Medications: Allergies: Allergies   Allergen Reactions    Bee Venom Anaphylaxis    Pcn [Penicillins] Anaphylaxis       Current Meds:   Scheduled Meds:    insulin lispro  0-6 Units Subcutaneous TID WC    insulin lispro  0-3 Units Subcutaneous Nightly    traZODone  50 mg Oral Nightly    gabapentin  300 mg Oral TID    cetirizine  10 mg Oral Daily    midodrine  5 mg Oral TID WC    busPIRone  10 mg Oral TID    citalopram  20 mg Oral Daily    ezetimibe  10 mg Oral Nightly    famotidine  40 mg Oral QPM    levothyroxine  88 mcg Oral Daily    pantoprazole  40 mg Oral QAM AC    oxybutynin  10 mg Oral Daily    atorvastatin  40 mg Oral Daily    topiramate  50 mg Oral BID    meloxicam  15 mg Oral Daily    sodium chloride flush  10 mL Intravenous 2 times per day    [Held by provider] enoxaparin  40 mg Subcutaneous Daily     Continuous Infusions:    dextrose      sodium chloride 100 mL/hr at 03/08/21 1508     PRN Meds: glucose, dextrose, glucagon (rDNA), dextrose, fluticasone, sodium chloride flush, potassium chloride **OR** potassium alternative oral replacement **OR** potassium chloride, magnesium sulfate, nicotine, promethazine **OR** ondansetron, polyethylene glycol, acetaminophen **OR** acetaminophen, perflutren lipid microspheres    Data:     Past Medical History:   has a past medical history of Anxiety, Depression, Diabetes mellitus ([address] Ear infection, Headache(784.0), Hyperlipidemia, Insomnia, Osteoarthritis, PTSD (post-traumatic stress disorder), and Unspecified sleep apnea. Social History:   reports that he quit smoking about 23 years ago.  He has a 60.00 pack-year smoking history. He has never used smokeless tobacco. He reports previous alcohol use. He reports previous drug use. Drugs: Marijuana, Cocaine, and Other-see comments. Family History:   Family History   Problem Relation Age of Onset    Thyroid Cancer Mother     Migraines Mother     Cancer Mother     High Cholesterol Father     Heart Disease Father        Vitals:  /70   Pulse 69   Temp 98.2 °F (36.8 °C) (Oral)   Resp 20   Ht 5' 11\" (1.803 m)   Wt 190 lb (86.2 kg)   SpO2 95%   BMI 26.50 kg/m²   Temp (24hrs), Av.1 °F (36.7 °C), Min:97.5 °F (36.4 °C), Max:98.3 °F (36.8 °C)    Recent Labs     21  1613 21  0742   POCGLU 122* 106 90       I/O (24Hr):     Intake/Output Summary (Last 24 hours) at 3/9/2021 0953  Last data filed at 3/9/2021 0643  Gross per 24 hour   Intake 1479 ml   Output 3125 ml   Net -1646 ml       Labs:  Hematology:  Recent Labs     21  1320 21  0545   WBC 6.8 6.2   RBC 5.02 4.30*   HGB 15.5 13.3*   HCT 46.8 39.9*   MCV 93.3 92.8   MCH 31.0 30.9   MCHC 33.2 33.2   RDW 13.3 13.5   * 116*   MPV 10.1 10.4   DDIMER 5.11  --      Chemistry:  Recent Labs     21  1320 21  1719 21  0545     --   --  139   K 4.5  --   --  4.3     --   --  109*   CO2 26  --   --  25   GLUCOSE 128*  --   --  116*   BUN 21  --   --  21   CREATININE 1.15  --   --  1.08   MG  --   --   --  2.0   ANIONGAP 10  --   --  5*   LABGLOM >60  --   --  >60   GFRAA >60  --   --  >60   CALCIUM 9.3  --   --  8.5*   PROBNP  --  39  --   --    TROPHS 13 11 11  --      Recent Labs     21  1320 21  1719 21  0545 21  1613 213 21  0742   PROT 7.1  --   --   --   --   --    LABALBU 4.5  --   --   --   --   --    LABA1C  --   --  6.0  --   --   --    TSH  --  1.07  --   --   --   --    AST 12  --   --   --   --   --    ALT 12  --   --   --   --   --    ALKPHOS 80  --   --   -- --   --    BILITOT 0.68  --   --   --   --   --    BILIDIR 0.20  --   --   --   --   --    POCGLU  --   --   --  122* 106 90     ABG:No results found for: POCPH, PHART, PH, POCPCO2, LTV7GJR, PCO2, POCPO2, PO2ART, PO2, POCHCO3, HRX8EUL, HCO3, NBEA, PBEA, BEART, BE, THGBART, THB, BRS7CJF, RPBI8DEC, N0CBLDPD, O2SAT, FIO2  No results found for: SPECIAL  No results found for: CULTURE    Radiology:  Ct Head Wo Contrast    Result Date: 3/7/2021  CT head: 1. Mild atrophy. Mild-to-moderate chronic small vessel ischemic white matter disease. Atherosclerotic calcification of the vasculature. If there is clinical concern for acute on chronic ischemia, an MRI with diffusion-weighted imaging would be a more sensitive exam assuming there are no contraindications to MRI. 2. No acute intracranial hemorrhage or midline shift. CT of the cervical spine: 1. Mild-to-moderate degenerative changes within the cervical spine. 2. No acute vertebral body height loss or malalignment within the cervical spine. 3. Probable C7 vertebral body hemangioma. 4. Bilateral carotid vascular calcifications. 5. Moderate emphysema at the lung apices. Ct Cervical Spine Wo Contrast    Result Date: 3/7/2021  CT head: 1. Mild atrophy. Mild-to-moderate chronic small vessel ischemic white matter disease. Atherosclerotic calcification of the vasculature. If there is clinical concern for acute on chronic ischemia, an MRI with diffusion-weighted imaging would be a more sensitive exam assuming there are no contraindications to MRI. 2. No acute intracranial hemorrhage or midline shift. CT of the cervical spine: 1. Mild-to-moderate degenerative changes within the cervical spine. 2. No acute vertebral body height loss or malalignment within the cervical spine. 3. Probable C7 vertebral body hemangioma. 4. Bilateral carotid vascular calcifications. 5. Moderate emphysema at the lung apices.      Ct Chest Pulmonary Embolism W Contrast    Result Date: 3/7/2021  No evidence of pulmonary embolism or acute thoracic aortic abnormality. Mild emphysema. Minimal bibasilar atelectasis. Otherwise, clear lungs. Cardiomegaly. No pleural or pericardial effusion. Physical Examination:     Physical Exam  Vitals signs and nursing note reviewed. Constitutional:       Appearance: Normal appearance. HENT:      Mouth/Throat:      Mouth: Mucous membranes are dry. Eyes:      Extraocular Movements: Extraocular movements intact. Conjunctiva/sclera: Conjunctivae normal.      Pupils: Pupils are equal, round, and reactive to light. Cardiovascular:      Rate and Rhythm: Normal rate and regular rhythm. Pulses: Normal pulses. Heart sounds: Normal heart sounds. Pulmonary:      Effort: Pulmonary effort is normal.      Breath sounds: Normal breath sounds. Abdominal:      General: Bowel sounds are normal.      Palpations: Abdomen is soft. Musculoskeletal: Normal range of motion. Skin:     General: Skin is warm and dry. Capillary Refill: Capillary refill takes less than 2 seconds. Neurological:      General: No focal deficit present. Mental Status: He is alert and oriented to person, place, and time. Cranial Nerves: No cranial nerve deficit. Sensory: No sensory deficit. Motor: No weakness. Psychiatric:         Mood and Affect: Mood normal.         Assessment:     Hospital Problems           Last Modified POA    * (Principal) Syncope and collapse 3/8/2021 Yes    Anxiety 3/8/2021 Yes    Insomnia 3/8/2021 Yes    Gastroesophageal reflux disease 3/8/2021 Yes    Acquired hypothyroidism 3/8/2021 Yes    Orthostatic hypotension 3/9/2021 Yes          Plan:     Syncope and collapse: Continue IV hydration as ordered. Up with assist.  Orthostatics to shift. NISSA hose to knees on during the day, off at night. 2D echo. Check TSH. Orthostatics noted to be positive this morning. Midodrine then increased to 10 mg 3 times daily.   After 1 dose 10 mg midodrine, SBP noted be 160s. Midodrine then decreased back to 5 mg 3 times daily. Assess home meds to see if any of them have a side effect of hypotension or syncope. Fall precautions. Neuro consult related to CT findings  Anxiety: Continue buspirone.   Insomnia: Continue trazodone  GERD: Continue Pepcid nightly  Hypothyroidism: TSH within normal limits  Plan discharge pending neurology recommendations    LINUS Sanchez - CNP  3/9/2021  9:53 AM

## 2021-03-09 NOTE — PROGRESS NOTES
Occupational Therapy  Facility/Department: [facility] MED SURG ICU  Daily Treatment Note  NAME: Crispin Curry  : 1950  MRN: [id number]    Date of Service: 3/9/2021  Chief Complaint   Patient presents with    Fall     x2 today and thinks he \"blacks out\"       Discharge Recommendations:    No therapy recommended at discharge. OT Equipment Recommendations  Equipment Needed: Yes  Mobility Devices: ADL Assistive Devices  ADL Assistive Devices: Shower Chair with back    Assessment   Performance deficits / Impairments: Decreased functional mobility ; Decreased balance;Decreased safe awareness;Decreased high-level IADLs;Decreased endurance;Decreased ROM  Assessment: RN ok'd for eval - pt pleasant and agreeable for tx. Pt /70 and SPO2 97% upon seated at EOB. Pt would continue to benefit from skilled OT services during stay for activity promotion and tolerance, increased endurance and safety awareness to perform functional mobility/transfers and ADLs. Pt requires 24 hr assistance at this time for all functional activities. Prognosis: Good  OT Education: Energy Conservation;Precautions;Transfer Training;ADL Adaptive Strategies  Patient Education: Pt educated on and demo'd erikaning of Timmy Moseley, in bed with HOB elevated, CGA with increased time to complete. Pt educated on pursed lip breathing techniques for SOB management secondary to increased activity engagement. Pt educated on participation in UE exercises to increase endurance and activity tolerance for participation in daily activities. Pt educated on keeping eyes open and head up upon standing activity to maintain balance secondary to pt report of \"swaying\" like sensation while standing during tx session.   REQUIRES OT FOLLOW UP: Yes  Activity Tolerance  Activity Tolerance: Patient limited by fatigue  Activity Tolerance: Upon seated at EOB pt SPO2 97% and /70 - pt reports increased SOB noted to don [facility] and socks in bed with HOB elevated - Limits     Objective    ADL  Grooming: Increased time to complete;Stand by assistance(hand hygiene at sink w/RW)  LE Dressing: Increased time to complete;Contact guard assistance(seated in bed, HOB elevated to don B guerita hose and socks - CGA for assist with Zechariah Kaur)  Toileting: Increased time to complete;Supervision(standing at toilet for void in urinal)        Balance  Sitting Balance: Supervision(HOB elevated to don B guerita hose and socks)  Standing Balance: Stand by assistance(Supervision for toileting - SBA at sink for hand hygiene with RW; no evidence of LOB.)    Functional Mobility  Functional - Mobility Device: Rolling Walker  Activity: (bed > chair > bathroom > bed)  Assist Level: Stand by assistance    Toilet Transfers  Toilet - Technique: Ambulating  Equipment Used: (pt stood at toilet w/supervision for voiding in urinal. Upon completion of standing at toilet pt reports \"sort of felt like I was swaying\" and denies any dizziness - No evidence of LOB. Nursed notified of pt noted changed.)  Toilet Transfer: Supervision    Bed mobility  Supine to Sit: Stand by assistance  Sit to Supine: Stand by assistance  Scooting: Supervision     Transfers  Sit to stand: Stand by assistance  Stand to sit: Stand by assistance  Transfer Comments: Min VCs for safety awareness for use of RW for functional transfer      Cognition  Overall Cognitive Status: Exceptions  Safety Judgement: Decreased awareness of need for assistance;Decreased awareness of need for safety         Type of ROM/Therapeutic Exercise  Type of ROM/Therapeutic Exercise: AROM  Exercises  Shoulder Elevation: Bilateral 1x10 reps  Shoulder Flexion: Bilateral 1x10 reps; full ROM limited by pt reported arthritis  Shoulder ABduction: Bilateral 1x10 reps; full ROM limited by pt reported arthritis  Elbow Flexion: Bilateral 1x15 reps  Elbow Extension: Bilateral 1x15 reps  Wrist Flexion: Bilateral 1x15 reps  Wrist Extension: Bilateral 1x15 reps  Other: Pt performed UE ROM

## 2021-03-10 ENCOUNTER — TELEPHONE (OUTPATIENT)
Dept: PRIMARY CARE CLINIC | Age: 71
End: 2021-03-10

## 2021-03-10 ENCOUNTER — OFFICE VISIT (OUTPATIENT)
Dept: PRIMARY CARE CLINIC | Age: 71
End: 2021-03-10
Payer: MEDICARE

## 2021-03-10 VITALS
BODY MASS INDEX: 26.81 KG/M2 | OXYGEN SATURATION: 99 % | RESPIRATION RATE: 18 BRPM | WEIGHT: 192.2 LBS | SYSTOLIC BLOOD PRESSURE: 138 MMHG | DIASTOLIC BLOOD PRESSURE: 76 MMHG | HEART RATE: 73 BPM

## 2021-03-10 DIAGNOSIS — Z86.73 HX-TIA (TRANSIENT ISCHEMIC ATTACK): ICD-10-CM

## 2021-03-10 DIAGNOSIS — I65.23 CALCIFICATION OF BOTH CAROTID ARTERIES: ICD-10-CM

## 2021-03-10 DIAGNOSIS — E11.9 NEW ONSET TYPE 2 DIABETES MELLITUS (HCC): ICD-10-CM

## 2021-03-10 DIAGNOSIS — Z79.899 POLYPHARMACY: ICD-10-CM

## 2021-03-10 DIAGNOSIS — Z09 HOSPITAL DISCHARGE FOLLOW-UP: ICD-10-CM

## 2021-03-10 DIAGNOSIS — R29.6 FREQUENT FALLS: ICD-10-CM

## 2021-03-10 DIAGNOSIS — R55 SYNCOPE AND COLLAPSE: Primary | ICD-10-CM

## 2021-03-10 PROCEDURE — 1111F DSCHRG MED/CURRENT MED MERGE: CPT | Performed by: PHYSICIAN ASSISTANT

## 2021-03-10 PROCEDURE — 99495 TRANSJ CARE MGMT MOD F2F 14D: CPT | Performed by: PHYSICIAN ASSISTANT

## 2021-03-10 NOTE — TELEPHONE ENCOUNTER
[facility] Transitions Initial Follow Up Call    Outreach made within 2 business days of discharge: Yes    Patient: Emi Majano Patient : 1950   MRN: [id number]  Reason for Admission: There are no discharge diagnoses documented for the most recent discharge. Discharge Date: 3/9/21       Spoke with: Charbel Urbina (wife)    Discharge department/facility: [facility]    TCM Interactive Patient Contact:  Was patient able to fill all prescriptions: No: No medications prescribed at discharge  Was patient instructed to bring all medications to the follow-up visit: No: No medications prescribed at discharge  Is patient taking all medications as directed in the discharge summary?  No medications prescribed at discharge  Does patient understand their discharge instructions: Yes  Does patient have questions or concerns that need addressed prior to 7-14 day follow up office visit: no    Scheduled appointment with PCP within 7-14 days    Follow Up  Future Appointments   Date Time Provider Iggy Leger   3/10/2021  2:00 PM [place] [place] [facility]  [facility]   2021  1:40 PM LINUS Aldridge - CNP [place] [facility]       Esthela Gómez MA

## 2021-03-10 NOTE — PROGRESS NOTES
Post-Discharge Transitional Care Management Services or Hospital Follow Up      Jovita Toussaint   YOB: 1950    Date of Office Visit:  3/10/2021  Date of Hospital Admission: 3/7/21  Date of Hospital Discharge: 3/9/21  Risk of hospital readmission (high >=14%. Medium >=10%) :No data recorded    Care management risk score Rising risk (score 2-5) and Complex Care (Scores >=6): 2     Non face to face  following discharge, date last encounter closed (first attempt may have been earlier): 3/10/2021 10:29 AM    Call initiated 2 business days of discharge: Yes    Patient Active Problem List   Diagnosis    Hyperlipidemia    Anxiety    Insomnia    Osteoarthritis    Sleep apnea    Chronic midline low back pain without sciatica    Gastroesophageal reflux disease    Therapeutic opioid induced constipation    Acquired hypothyroidism    Chronic tension-type headache, not intractable    Hx-TIA (transient ischemic attack)    Fibromyalgia    Orthostatic hypotension    Diastolic dysfunction    Syncope and collapse    Benign hypertension    Depressive disorder    Post traumatic stress disorder       Allergies   Allergen Reactions    Bee Venom Anaphylaxis    Pcn [Penicillins] Anaphylaxis       Medications listed as ordered at the time of discharge from [facility]   [facility]   Home Medication Instructions KRYSTINA:    Printed on:03/12/21 8837   Medication Information                      blood glucose monitor strips  Test 2  times a day & as needed for symptoms of irregular blood glucose. Dispense sufficient amount for indicated testing frequency plus additional to accommodate PRN testing needs. blood glucose test strips (FREESTYLE LITE) strip  Use to test blood sugar twice daily and as needed for symptoms of irregular blood sugar             Blood Pressure Monitoring (BLOOD PRESS MONITOR/M-L CUFF) MISC  1 kit by Does not apply route daily.              busPIRone (BUSPAR) 10 MG tablet  Take 1 tablet by mouth 3 times daily             citalopram (CELEXA) 20 MG tablet  TAKE ONE TABLET BY MOUTH ONCE DAILY             ezetimibe (ZETIA) 10 MG tablet  TAKE 1 TABLET DAILY             famotidine (PEPCID) 40 MG tablet  Take 1 tablet by mouth every evening             fluticasone (FLONASE) 50 MCG/ACT nasal spray  1 spray by Each Nostril route daily as needed for Rhinitis or Allergies             gabapentin (NEURONTIN) 300 MG capsule  TAKE 1 CAPSULE FOUR TIMES A DAY             glucose monitoring kit (FREESTYLE) monitoring kit  Supply glucometer, lancets and testing strips best covered by insurance             Lancets MISC  Test glucose 3 times daily and as needed             levothyroxine (SYNTHROID) 88 MCG tablet  TAKE 1 TABLET DAILY             meloxicam (MOBIC) 15 MG tablet  Take 1 tablet by mouth daily             midodrine (PROAMATINE) 2.5 MG tablet  TAKE 1 TABLET THREE TIMES A DAY             MISC NATURAL PRODUCTS PO  Take 1 tablet by mouth daily as needed (gas) (Herbal Bean and Vegetable Gas Relief)             omeprazole (PRILOSEC) 40 MG delayed release capsule  Take 1 capsule by mouth daily             oxybutynin (DITROPAN XL) 10 MG extended release tablet  Take 1 tablet by mouth daily             simvastatin (ZOCOR) 80 MG tablet  TAKE 1 TABLET DAILY AT BEDTIME             temazepam (RESTORIL) 15 MG capsule  TAKE ONE CAPSULE BY MOUTH AT BEDTIME             topiramate (TOPAMAX) 50 MG tablet  Take 1 tablet by mouth 2 times daily             traMADol (ULTRAM) 50 MG tablet  Take 50 mg by mouth every 6 hours as needed for Pain (migraine).               traZODone (DESYREL) 100 MG tablet  Take 100 mg by mouth nightly                   Medications marked \"taking\" at this time  Outpatient Medications Marked as Taking for the 3/10/21 encounter (Office Visit) with Marques Torres PA-C   Medication Sig Dispense Refill    ezetimibe (ZETIA) 10 MG tablet TAKE 1 TABLET DAILY 90 tablet 3    blood glucose monitor strips Test 2  times a day & as needed for symptoms of irregular blood glucose. Dispense sufficient amount for indicated testing frequency plus additional to accommodate PRN testing needs.  180 strip 3    blood glucose test strips (FREESTYLE LITE) strip Use to test blood sugar twice daily and as needed for symptoms of irregular blood sugar 200 each 3    temazepam (RESTORIL) 15 MG capsule TAKE ONE CAPSULE BY MOUTH AT BEDTIME 90 capsule 1    levothyroxine (SYNTHROID) 88 MCG tablet TAKE 1 TABLET DAILY 90 tablet 3    oxybutynin (DITROPAN XL) 10 MG extended release tablet Take 1 tablet by mouth daily 90 tablet 3    Lancets MISC Test glucose 3 times daily and as needed 100 each 5    glucose monitoring kit (FREESTYLE) monitoring kit Supply glucometer, lancets and testing strips best covered by insurance 1 kit 0    fluticasone (FLONASE) 50 MCG/ACT nasal spray 1 spray by Each Nostril route daily as needed for Rhinitis or Allergies      MISC NATURAL PRODUCTS PO Take 1 tablet by mouth daily as needed (gas) (Herbal Bean and Vegetable Gas Relief)      traZODone (DESYREL) 100 MG tablet Take 100 mg by mouth nightly      gabapentin (NEURONTIN) 300 MG capsule TAKE 1 CAPSULE FOUR TIMES A  capsule 3    famotidine (PEPCID) 40 MG tablet Take 1 tablet by mouth every evening 90 tablet 3    simvastatin (ZOCOR) 80 MG tablet TAKE 1 TABLET DAILY AT BEDTIME 90 tablet 3    midodrine (PROAMATINE) 2.5 MG tablet TAKE 1 TABLET THREE TIMES A  tablet 3    meloxicam (MOBIC) 15 MG tablet Take 1 tablet by mouth daily 90 tablet 3    omeprazole (PRILOSEC) 40 MG delayed release capsule Take 1 capsule by mouth daily 90 capsule 3    topiramate (TOPAMAX) 50 MG tablet Take 1 tablet by mouth 2 times daily 180 tablet 3    citalopram (CELEXA) 20 MG tablet TAKE ONE TABLET BY MOUTH ONCE DAILY 90 tablet 3    busPIRone (BUSPAR) 10 MG tablet Take 1 tablet by mouth 3 times daily 270 tablet 3    Blood Pressure Monitoring (BLOOD pupils equal, round, and reactive to light, extraocular eye movements intact, conjunctivae normal  ENT: external ear and ear canal normal bilaterally, nose without deformity, nasal mucosa and turbinates normal without polyps  Neck: supple and non-tender without mass, no thyromegaly or thyroid nodules, no cervical lymphadenopathy  Pulmonary/Chest: clear to auscultation bilaterally- no wheezes, rales or rhonchi, normal air movement, no respiratory distress  Cardiovascular: normal rate, regular rhythm, normal S1 and S2, no murmurs, rubs, clicks, or gallops, distal pulses intact, no carotid bruits  Abdomen: soft, non-tender, non-distended, normal bowel sounds, no masses or organomegaly  Extremities: no cyanosis, clubbing or edema  Musculoskeletal: normal range of motion, no joint swelling, deformity or tenderness  Neurologic: reflexes normal and symmetric, no cranial nerve deficit, gait, coordination and speech normal    Assessment/Plan:  1. Syncope and collapse  - MS DISCHARGE MEDS RECONCILED W/ CURRENT OUTPATIENT MED LIST  - Arti Turcios DO, Cardiology, [facility]    - Referral to cardiology for work-up to further evaluate syncope and collapse. 2. New onset type 2 diabetes mellitus ([facility] [address]  - we discussed that his A1c is borderline. He should be able to maintain this with changes to diet including limiting sugars/carbs. He does not tolerate metformin well, GI upset. 3. Hx-TIA (transient ischemic attack)  - [address] [place] [address] [address], Bi Fleming DO, Cardiology, [facility]    4. Calcification of both carotid arteries  - VL DUP CAROTID BILATERAL; Future  - NANDO - Arti Walter DO, Cardiology, [facility]    5. Frequent falls  - External Referral To Physical Therapy  - We discussed balance therapy and overall strengthening. 6. Hospital discharge follow-up    7. Polypharmacy  - We had a long discussion about his medication list. He is on a few medications that can possibly contribute to hypotension and syncope.  He will discuss with his

## 2021-03-10 NOTE — PROGRESS NOTES
Pt discharged with all belongings, scripts and discharge paperwork. Follow up appointments and discharge instructions reviewed with pt and care giver. All question answered to satisfaction.      Patient remains waiting for spouse to pick pt up; report given to night shift RN Jesus Castaneda, who will assume care of patient until spouse arrives

## 2021-03-10 NOTE — PROGRESS NOTES
Spouse arrived to  pt. Pt discharged via wheelchair with all belongings, scripts and discharge paperwork. Follow up appointments and discharge instructions reviewed with pt and care giver. All question answered to satisfaction.

## 2021-03-12 ENCOUNTER — CARE COORDINATION (OUTPATIENT)
Dept: CARE COORDINATION | Age: 71
End: 2021-03-12

## 2021-03-12 ENCOUNTER — TELEPHONE (OUTPATIENT)
Dept: PRIMARY CARE CLINIC | Age: 71
End: 2021-03-12

## 2021-03-12 DIAGNOSIS — M79.89 LEG SWELLING: Primary | ICD-10-CM

## 2021-03-12 NOTE — CARE COORDINATION
Ambulatory Care Coordination Note  3/12/2021  CM Risk Score: 2  Charlson 10 Year Mortality Risk Score: 23%     ACC: Mark Mendoza, RN    Summary Note: States, has not blacked out, or fallen or felt dizzy since hes been home from the hospital   He has his walker, refusing to use it   Gets up slowly and pacing his activities. Sits and rests often when he feels tired   Reviewed home safety and fall prevention   States, drinking enough fluids and staying hydrated   Starting PT for balance training and strengthening next week   Reviewed compression stockings were ordered   Reports, BSs have been doing ok  Has lost 22 lbs within the past 2-3 months   Eeating a healthier balanced diet   Plans to get 1st COVID vaccine  Has cardiology appt next week   has meds on hand, taking as prescribed   encouraged contacting providers as needed with questions/ concerns   CM plan; review outcome of medical apps, progression with PT      Future Appointments   Date Time Provider Iggy Leger   3/16/2021  2:40 PM [facility], COVID-19 VACCINE SCHEDULE [address]   3/18/2021  1:00 PM [facility] [place] [facility]   4/9/2021  4:00 PM LINUS Shultz - CNP [facility]     General Assessment    Do you have any symptoms that are causing concern?: Yes  Progression since Onset: Gradually Improving  Reported Symptoms: Weakness             Care Coordination Interventions    Program Enrollment: Complex Care  Referral from Primary Care Provider: No  Suggested Interventions and Community Resources  Diabetes Education: Declined  Fall Risk Prevention: Completed  Meals on Wheels: Completed  Physical Therapy: Completed  Registered Dietician: Completed  Social Work: Completed  Zone Management Tools: Completed (Comment: DM)         Goals Addressed                 This Visit's Progress     Conditions and Symptoms   Improving     I will schedule office visits, as directed by my provider.   I will notify my provider of any barriers to my plan of care. I will follow my Zone Management tool to seek urgent or emergent care. I will notify my provider of any symptoms that indicate a worsening of my condition. Barriers: newly Dx DM, overwhelmed by complexity of regimen and lack of education  Plan for overcoming my barriers: education and care coordination   Confidence: 7/10  Anticipated Goal Completion Date: 2/24/2021        Update;  4/26/21    Update; patient continues to work with CM                 Prior to Admission medications    Medication Sig Start Date End Date Taking? Authorizing Provider   Elastic Bandages & Supports (JOBST KNEE HIGH COMPRESSION SM) MISC Measure for appropriate sizing 3/12/21   LINUS Shultz CNP   ezetimibe (ZETIA) 10 MG tablet TAKE 1 TABLET DAILY 2/8/21   LINUS Ray CNP   blood glucose monitor strips Test 2  times a day & as needed for symptoms of irregular blood glucose. Dispense sufficient amount for indicated testing frequency plus additional to accommodate PRN testing needs.  2/5/21   LINUS Pinon CNP   blood glucose test strips (FREESTYLE LITE) strip Use to test blood sugar twice daily and as needed for symptoms of irregular blood sugar 2/5/21   LINUS Pinon CNP   temazepam (RESTORIL) 15 MG capsule TAKE ONE CAPSULE BY MOUTH AT BEDTIME 1/25/21 5/26/21  LINUS Shultz CNP   levothyroxine (SYNTHROID) 88 MCG tablet TAKE 1 TABLET DAILY 1/25/21   LINUS Shultz CNP   oxybutynin (DITROPAN XL) 10 MG extended release tablet Take 1 tablet by mouth daily 1/11/21   LINUS Shultz CNP   Lancets MISC Test glucose 3 times daily and as needed 1/4/21   LINUS Shultz CNP   glucose monitoring kit (FREESTYLE) monitoring kit Supply glucometer, lancets and testing strips best covered by insurance 12/31/20   LINUS Shultz CNP   fluticasone (FLONASE) 50 MCG/ACT nasal spray 1 spray by Each Nostril route daily as

## 2021-03-12 NOTE — TELEPHONE ENCOUNTER
Patients wife Aldo Gutierrez called requesting an order be sent to [facility] for patient to get compression stockings. She would like a call back when order has been faxed.

## 2021-03-15 NOTE — TELEPHONE ENCOUNTER
[employer] calling stating they need a compression on the stockings order that was sent.  Please advise and we can call Mitch Washington back at [facility]

## 2021-03-16 ENCOUNTER — IMMUNIZATION (OUTPATIENT)
Dept: PRIMARY CARE CLINIC | Age: 71
End: 2021-03-16
Payer: MEDICARE

## 2021-03-16 PROCEDURE — 0001A PR IMM ADMN SARSCOV2 30MCG/0.3ML DIL RECON 1ST DOSE: CPT | Performed by: INTERNAL MEDICINE

## 2021-03-16 PROCEDURE — 91300 COVID-19, PFIZER VACCINE 30MCG/0.3ML DOSE: CPT | Performed by: INTERNAL MEDICINE

## 2021-03-18 ENCOUNTER — CARE COORDINATION (OUTPATIENT)
Dept: CARE COORDINATION | Age: 71
End: 2021-03-18

## 2021-03-18 ENCOUNTER — HOSPITAL ENCOUNTER (OUTPATIENT)
Dept: VASCULAR LAB | Age: 71
Discharge: HOME | End: 2021-03-18
Payer: MEDICARE

## 2021-03-18 DIAGNOSIS — I65.23 CALCIFICATION OF BOTH CAROTID ARTERIES: ICD-10-CM

## 2021-03-18 PROCEDURE — 93880 EXTRACRANIAL BILAT STUDY: CPT

## 2021-03-19 NOTE — CARE COORDINATION
Patient goals reviewed:  1. Reviewed having set meal times patient's wife, continue to work on this, eating 2-3 meals/day.  Quick/easy breakfast/dinner suggestions reviewed.  Goal is 3 meals daily spaced every 4-5 hours. 2. Reviewed measuring out portions using a measuring cup or hand to estimate a serving size. Encouraged patient to limit carb intake to 60gms/meal, 15-30gms/snack.  Reviewed low carb snacking. 3. Reviewed plate method, encouraged patient to increase intake of non-starchy vegetables.  Goal is 1/2 of  plate to be non-starchy vegetables, 1/4 lean protein, 1/4 carbs. Foods from each category reviewed along with what a serving size is.    4. Reviewed avoiding/limiting sweets and sweetened drinks, patient has cut out sugary drinks. Reviewed sugar free alternatives to sweets- sugar free jello, pudding, ect.   3/19/21-patient doing very well, all BS <150, A1c is down to 6.0 and he has lost over 20 pounds in the last 3 months from improving his diet per patient's wife. Patient and wife have no further nutrition questions. Provided with contact information to call with questions. Will remove patient from panel.   ROSSI Madden

## 2021-03-24 ENCOUNTER — TELEPHONE (OUTPATIENT)
Dept: PRIMARY CARE CLINIC | Age: 71
End: 2021-03-24

## 2021-03-24 NOTE — TELEPHONE ENCOUNTER
Pt wife called stating that someone called her and that someone came to the house twice, pt wife states they were with his PCP's office. Writer advised her that office does not have nurses or physicians that make house calls. Writer asked pt wife if she had the number to the nurse who came to the house or if pt has a  [address] that comes out to the house, pt wife states that she did not know. Pt wife asked if it could be the [facility] and writer advised that there is no record that shows they were going to in pt chart. Writer advised pt wife to contact [facility] to check if they sent someone to the house. Pt wife verbalized understanding.

## 2021-03-30 DIAGNOSIS — F41.9 ANXIETY: ICD-10-CM

## 2021-03-30 DIAGNOSIS — F51.01 PRIMARY INSOMNIA: ICD-10-CM

## 2021-03-30 RX ORDER — BUSPIRONE HYDROCHLORIDE 10 MG/1
TABLET ORAL
Qty: 270 TABLET | Refills: 3 | Status: SHIPPED | OUTPATIENT
Start: 2021-03-30 | End: 2022-02-09

## 2021-03-31 ENCOUNTER — CARE COORDINATION (OUTPATIENT)
Dept: CARE COORDINATION | Age: 71
End: 2021-03-31

## 2021-03-31 NOTE — CARE COORDINATION
Ambulatory Care Coordination Note  3/31/2021  CM Risk Score: 2  Charlson 10 Year Mortality Risk Score: 23%     ACC: Damion Ruiz, RN    Summary Note: outreach to review care management needs.  LVM and provided contact information        Future Appointments   Date Time Provider Iggy Leger   4/6/2021  3:20 PM [facility] COVID, [address] [address]   4/9/2021  4:00 PM LINUS Ramon - CNP [facility]  [address] [facility]

## 2021-04-06 ENCOUNTER — CARE COORDINATION (OUTPATIENT)
Dept: CARE COORDINATION | Age: 71
End: 2021-04-06

## 2021-04-06 ENCOUNTER — IMMUNIZATION (OUTPATIENT)
Dept: PRIMARY CARE CLINIC | Age: 71
End: 2021-04-06
Payer: MEDICARE

## 2021-04-06 PROCEDURE — 91300 COVID-19, PFIZER VACCINE 30MCG/0.3ML DOSE: CPT | Performed by: INTERNAL MEDICINE

## 2021-04-06 PROCEDURE — 0002A COVID-19, PFIZER VACCINE 30MCG/0.3ML DOSE: CPT | Performed by: INTERNAL MEDICINE

## 2021-04-06 NOTE — LETTER
[facility]  [address]  Phone: [phone]  Fax: [address] [facility] LINUS Spence CNP        May 19, 2020     Patient: Malcom Gaucher   YOB: 1950   Date of Visit: 5/19/2020       To Whom It May Concern: It is my medical opinion that Denis Esposito be excused from jury duty. He has multiple chronic conditions that prevent him from being able to sit for prolonged periods of time. If you have any questions or concerns, please don't hesitate to call.     Sincerely,        Norman Rowe, LINUS - KELLIE yesterday

## 2021-04-12 ENCOUNTER — OFFICE VISIT (OUTPATIENT)
Dept: PRIMARY CARE CLINIC | Age: 71
End: 2021-04-12

## 2021-04-12 VITALS
RESPIRATION RATE: 18 BRPM | OXYGEN SATURATION: 98 % | WEIGHT: 189.4 LBS | DIASTOLIC BLOOD PRESSURE: 78 MMHG | SYSTOLIC BLOOD PRESSURE: 134 MMHG | BODY MASS INDEX: 26.42 KG/M2 | HEART RATE: 72 BPM

## 2021-04-12 DIAGNOSIS — Z86.73 HX-TIA (TRANSIENT ISCHEMIC ATTACK): ICD-10-CM

## 2021-04-12 DIAGNOSIS — M54.50 CHRONIC MIDLINE LOW BACK PAIN WITHOUT SCIATICA: ICD-10-CM

## 2021-04-12 DIAGNOSIS — R55 SYNCOPE AND COLLAPSE: ICD-10-CM

## 2021-04-12 DIAGNOSIS — F41.9 ANXIETY: ICD-10-CM

## 2021-04-12 DIAGNOSIS — F51.01 PRIMARY INSOMNIA: ICD-10-CM

## 2021-04-12 DIAGNOSIS — E11.9 NEW ONSET TYPE 2 DIABETES MELLITUS (HCC): Primary | ICD-10-CM

## 2021-04-12 DIAGNOSIS — Z79.899 POLYPHARMACY: ICD-10-CM

## 2021-04-12 DIAGNOSIS — R42 DIZZINESS: ICD-10-CM

## 2021-04-12 DIAGNOSIS — G89.29 CHRONIC MIDLINE LOW BACK PAIN WITHOUT SCIATICA: ICD-10-CM

## 2021-04-12 DIAGNOSIS — E03.9 ACQUIRED HYPOTHYROIDISM: ICD-10-CM

## 2021-04-12 PROCEDURE — 99214 OFFICE O/P EST MOD 30 MIN: CPT | Performed by: NURSE PRACTITIONER

## 2021-04-12 RX ORDER — GABAPENTIN 300 MG/1
300 CAPSULE ORAL 2 TIMES DAILY
Qty: 360 CAPSULE | Refills: 3
Start: 2021-04-12 | End: 2021-06-21 | Stop reason: ALTCHOICE

## 2021-04-12 RX ORDER — CITALOPRAM 20 MG/1
TABLET ORAL
Qty: 90 TABLET | Refills: 3 | Status: SHIPPED
Start: 2021-04-12 | End: 2021-04-28

## 2021-04-12 RX ORDER — TOPIRAMATE 50 MG/1
50 TABLET, FILM COATED ORAL DAILY
Qty: 180 TABLET | Refills: 3 | Status: SHIPPED
Start: 2021-04-12 | End: 2021-04-28

## 2021-04-12 ASSESSMENT — ENCOUNTER SYMPTOMS
DIARRHEA: 0
CONSTIPATION: 0
BLOOD IN STOOL: 0
SHORTNESS OF BREATH: 0
SORE THROAT: 0
NAUSEA: 0
ABDOMINAL PAIN: 0
TROUBLE SWALLOWING: 0
WHEEZING: 0
VOMITING: 0
SINUS PRESSURE: 0
COUGH: 0

## 2021-04-12 NOTE — PROGRESS NOTES
[address] PRIMARY CARE  [address] [facility] [address]  [address] [address]  Dept: [phone]  Dept Fax: [phone]    Najma Guerin is a 79 y.o. male who presentstoday for his medical conditions/complaints as noted below.   Najma Guerin is c/o of  Chief Complaint   Patient presents with    3 Month Follow-Up         HPI:     Presents today with his wife for follow up  He has been through several health changes recently  Had a hospital stay back in March  Since that time he reports no further falls but he remains having some issues with dizziness with position changes or when he walks around for a prolonged time  They report he wore a cardiac monitor, just turned into cardiology office today  They do not have a follow-up appointment yet scheduled    He has been off Metformin as reacted adversely when he initially started several months ago  Reports glucose levels at home range   Has low sugar sx if glucose less than 130, states he feels best in the 130s    They are both concerned about the multiple medications that he takes and he is willing to make several changes if appropriate  They present today with all of his medication bottles that he is using currently  He is on 2 different agents for sleep including temazepam, he states he \"gets knocked out\"  Wife reports he often will get up during the night and walk around the house, patient states he does not seem to remember this when he wakes up in the morning      Hemoglobin A1C (%)   Date Value   2021 6.0   2020 6.7 (H)   2017 5.9             ( goal A1C is < 7)   No results found for: LABMICR  LDL Cholesterol (mg/dL)   Date Value   2020 33   2020 48   2018 42     LDL Calculated (mg/dL)   Date Value   2016 49   2014 54       (goal LDL is <100)   AST (U/L)   Date Value   2021 12     ALT (U/L)   Date Value   2021 12     BUN (mg/dL)   Date Value extended release tablet Take 1 tablet by mouth daily 90 tablet 3    Lancets MISC Test glucose 3 times daily and as needed 100 each 5    glucose monitoring kit (FREESTYLE) monitoring kit Supply glucometer, lancets and testing strips best covered by insurance 1 kit 0    fluticasone (FLONASE) 50 MCG/ACT nasal spray 1 spray by Each Nostril route daily as needed for Rhinitis or Allergies      MISC NATURAL PRODUCTS PO Take 1 tablet by mouth daily as needed (gas) (Herbal Bean and Vegetable Gas Relief)      traZODone (DESYREL) 100 MG tablet Take 100 mg by mouth nightly      gabapentin (NEURONTIN) 300 MG capsule TAKE 1 CAPSULE FOUR TIMES A  capsule 3    famotidine (PEPCID) 40 MG tablet Take 1 tablet by mouth every evening 90 tablet 3    simvastatin (ZOCOR) 80 MG tablet TAKE 1 TABLET DAILY AT BEDTIME 90 tablet 3    midodrine (PROAMATINE) 2.5 MG tablet TAKE 1 TABLET THREE TIMES A  tablet 3    meloxicam (MOBIC) 15 MG tablet Take 1 tablet by mouth daily 90 tablet 3    omeprazole (PRILOSEC) 40 MG delayed release capsule Take 1 capsule by mouth daily 90 capsule 3    topiramate (TOPAMAX) 50 MG tablet Take 1 tablet by mouth 2 times daily 180 tablet 3    citalopram (CELEXA) 20 MG tablet TAKE ONE TABLET BY MOUTH ONCE DAILY 90 tablet 3    Blood Pressure Monitoring (BLOOD PRESS MONITOR/M-L CUFF) MISC 1 kit by Does not apply route daily. 1 each 0     No current facility-administered medications for this visit.       Allergies   Allergen Reactions    Bee Venom Anaphylaxis    Pcn [Penicillins] Anaphylaxis       Health Maintenance   Topic Date Due    AAA screen  Never done    DTaP/Tdap/Td vaccine (1 - Tdap) Never done    Shingles Vaccine (2 of 3) 12/22/2017    Annual Wellness Visit (AWV)  07/22/2021 (Originally 5/29/2019)    Lipid screen  11/20/2021    TSH testing  03/07/2022    A1C test (Diabetic or Prediabetic)  03/08/2022    Potassium monitoring  03/09/2022    Creatinine monitoring  03/09/2022    Colon cancer screen colonoscopy  06/04/2024    Flu vaccine  Completed    Pneumococcal 65+ years Vaccine  Completed    COVID-19 Vaccine  Completed    Hepatitis C screen  Completed    Hepatitis A vaccine  Aged Out    Hib vaccine  Aged Out    Meningococcal (ACWY) vaccine  Aged Out       Subjective:      Review of Systems   Constitutional: Negative for activity change, appetite change, chills, fatigue, fever and unexpected weight change. HENT: Negative for congestion, ear pain, hearing loss, sinus pressure, sore throat and trouble swallowing. Eyes: Negative for visual disturbance. Respiratory: Negative for cough, shortness of breath and wheezing. Cardiovascular: Negative for chest pain, palpitations and leg swelling. Gastrointestinal: Negative for abdominal pain, blood in stool, constipation, diarrhea, nausea and vomiting. Endocrine: Negative for cold intolerance, heat intolerance, polydipsia, polyphagia and polyuria. Genitourinary: Negative for difficulty urinating, frequency, hematuria and urgency. Musculoskeletal: Positive for arthralgias and gait problem (cane). Negative for myalgias. Skin: Negative for rash. Allergic/Immunologic: Negative for environmental allergies. Neurological: Positive for light-headedness. Negative for dizziness, weakness and headaches. Psychiatric/Behavioral: Negative for confusion. The patient is not nervous/anxious. Objective:     Physical Exam  Constitutional:       Appearance: He is well-developed. HENT:      Head: Normocephalic. Eyes:      Conjunctiva/sclera: Conjunctivae normal.      Pupils: Pupils are equal, round, and reactive to light. Neck:      Musculoskeletal: Normal range of motion. Cardiovascular:      Rate and Rhythm: Normal rate and regular rhythm. Heart sounds: Normal heart sounds. No murmur. Pulmonary:      Effort: Pulmonary effort is normal.      Breath sounds: Normal breath sounds. No wheezing.    Abdominal: General: Bowel sounds are normal. There is no distension. Palpations: Abdomen is soft. Musculoskeletal: Normal range of motion. Skin:     General: Skin is warm and dry. Neurological:      Mental Status: He is alert and oriented to person, place, and time. Psychiatric:         Behavior: Behavior normal.         Thought Content: Thought content normal.         Judgment: Judgment normal.       /78   Pulse 72   Resp 18   Wt 189 lb 6.4 oz (85.9 kg)   SpO2 98%   BMI 26.42 kg/m²     Assessment:       Diagnosis Orders   1. New onset type 2 diabetes mellitus ([address]     2. Syncope and collapse     3. Primary insomnia     4. Anxiety     5. Polypharmacy     6. Dizziness     7. Acquired hypothyroidism               Plan:      Return in about 2 months (around 6/12/2021) for diabetes check, hypertension check, depression/anxiety. Diabetes-A1c stable with diet management, reviewed appropriate choices, advised normal glucose levels should be less than 110. Encouraged regular meals to avoid symptoms of hypoglycemia  Syncope and collapse-has not had any further falls since his hospital stay in March, he plans to follow-up with cardiology to discuss results of monitor that he just completed. Provided with phone number for cardiologist office to schedule appointment ASAP  Insomnia, anxiety, polypharmacy-lengthy discussion and review of medications. We will start with the following changes: Stop restoril  decrase celexa to half tab/10 mg  Decrease topamax to daily  Gabapentin decrease to 2 times a day then daily as tolerated  Strongly encouraged to call if experiences any difficulty with decreasing his dosages and/or stopping medications.   They both appear to understand, provided with written instructions  We will continue trazodone for sleep, will consider decreasing dosage at his next visit  Hypothyroidism-has been stable on current dose levothyroxine       Patient given educational materials - see patient instructions. Discussed use, benefit, and side effects of prescribed medications. All patientquestions answered. Pt voiced understanding. Reviewed health maintenance. Instructedto continue current medications, diet and exercise. Patient agreed with treatmentplan. Follow up as directed.      Electronicallysigned by LINUS Capellan CNP on 4/12/2021 at 5:53 PM

## 2021-04-14 ENCOUNTER — CARE COORDINATION (OUTPATIENT)
Dept: CARE COORDINATION | Age: 71
End: 2021-04-14

## 2021-04-14 NOTE — CARE COORDINATION
Ambulatory Care Coordination Note  4/14/2021  CM Risk Score: 2  Charlson 10 Year Mortality Risk Score: 23%     ACC: Mavis South, RN    Summary Note: Spoke to patient, feeling ok and tolerating med changes since OV 2 days ago   States, feels less lightheaded in the morning   He understands to call the office if hes having any difficulties   No recent falls   Reviewed home safety and fall prevention   States, has been eating a healthy diet   BSs ranging less than 150  No s/s of low BS   plans to follow up with cardiology   taking meds as prescribed  encouraged contacting providers as needed with any questions/ concerns  CM plan; review outcome of medical appts         Care Coordination Interventions    Program Enrollment: Complex Care  Referral from Primary Care Provider: No  Suggested Interventions and Community Resources  Diabetes Education: Declined  Fall Risk Prevention: Completed  Meals on Wheels: Completed  Physical Therapy: Completed  Registered Dietician: Completed  Social Work: Completed  Zone Management Tools: Completed (Comment: DM)         Goals Addressed                 This Visit's Progress     Conditions and Symptoms   Improving     I will schedule office visits, as directed by my provider. I will notify my provider of any barriers to my plan of care. I will follow my Zone Management tool to seek urgent or emergent care. I will notify my provider of any symptoms that indicate a worsening of my condition. Barriers: newly Dx DM, overwhelmed by complexity of regimen and lack of education  Plan for overcoming my barriers: education and care coordination   Confidence: 7/10  Anticipated Goal Completion Date: 2/24/2021        Update;  4/26/21    Update; patient continues to work with CM                 Prior to Admission medications    Medication Sig Start Date End Date Taking?  Authorizing Provider   citalopram (CELEXA) 20 MG tablet TAKE ONE HALF TABLET BY MOUTH ONCE DAILY 4/12/21   Yulia CAZARES LINUS Gurrola CNP   gabapentin (NEURONTIN) 300 MG capsule Take 1 capsule by mouth 2 times daily. 4/12/21 4/12/22  Celeste LINUS Germain CNP   topiramate (TOPAMAX) 50 MG tablet Take 1 tablet by mouth daily 4/12/21   [facility], APRN - CNP   busPIRone (BUSPAR) 10 MG tablet TAKE 1 TABLET THREE TIMES A DAY 3/30/21   Celeste LINUS Germain CNP   Elastic Bandages & Supports (JOBST KNEE HIGH COMPRESSION SM) [address] [facility] Measure for appropriate sizing 3/12/21   [facility]esteQu[facility], APRN - CNP   ezetimibe (ZETIA) 10 MG tablet TAKE 1 TABLET DAILY 2/8/21   LINUS Cullen CNP   blood glucose monitor strips Test 2  times a day & as needed for symptoms of irregular blood glucose. Dispense sufficient amount for indicated testing frequency plus additional to accommodate PRN testing needs.  2/5/21   LINUS Nation CNP   blood glucose test strips (FREESTYLE LITE) strip Use to test blood sugar twice daily and as needed for symptoms of irregular blood sugar 2/5/21   LINUS Nation CNP   levothyroxine (SYNTHROID) 88 MCG tablet TAKE 1 TABLET DAILY 1/25/21   [facility], APRN - CNP   oxybutynin (DITROPAN XL) 10 MG extended release tablet Take 1 tablet by mouth daily 1/11/21   Celeste LINUS Germain CNP   Lancets MISC Test glucose 3 times daily and as needed 1/4/21   Celeste LINUS Germain CNP   glucose monitoring kit (FREESTYLE) monitoring kit Supply glucometer, lancets and testing strips best covered by insurance 12/31/20   Celeste LINUS Germain CNP   fluticasone (FLONASE) 50 MCG/ACT nasal spray 1 spray by Each Nostril route daily as needed for Rhinitis or Allergies    Historical Provider, MD   MISC NATURAL PRODUCTS PO Take 1 tablet by mouth daily as needed (gas) (Herbal Bean and Vegetable Gas Relief)    Historical Provider, MD   traZODone (DESYREL) 100 MG tablet Take 100 mg by mouth nightly 10/1/20   Historical Provider, MD   famotidine (PEPCID) 40 MG tablet Take 1 tablet by mouth every evening 9/28/20   LINUS Khan CNP   simvastatin (ZOCOR) 80 MG tablet TAKE 1 TABLET DAILY AT BEDTIME 9/21/20   LINUS Khan CNP   midodrine (PROAMATINE) 2.5 MG tablet TAKE 1 TABLET THREE TIMES A DAY 9/21/20   Roslyn Silva MD   meloxicam [facility]) 15 MG tablet Take 1 tablet by mouth daily 7/21/20   LINUS Khan CNP   omeprazole (PRILOSEC) 40 MG delayed release capsule Take 1 capsule by mouth daily 6/3/20   LINUS Khan CNP   Blood Pressure Monitoring (BLOOD PRESS MONITOR/M-L CUFF) MISC 1 kit by Does not apply route daily.  4/30/14   Marium Kendall,        Future Appointments   Date Time Provider Iggy Ellisi   6/14/2021  3:20 PM LINUS Khan CNP [facility] Yajaira Carmichael

## 2021-04-28 ENCOUNTER — CARE COORDINATION (OUTPATIENT)
Dept: CARE COORDINATION | Age: 71
End: 2021-04-28

## 2021-04-28 DIAGNOSIS — F41.9 ANXIETY: ICD-10-CM

## 2021-04-28 DIAGNOSIS — Z86.73 HX-TIA (TRANSIENT ISCHEMIC ATTACK): ICD-10-CM

## 2021-04-28 RX ORDER — CITALOPRAM 20 MG/1
TABLET ORAL
Qty: 90 TABLET | Refills: 3 | Status: SHIPPED | OUTPATIENT
Start: 2021-04-28 | End: 2021-06-21 | Stop reason: SDUPTHER

## 2021-04-28 RX ORDER — TOPIRAMATE 50 MG/1
TABLET, FILM COATED ORAL
Qty: 180 TABLET | Refills: 3 | Status: SHIPPED | OUTPATIENT
Start: 2021-04-28 | End: 2021-06-21 | Stop reason: ALTCHOICE

## 2021-04-28 NOTE — CARE COORDINATION
Ambulatory Care Coordination Note  4/28/2021  CM Risk Score: 2  Charlson 10 Year Mortality Risk Score: 23%     ACC: Jacob Fitzpatrick, RN    Summary Note: outreach to review care management needs.  LVM and provided contact information

## 2021-05-06 ENCOUNTER — CARE COORDINATION (OUTPATIENT)
Dept: CARE COORDINATION | Age: 71
End: 2021-05-06

## 2021-05-06 NOTE — CARE COORDINATION
Ambulatory Care Coordination Note  5/6/2021  CM Risk Score: 2  Charlson 10 Year Mortality Risk Score: 23%     ACC: Mavis South, RN    Summary Note: outreach to review care management needs.  LVM and provided call back information

## 2021-05-13 ENCOUNTER — TELEPHONE (OUTPATIENT)
Dept: PRIMARY CARE CLINIC | Age: 71
End: 2021-05-13

## 2021-05-13 ENCOUNTER — CARE COORDINATION (OUTPATIENT)
Dept: CARE COORDINATION | Age: 71
End: 2021-05-13

## 2021-05-13 NOTE — CARE COORDINATION
Ambulatory Care Coordination Note  5/13/2021  CM Risk Score: 2  Charlson 10 Year Mortality Risk Score: 23%     ACC: Ana Payne, RN    Summary Note: Spoke to wife with patient   A1C; 6.1  Reports, following diabetic diet and overall BSs have improved  Still has some episodes of feeling lightheaded in the morning   Has less frequent falls  But reports, he did fall this weekend, feels ok. Refuses ED   Reviewed home safety and fall prevention  Reports, has been taking meds as prescribed   Will contact providers as needed with changes/ questions/ concerns  ACM episode opened 11/24/2020  Reviewed care management graduation, voiced understanding    Future Appointments   Date Time Provider Iggy Leger   6/14/2021  3:20 PM Karla Gaston, APRN - CNP [id number]       Diabetes Assessment    Medic Alert ID: No  Meal Planning: Calorie counting   How often do you test your blood sugar?: Other (Comment: tests 2 times daily. morning FBS and before dinner )   Do you have barriers with adherence to non-pharmacologic self-management interventions?  (Nutrition/Exercise/Self-Monitoring): Yes   Have you ever had to go to the ED for symptoms of low blood sugar?: No       No patient-reported symptoms   Do you have hyperglycemia symptoms?: No   Do you have hypoglycemia symptoms?: No       and   General Assessment    Do you have any symptoms that are causing concern?: No             Care Coordination Interventions    Program Enrollment: Complex Care  Referral from Primary Care Provider: No  Suggested Interventions and Community Resources  Diabetes Education: Declined  Fall Risk Prevention: Completed  Meals on Wheels: Completed  Physical Therapy: Completed  Registered Dietician: Completed  Social Work: Completed  Zone Management Tools: Completed (Comment: DM)         Goals Addressed                 This Visit's Progress     COMPLETED: Conditions and Symptoms   On track     I will schedule office visits, as directed by my provider. I will notify my provider of any barriers to my plan of care. I will follow my Zone Management tool to seek urgent or emergent care. I will notify my provider of any symptoms that indicate a worsening of my condition. Barriers: newly Dx DM, overwhelmed by complexity of regimen and lack of education  Plan for overcoming my barriers: education and care coordination   Confidence: 7/10  Anticipated Goal Completion Date: 2/24/2021        Update;  4/26/21    Update; patient continues to work with CM                 Prior to Admission medications    Medication Sig Start Date End Date Taking? Authorizing Provider   citalopram (CELEXA) 20 MG tablet TAKE 1 TABLET DAILY 4/28/21   Ephriam Sieving, APRN - CNP   topiramate (TOPAMAX) 50 MG tablet TAKE 1 TABLET TWICE A DAY 4/28/21   LINUS Roberto CNP   gabapentin (NEURONTIN) 300 MG capsule Take 1 capsule by mouth 2 times daily. 4/12/21 4/12/22  Ephriam Sieving, APRN - CNP   busPIRone (BUSPAR) 10 MG tablet TAKE 1 TABLET THREE TIMES A DAY 3/30/21   Ephriam Sieving, APRN - CNP   Elastic Bandages & Supports (JOBST KNEE HIGH COMPRESSION SM) [address] [facility] Measure for appropriate sizing 3/12/21   Gurwinderriam LINUS Hannon CNP   ezetimibe (ZETIA) 10 MG tablet TAKE 1 TABLET DAILY 2/8/21   LINUS Horowitz CNP   blood glucose monitor strips Test 2  times a day & as needed for symptoms of irregular blood glucose. Dispense sufficient amount for indicated testing frequency plus additional to accommodate PRN testing needs.  2/5/21   LINUS Dubose CNP   blood glucose test strips (FREESTYLE LITE) strip Use to test blood sugar twice daily and as needed for symptoms of irregular blood sugar 2/5/21   LINUS Dubose CNP   levothyroxine (SYNTHROID) 88 MCG tablet TAKE 1 TABLET DAILY 1/25/21   Gurwinderriam SieLINUS huffman CNP   oxybutynin (DITROPAN XL) 10 MG extended release tablet Take 1 tablet by mouth daily 1/11/21   Chandrikaam LINUS Hannon CNP Lancets MISC Test glucose 3 times daily and as needed 1/4/21   LINUS Jeter CNP   glucose monitoring kit (FREESTYLE) monitoring kit Supply glucometer, lancets and testing strips best covered by insurance 12/31/20   LINUS Reed CNP   fluticasone (FLONASE) 50 MCG/ACT nasal spray 1 spray by Each Nostril route daily as needed for Rhinitis or Allergies    Historical Provider, MD   MISC NATURAL PRODUCTS PO Take 1 tablet by mouth daily as needed (gas) (Herbal Bean and Vegetable Gas Relief)    Historical Provider, MD   traZODone (DESYREL) 100 MG tablet Take 100 mg by mouth nightly 10/1/20   Historical Provider, MD   famotidine (PEPCID) 40 MG tablet Take 1 tablet by mouth every evening 9/28/20   LINUS Jeter CNP   simvastatin (ZOCOR) 80 MG tablet TAKE 1 TABLET DAILY AT BEDTIME 9/21/20   LINUS Jeter CNP   midodrine (PROAMATINE) 2.5 MG tablet TAKE 1 TABLET THREE TIMES A DAY 9/21/20   Nandini Vazquez MD   meloxicam (MOBIC) 15 MG tablet Take 1 tablet by mouth daily 7/21/20   LINUS Jeter CNP   omeprazole (PRILOSEC) 40 MG delayed release capsule Take 1 capsule by mouth daily 6/3/20   LINUS Jeter CNP   Blood Pressure Monitoring (BLOOD PRESS MONITOR/M-L CUFF) MISC 1 kit by Does not apply route daily.  4/30/14   Óscar Camejo, DO       Future Appointments   Date Time Provider Iggy Leger   6/14/2021  3:20 PM LINUS Jeter CNP [facility]

## 2021-05-13 NOTE — TELEPHONE ENCOUNTER
Pt called stating he fell and hit his head 3 days ago, and has had a headache and buzzing in his head. Writer spoke with provider, advise was for pt to go to ER.

## 2021-05-31 DIAGNOSIS — M15.9 PRIMARY OSTEOARTHRITIS INVOLVING MULTIPLE JOINTS: ICD-10-CM

## 2021-06-01 RX ORDER — MELOXICAM 15 MG/1
TABLET ORAL
Qty: 90 TABLET | Refills: 3 | Status: SHIPPED | OUTPATIENT
Start: 2021-06-01 | End: 2022-06-01

## 2021-06-10 ENCOUNTER — TELEPHONE (OUTPATIENT)
Dept: PRIMARY CARE CLINIC | Age: 71
End: 2021-06-10

## 2021-06-10 DIAGNOSIS — K59.00 CONSTIPATION, UNSPECIFIED CONSTIPATION TYPE: Primary | ICD-10-CM

## 2021-06-10 RX ORDER — SODIUM PHOSPHATE, DIBASIC AND SODIUM PHOSPHATE, MONOBASIC 7; 19 G/133ML; G/133ML
1 ENEMA RECTAL
Qty: 1 BOTTLE | Refills: 0 | Status: SHIPPED | OUTPATIENT
Start: 2021-06-10 | End: 2021-06-10

## 2021-06-10 NOTE — TELEPHONE ENCOUNTER
Patient called stating his constipated and has not had a BM in 1 week, has tried OTC meds-no help. Is requesting prescription medication for this. Please advise.

## 2021-06-10 NOTE — TELEPHONE ENCOUNTER
Please let him know I have sent in mag citrate for him.  Advise he drink the whole bottle and if no BM in 8-12 hours to use the Fleets enema  Both items have been sent to [facility]  Thanks

## 2021-06-21 ENCOUNTER — OFFICE VISIT (OUTPATIENT)
Dept: PRIMARY CARE CLINIC | Age: 71
End: 2021-06-21
Payer: MEDICARE

## 2021-06-21 ENCOUNTER — HOSPITAL ENCOUNTER (OUTPATIENT)
Age: 71
Setting detail: SPECIMEN
Discharge: HOME | End: 2021-06-21
Payer: MEDICARE

## 2021-06-21 VITALS
SYSTOLIC BLOOD PRESSURE: 80 MMHG | HEART RATE: 69 BPM | DIASTOLIC BLOOD PRESSURE: 48 MMHG | BODY MASS INDEX: 24.3 KG/M2 | OXYGEN SATURATION: 98 % | RESPIRATION RATE: 16 BRPM | WEIGHT: 174.2 LBS

## 2021-06-21 DIAGNOSIS — E03.9 ACQUIRED HYPOTHYROIDISM: ICD-10-CM

## 2021-06-21 DIAGNOSIS — E11.9 NEW ONSET TYPE 2 DIABETES MELLITUS (HCC): Primary | ICD-10-CM

## 2021-06-21 DIAGNOSIS — I95.1 ORTHOSTATIC HYPOTENSION: ICD-10-CM

## 2021-06-21 DIAGNOSIS — I51.89 DIASTOLIC DYSFUNCTION: ICD-10-CM

## 2021-06-21 DIAGNOSIS — D68.9 COAGULATION DEFECT (HCC): ICD-10-CM

## 2021-06-21 DIAGNOSIS — F41.9 ANXIETY: ICD-10-CM

## 2021-06-21 LAB
ABSOLUTE EOS #: 0.09 K/UL (ref 0–0.44)
ABSOLUTE IMMATURE GRANULOCYTE: <0.03 K/UL (ref 0–0.3)
ABSOLUTE LYMPH #: 1.85 K/UL (ref 1.1–3.7)
ABSOLUTE MONO #: 0.49 K/UL (ref 0.1–1.2)
BASOPHILS # BLD: 1 % (ref 0–2)
BASOPHILS ABSOLUTE: 0.06 K/UL (ref 0–0.2)
CREATININE URINE POCT: NORMAL
DIFFERENTIAL TYPE: ABNORMAL
EOSINOPHILS RELATIVE PERCENT: 2 % (ref 1–4)
HBA1C MFR BLD: 6 %
HCT VFR BLD CALC: 43.2 % (ref 40.7–50.3)
HEMOGLOBIN: 13.7 G/DL (ref 13–17)
IMMATURE GRANULOCYTES: 0 %
LYMPHOCYTES # BLD: 30 % (ref 24–43)
MCH RBC QN AUTO: 29.9 PG (ref 25.2–33.5)
MCHC RBC AUTO-ENTMCNC: 31.7 G/DL (ref 28.4–34.8)
MCV RBC AUTO: 94.3 FL (ref 82.6–102.9)
MICROALBUMIN/CREAT 24H UR: NORMAL MG/G{CREAT}
MICROALBUMIN/CREAT UR-RTO: NORMAL
MONOCYTES # BLD: 8 % (ref 3–12)
NRBC AUTOMATED: 0 PER 100 WBC
PDW BLD-RTO: 12.5 % (ref 11.8–14.4)
PLATELET # BLD: 129 K/UL (ref 138–453)
PLATELET ESTIMATE: ABNORMAL
PMV BLD AUTO: 12 FL (ref 8.1–13.5)
RBC # BLD: 4.58 M/UL (ref 4.21–5.77)
RBC # BLD: ABNORMAL 10*6/UL
SEG NEUTROPHILS: 59 % (ref 36–65)
SEGMENTED NEUTROPHILS ABSOLUTE COUNT: 3.6 K/UL (ref 1.5–8.1)
WBC # BLD: 6.1 K/UL (ref 3.5–11.3)
WBC # BLD: ABNORMAL 10*3/UL

## 2021-06-21 PROCEDURE — 83036 HEMOGLOBIN GLYCOSYLATED A1C: CPT | Performed by: NURSE PRACTITIONER

## 2021-06-21 PROCEDURE — 99214 OFFICE O/P EST MOD 30 MIN: CPT | Performed by: NURSE PRACTITIONER

## 2021-06-21 PROCEDURE — 82044 UR ALBUMIN SEMIQUANTITATIVE: CPT | Performed by: NURSE PRACTITIONER

## 2021-06-21 RX ORDER — CITALOPRAM 10 MG/1
TABLET ORAL
Qty: 90 TABLET | Refills: 3 | Status: SHIPPED | OUTPATIENT
Start: 2021-06-21 | End: 2022-05-31

## 2021-06-21 ASSESSMENT — ENCOUNTER SYMPTOMS
WHEEZING: 0
VOMITING: 0
SORE THROAT: 0
CONSTIPATION: 0
SINUS PRESSURE: 0
DIARRHEA: 0
COUGH: 0
TROUBLE SWALLOWING: 0
NAUSEA: 0
BLOOD IN STOOL: 0
ABDOMINAL PAIN: 0
SHORTNESS OF BREATH: 0

## 2021-06-21 NOTE — PROGRESS NOTES
[address] PRIMARY CARE  [address] [facility]  [address] [address]  Dept: [phone]  Dept Fax: [phone]    Dima Quick is a 79 y.o. male who presentstoday for his medical conditions/complaints as noted below. Dima Quick is c/o of  Chief Complaint   Patient presents with    Diabetes    Other     difficulty concentrating, falling         HPI:     Presents with wife today for follow-up   They report he is off gabapentin and topamax  Has decreased his Celexa and is tolerating 10 mg dose celexa well    Has continued to have issues with orthostatic dizziness  Will be having heart cath in July  They report he was told may need a stent  They have several questions about this procedure and whether or not this will help with his dizziness    Home glucose readings have been consistently less than 120  He reports tries to be very careful with his diet as does not want to go on any additional medications  Diabetes  He presents for his follow-up diabetic visit. He has type 2 diabetes mellitus. His disease course has been stable. Hypoglycemia symptoms include dizziness (Postural). Pertinent negatives for hypoglycemia include no confusion, headaches or nervousness/anxiousness. Pertinent negatives for diabetes include no chest pain, no fatigue, no polydipsia, no polyphagia, no polyuria and no weakness. Current diabetic treatment includes diet. He is compliant with treatment most of the time. He is following a generally healthy diet. His home blood glucose trend is decreasing steadily.        Hemoglobin A1C (%)   Date Value   2021 6.0   2021 6.0   2020 6.7 (H)             ( goal A1C is < 7)   No results found for: LABMICR  LDL Cholesterol (mg/dL)   Date Value   2020 33   2020 48   2018 42     LDL Calculated (mg/dL)   Date Value   2016 49   2014 54       (goal LDL is <100)   AST (U/L)   Date Value   2021 12 ALT (U/L)   Date Value   2021 12     BUN (mg/dL)   Date Value   2021 19     BP Readings from Last 3 Encounters:   21 (!) 80/48   21 134/78   03/10/21 138/76          ([id number])    Past Medical History:   Diagnosis Date    Anxiety     Depression     Diabetes mellitus ([address]     Ear infection     history of    Headache(784.0)     migraines    Hyperlipidemia     Insomnia     Osteoarthritis     PTSD (post-traumatic stress disorder)     Unspecified sleep apnea       Past Surgical History:   Procedure Laterality Date    FOOT SURGERY Right     spur    TONSILLECTOMY AND ADENOIDECTOMY         Family History   Problem Relation Age of Onset    Thyroid Cancer Mother     Migraines Mother     Cancer Mother     High Cholesterol Father     Heart Disease Father           Social History     Tobacco Use    Smoking status: Former Smoker     Packs/day: 3.00     Years: 20.00     Pack years: 60.00     Quit date:      Years since quittin.4    Smokeless tobacco: Never Used   Substance Use Topics    Alcohol use: Not Currently     Comment: occ      Current Outpatient Medications   Medication Sig Dispense Refill    citalopram (CELEXA) 10 MG tablet TAKE 1 TABLET DAILY 90 tablet 3    meloxicam (MOBIC) 15 MG tablet TAKE 1 TABLET DAILY 90 tablet 3    busPIRone (BUSPAR) 10 MG tablet TAKE 1 TABLET THREE TIMES A  tablet 3    Elastic Bandages & Supports (JOBST KNEE HIGH COMPRESSION SM) MISC Measure for appropriate sizing 1 each 0    ezetimibe (ZETIA) 10 MG tablet TAKE 1 TABLET DAILY 90 tablet 3    blood glucose monitor strips Test 2  times a day & as needed for symptoms of irregular blood glucose. Dispense sufficient amount for indicated testing frequency plus additional to accommodate PRN testing needs.  180 strip 3    blood glucose test strips (FREESTYLE LITE) strip Use to test blood sugar twice daily and as needed for symptoms of irregular blood sugar 200 each 3    levothyroxine (SYNTHROID) 88 MCG tablet TAKE 1 TABLET DAILY 90 tablet 3    oxybutynin (DITROPAN XL) 10 MG extended release tablet Take 1 tablet by mouth daily 90 tablet 3    Lancets MISC Test glucose 3 times daily and as needed 100 each 5    glucose monitoring kit (FREESTYLE) monitoring kit Supply glucometer, lancets and testing strips best covered by insurance 1 kit 0    fluticasone (FLONASE) 50 MCG/ACT nasal spray 1 spray by Each Nostril route daily as needed for Rhinitis or Allergies      MISC NATURAL PRODUCTS PO Take 1 tablet by mouth daily as needed (gas) (Herbal Bean and Vegetable Gas Relief)      traZODone (DESYREL) 100 MG tablet Take 100 mg by mouth nightly      famotidine (PEPCID) 40 MG tablet Take 1 tablet by mouth every evening 90 tablet 3    simvastatin (ZOCOR) 80 MG tablet TAKE 1 TABLET DAILY AT BEDTIME 90 tablet 3    midodrine (PROAMATINE) 2.5 MG tablet TAKE 1 TABLET THREE TIMES A  tablet 3    omeprazole (PRILOSEC) 40 MG delayed release capsule Take 1 capsule by mouth daily 90 capsule 3    Blood Pressure Monitoring (BLOOD PRESS MONITOR/M-L CUFF) MISC 1 kit by Does not apply route daily. 1 each 0     No current facility-administered medications for this visit.      Allergies   Allergen Reactions    Bee Venom Anaphylaxis    Pcn [Penicillins] Anaphylaxis       Health Maintenance   Topic Date Due    AAA screen  Never done    Diabetic retinal exam  Never done    Shingles Vaccine (2 of 3) 12/22/2017    Annual Wellness Visit (AWV)  07/22/2021 (Originally 5/29/2019)    DTaP/Tdap/Td vaccine (1 - Tdap) 06/21/2022 (Originally 6/29/1969)    Lipid screen  11/20/2021    TSH testing  03/07/2022    Potassium monitoring  03/09/2022    Creatinine monitoring  03/09/2022    Diabetic foot exam  06/21/2022    A1C test (Diabetic or Prediabetic)  06/21/2022    Diabetic microalbuminuria test  06/21/2022    Colon cancer screen colonoscopy  06/04/2024    Flu vaccine  Completed    Pneumococcal 65+ years Vaccine  Completed    COVID-19 Vaccine  Completed    Hepatitis C screen  Completed    Hepatitis A vaccine  Aged Out    Hib vaccine  Aged Out    Meningococcal (ACWY) vaccine  Aged Out       Subjective:      Review of Systems   Constitutional: Negative for activity change, appetite change, chills, fatigue, fever and unexpected weight change. HENT: Negative for congestion, ear pain, hearing loss, sinus pressure, sore throat and trouble swallowing. Eyes: Negative for visual disturbance. Respiratory: Negative for cough, shortness of breath and wheezing. Cardiovascular: Negative for chest pain, palpitations and leg swelling. Gastrointestinal: Negative for abdominal pain, blood in stool, constipation, diarrhea, nausea and vomiting. Endocrine: Negative for cold intolerance, heat intolerance, polydipsia, polyphagia and polyuria. Genitourinary: Negative for difficulty urinating, frequency, hematuria and urgency. Musculoskeletal: Positive for arthralgias. Negative for myalgias. Skin: Negative for rash. Allergic/Immunologic: Negative for environmental allergies. Neurological: Positive for dizziness (Postural). Negative for weakness, light-headedness and headaches. Psychiatric/Behavioral: Negative for confusion. The patient is not nervous/anxious. Objective:     Physical Exam  Constitutional:       Appearance: He is well-developed. HENT:      Head: Normocephalic. Eyes:      Conjunctiva/sclera: Conjunctivae normal.      Pupils: Pupils are equal, round, and reactive to light. Cardiovascular:      Rate and Rhythm: Normal rate and regular rhythm. Heart sounds: Normal heart sounds. No murmur heard. Pulmonary:      Effort: Pulmonary effort is normal.      Breath sounds: Normal breath sounds. No wheezing. Abdominal:      General: Bowel sounds are normal. There is no distension. Palpations: Abdomen is soft. Musculoskeletal:         General: Normal range of motion. Cervical back: Normal range of motion. Skin:     General: Skin is warm and dry. Neurological:      Mental Status: He is alert and oriented to person, place, and time. Psychiatric:         Behavior: Behavior normal.         Thought Content: Thought content normal.         Judgment: Judgment normal.       BP (!) 80/48   Pulse 69   Resp 16   Wt 174 lb 3.2 oz (79 kg)   SpO2 98%   BMI 24.30 kg/m²     Assessment:       Diagnosis Orders   1. New onset type 2 diabetes mellitus (HCC)  POCT glycosylated hemoglobin (Hb A1C)    POCT microalbumin    HM DIABETES FOOT EXAM   2. Acquired hypothyroidism     3. Coagulation defect (HCC)  CBC Auto Differential   4. Anxiety  citalopram (CELEXA) 10 MG tablet   5. Diastolic dysfunction     6. Orthostatic hypotension               Plan:      Return in about 3 months (around 9/21/2021) for low BP, dizziness. Diabetes-A1c has decreased with dietary management, will continue to monitor, reviewed diet  Hypothyroidism-stable on current dose of thyroxine  Coagulation defect-we will repeat CBC to check platelets, suspect this is not a chronic condition that will warrant further evaluation  Anxiety-well-controlled at this time with medication changes and decreasing dosage of citalopram.  We will continue current dose for now  Diastolic dysfunction, orthostatic hypotension-has been seeing cardiologist, has appointment for upcoming cardiac cath.   Questions answered, discussed possible placement of stent  Orders Placed This Encounter   Procedures    CBC Auto Differential     Standing Status:   Future     Number of Occurrences:   1     Standing Expiration Date:   6/21/2022    POCT glycosylated hemoglobin (Hb A1C)    POCT microalbumin    HM DIABETES FOOT EXAM        Orders Placed This Encounter   Medications    citalopram (CELEXA) 10 MG tablet     Sig: TAKE 1 TABLET DAILY     Dispense:  90 tablet     Refill:  3       Patient given educational materials - see patient instructions. Discussed use, benefit, and side effects of prescribed medications. All patientquestions answered. Pt voiced understanding. Reviewed health maintenance. Instructedto continue current medications, diet and exercise. Patient agreed with treatmentplan. Follow up as directed.      Electronicallysigned by LINUS Mccabe CNP on 6/22/2021 at 5:43 PM

## 2021-06-22 PROBLEM — D68.9 COAGULATION DEFECT (HCC): Status: RESOLVED | Noted: 2021-06-21 | Resolved: 2021-06-22

## 2021-07-12 ENCOUNTER — HOSPITAL ENCOUNTER (OUTPATIENT)
Dept: CARDIAC CATH/INVASIVE PROCEDURES | Age: 71
Discharge: HOME | End: 2021-07-12
Payer: MEDICARE

## 2021-07-12 NOTE — H&P
Test 2  times a day & as needed for symptoms of irregular blood glucose. Dispense sufficient amount for indicated testing frequency plus additional to accommodate PRN testing needs. 2/5/21   LINUS Gay CNP   blood glucose test strips (FREESTYLE LITE) strip Use to test blood sugar twice daily and as needed for symptoms of irregular blood sugar 2/5/21   LINUS Gay CNP   levothyroxine (SYNTHROID) 88 MCG tablet TAKE 1 TABLET DAILY 1/25/21   LINUS Dent CNP   oxybutynin (DITROPAN XL) 10 MG extended release tablet Take 1 tablet by mouth daily 1/11/21   LINUS Dent CNP   Lancets MISC Test glucose 3 times daily and as needed 1/4/21   LINUS Dent CNP   glucose monitoring kit (FREESTYLE) monitoring kit Supply glucometer, lancets and testing strips best covered by insurance 12/31/20   LINUS Badillo CNP   fluticasone (FLONASE) 50 MCG/ACT nasal spray 1 spray by Each Nostril route daily as needed for Rhinitis or Allergies    Historical Provider, MD   MISC NATURAL PRODUCTS PO Take 1 tablet by mouth daily as needed (gas) (Herbal Bean and Vegetable Gas Relief)    Historical Provider, MD   traZODone (DESYREL) 100 MG tablet Take 100 mg by mouth nightly 10/1/20   Historical Provider, MD   famotidine (PEPCID) 40 MG tablet Take 1 tablet by mouth every evening 9/28/20   LINUS Dent CNP   simvastatin (ZOCOR) 80 MG tablet TAKE 1 TABLET DAILY AT BEDTIME 9/21/20   LINUS Dent CNP   midodrine (PROAMATINE) 2.5 MG tablet TAKE 1 TABLET THREE TIMES A DAY 9/21/20   Dilcia Vásquez MD   omeprazole (PRILOSEC) 40 MG delayed release capsule Take 1 capsule by mouth daily 6/3/20   LINUS Dent CNP   Blood Pressure Monitoring (BLOOD PRESS MONITOR/M-L CUFF) MISC 1 kit by Does not apply route daily. 4/30/14   [address] [place] [address], DO        No current facility-administered medications for this encounter.     Allergies:  Bee venom and Pcn [penicillins]    Social History:   reports that he quit smoking about 23 years ago. He has a 60.00 pack-year smoking history. He has never used smokeless tobacco. He reports previous alcohol use. He reports previous drug use. Drugs: Marijuana, Cocaine, and Other-see comments. Family History: family history includes Cancer in his mother; Heart Disease in his father; High Cholesterol in his father; Migraines in his mother; Thyroid Cancer in his mother. REVIEW OF SYSTEMS:      Constitutional: there has been no unanticipated weight loss. Eyes: No visual changes or diplopia. ENT: No Headaches  Cardiovascular:  Remaining as above  Respiratory: No cough  Gastrointestinal: No abdominal pain. No change in bowel or bladder habits. Genitourinary: No dysuria, trouble voiding, or hematuria. Neurological: No headache. PHYSICAL EXAM:      There were no vitals taken for this visit. No intake or output data in the 24 hours ending 07/12/21 0855      Labs:     CBC: No results for input(s): WBC, HGB, HCT, PLT in the last 72 hours. BMP: No results for input(s): NA, K, CO2, BUN, CREATININE, LABGLOM, GLUCOSE in the last 72 hours. Pro-BNP:  No results for input(s): PROBNP in the last 72 hours. BNP: No results for input(s): BNP in the last 72 hours. PT/INR: No results for input(s): PROTIME, INR in the last 72 hours. APTT:No results for input(s): APTT in the last 72 hours. CARDIAC ENZYMES:No results for input(s): CKTOTAL, CKMB, CKMBINDEX, TROPONINI in the last 72 hours. Invalid input(s):  TROPONINT  No results for input(s): TROPONINT in the last 72 hours. FASTING LIPID PANEL:  Lab Results   Component Value Date    HDL 35 11/20/2020    LDLCALC 49 06/03/2016    TRIG 179 11/20/2020     LIVER PROFILE:No results for input(s): AST, ALT, LABALBU in the last 72 hours. Patient's Active Problem List  Active Problems:    * No active hospital problems. *  Resolved Problems:    * No resolved hospital problems. *    DATA:      IMPRESSION:    Chest pain with mixed features  Positive stress test with small area inferior apical mild ischemia    RECOMMENDATIONS:  Did not show up for procedure. Attempted to contact patient however no answer. Will have office reschedule coronary angiography. Discussed with patient and Nurse. Selena White MD, M.D. Fellow, [address] [place] [address]      Please note that part of this chart were generated using voice recognition  dictation software. Although every effort was made to ensure the accuracy of this automated transcription, some errors in transcription may have occurred. Attestation signed by      Attending Physician Statement:    I have discussed the care of  Charbel Gutierrez , including pertinent history and exam findings, with the Cardiology fellow/resident. I have seen and examined the patient and the key elements of all parts of the encounter have been performed by me. I agree with the assessment, plan and orders as documented by the fellow/resident, after I modified exam findings and plan of treatments, and the final version is my approved version of the assessment.      Additional Comments:

## 2021-07-26 ENCOUNTER — HOSPITAL ENCOUNTER (OUTPATIENT)
Dept: CARDIAC CATH/INVASIVE PROCEDURES | Age: 71
Discharge: HOME | End: 2021-07-26
Payer: MEDICARE

## 2021-07-26 VITALS
DIASTOLIC BLOOD PRESSURE: 80 MMHG | HEIGHT: 71 IN | TEMPERATURE: 97.6 F | HEART RATE: 61 BPM | BODY MASS INDEX: 23.1 KG/M2 | WEIGHT: 165 LBS | RESPIRATION RATE: 16 BRPM | SYSTOLIC BLOOD PRESSURE: 144 MMHG | OXYGEN SATURATION: 98 %

## 2021-07-26 LAB
ACTIVATED CLOTTING TIME: 393 SEC (ref 79–149)
BUN BLDV-MCNC: 25 MG/DL (ref 8–23)
GFR NON-AFRICAN AMERICAN: 58 ML/MIN
GFR SERPL CREATININE-BSD FRML MDRD: >60 ML/MIN
GFR SERPL CREATININE-BSD FRML MDRD: ABNORMAL ML/MIN/{1.73_M2}
GLUCOSE BLD-MCNC: 121 MG/DL (ref 74–100)
PLATELET # BLD: 115 K/UL (ref 138–453)
POC BUN: 25 MG/DL (ref 8–26)
POC CHLORIDE: 109 MMOL/L (ref 98–107)
POC CREATININE: 1.23 MG/DL (ref 0.51–1.19)
POC HEMATOCRIT: 38 % (ref 41–53)
POC HEMOGLOBIN: 13 G/DL (ref 13.5–17.5)
POC LACTIC ACID: 1.28 MMOL/L (ref 0.56–1.39)
POC POTASSIUM: 3.9 MMOL/L (ref 3.5–4.5)
POC SODIUM: 145 MMOL/L (ref 138–146)

## 2021-07-26 PROCEDURE — 85049 AUTOMATED PLATELET COUNT: CPT

## 2021-07-26 PROCEDURE — 83605 ASSAY OF LACTIC ACID: CPT

## 2021-07-26 PROCEDURE — C1887 CATHETER, GUIDING: HCPCS

## 2021-07-26 PROCEDURE — C9600 PERC DRUG-EL COR STENT SING: HCPCS | Performed by: INTERNAL MEDICINE

## 2021-07-26 PROCEDURE — 93458 L HRT ARTERY/VENTRICLE ANGIO: CPT | Performed by: INTERNAL MEDICINE

## 2021-07-26 PROCEDURE — 85347 COAGULATION TIME ACTIVATED: CPT

## 2021-07-26 PROCEDURE — 7100000011 HC PHASE II RECOVERY - ADDTL 15 MIN

## 2021-07-26 PROCEDURE — 82565 ASSAY OF CREATININE: CPT

## 2021-07-26 PROCEDURE — C1769 GUIDE WIRE: HCPCS

## 2021-07-26 PROCEDURE — 7100000010 HC PHASE II RECOVERY - FIRST 15 MIN

## 2021-07-26 PROCEDURE — C1874 STENT, COATED/COV W/DEL SYS: HCPCS

## 2021-07-26 PROCEDURE — 84132 ASSAY OF SERUM POTASSIUM: CPT

## 2021-07-26 PROCEDURE — 2580000003 HC RX 258: Performed by: INTERNAL MEDICINE

## 2021-07-26 PROCEDURE — C1894 INTRO/SHEATH, NON-LASER: HCPCS

## 2021-07-26 PROCEDURE — 82947 ASSAY GLUCOSE BLOOD QUANT: CPT

## 2021-07-26 PROCEDURE — 2709999900 HC NON-CHARGEABLE SUPPLY

## 2021-07-26 PROCEDURE — 84520 ASSAY OF UREA NITROGEN: CPT

## 2021-07-26 PROCEDURE — 85014 HEMATOCRIT: CPT

## 2021-07-26 PROCEDURE — 84295 ASSAY OF SERUM SODIUM: CPT

## 2021-07-26 PROCEDURE — 36415 COLL VENOUS BLD VENIPUNCTURE: CPT

## 2021-07-26 PROCEDURE — 82435 ASSAY OF BLOOD CHLORIDE: CPT

## 2021-07-26 RX ORDER — SODIUM CHLORIDE 9 MG/ML
25 INJECTION, SOLUTION INTRAVENOUS PRN
Status: DISCONTINUED | OUTPATIENT
Start: 2021-07-26 | End: 2021-07-27 | Stop reason: HOSPADM

## 2021-07-26 RX ORDER — SODIUM CHLORIDE 0.9 % (FLUSH) 0.9 %
5-40 SYRINGE (ML) INJECTION PRN
Status: DISCONTINUED | OUTPATIENT
Start: 2021-07-26 | End: 2021-07-27 | Stop reason: HOSPADM

## 2021-07-26 RX ORDER — SODIUM CHLORIDE 0.9 % (FLUSH) 0.9 %
5-40 SYRINGE (ML) INJECTION EVERY 12 HOURS SCHEDULED
Status: DISCONTINUED | OUTPATIENT
Start: 2021-07-26 | End: 2021-07-27 | Stop reason: HOSPADM

## 2021-07-26 RX ORDER — SODIUM CHLORIDE 9 MG/ML
INJECTION, SOLUTION INTRAVENOUS CONTINUOUS
Status: DISCONTINUED | OUTPATIENT
Start: 2021-07-26 | End: 2021-07-27 | Stop reason: HOSPADM

## 2021-07-26 RX ORDER — LANOLIN ALCOHOL/MO/W.PET/CERES
1000 CREAM (GRAM) TOPICAL DAILY
COMMUNITY

## 2021-07-26 RX ORDER — CLOPIDOGREL BISULFATE 75 MG/1
75 TABLET ORAL DAILY
Qty: 30 TABLET | Refills: 5 | Status: SHIPPED | OUTPATIENT
Start: 2021-07-26

## 2021-07-26 RX ORDER — ACETAMINOPHEN 325 MG/1
650 TABLET ORAL EVERY 4 HOURS PRN
Status: DISCONTINUED | OUTPATIENT
Start: 2021-07-26 | End: 2021-07-27 | Stop reason: HOSPADM

## 2021-07-26 RX ORDER — ASPIRIN 81 MG/1
81 TABLET ORAL DAILY
Qty: 30 TABLET | Refills: 5 | Status: SHIPPED | OUTPATIENT
Start: 2021-07-26

## 2021-07-26 RX ADMIN — SODIUM CHLORIDE: 9 INJECTION, SOLUTION INTRAVENOUS at 12:10

## 2021-07-26 NOTE — PROGRESS NOTES
Patient admitted, consent signed and questions answered. Patient ready for procedure. Call light to reach with side rails up 2 of 2. Right wrist and bilateral groin clipped. Spouse at bedside with patient. History and physical need to be completed.

## 2021-07-26 NOTE — PROGRESS NOTES
PATIENT UP TO RESTROOM. GAIT STEADY. DISCHARGE INSTRUCTIONS PROVIDED.  PATIENT DISCHARGED AT THIS TIME

## 2021-07-26 NOTE — PROGRESS NOTES
[] DISCHARGE INSTRUCTIONS GIVEN WITH 2 WEEK APPT. MADE AND . DOCUMENTED     [] STENT/PCI GUIDELINES GIVEN    [x] ASA  PRESCRIBED POST PROCEDURE    [] WAS A BETA BLOCKER ORDERED IF YES WAS SCRIPT GIVEN TO PT.    [] IF EF < 45 % WAS AN ACE INHIBITOR ORDERED    [x] WAS PLAVIX,EFFIENT,BRILINTA, ORDERED IF YES WAS SCRIPT GIVEN TO       PT,AND INSTRUCTIONS ON IMPORTANCE OF MED    [] DOES PT. NEED 30 & 90 DAY SCRIPTS    [x] CONCERNS ON PURCHASE OF ANTIPLATELETS IF YES.  SEE PROCEDURE ON      PROCESS FOR PT.S TO OBTAIN THESE MEDS    [x] IS PT. ON STATINS IF NO WERE STATINS ORDERED AND SCRIPT GIVEN    [] WERE MEDS RECONCILED, WAS [employer] INFORMATION ON MEDS GIVEN     TO PT.    [x] PRINT DISCHARGE MED LIST CHECK AVS FOR CURRENT MEDS    [x] WAS STENT CARD GIVEN TO PT.

## 2021-07-26 NOTE — PROGRESS NOTES
Returned to room 6 on [facility] same day stent, vitals stable, no c/o of pain offered, rt. Writ vascband in place no bleeding or swelling noted, post procedure instructions given to pt.  And wife, appetite good for lunch, taking fluids well,

## 2021-07-26 NOTE — H&P
[facility]   History & Physical               Today's Date: 7/26/2021  Patient Name: Hermelinda Christina  Date of admission: 7/26/2021 11:00 AM  Patient's age: 70 y.o., 1950  Admission Dx: Abnormal stress ECG [R94.39]    Reason for Admission:  Coronary angiography    CHIEF COMPLAINT:    No chief complaint on file. History Obtained From:  patient, electronic medical record    HISTORY OF PRESENT ILLNESS:    70year old male presents for a coronary angiography to further evaluate a positive stress test. Patient had originally presented to Dr. Carter Males outpatient clinic with reports of chest pain. A subsequent stress test to further evaluate his symptoms showed a small area of inferior apical mild ischemia with no infarct and a normal EF. Patient presents today for further evaluation of his positive stress test.     Past Medical History:   has a past medical history of Anxiety, Depression, Diabetes mellitus ([address] Ear infection, Headache(784.0), Hyperlipidemia, Insomnia, Osteoarthritis, PTSD (post-traumatic stress disorder), and Unspecified sleep apnea. Past Surgical History:   has a past surgical history that includes Tonsillectomy and adenoidectomy; Foot surgery (Right); and Cardiac catheterization (07/26/2021). Home Medications:    Prior to Admission medications    Medication Sig Start Date End Date Taking?  Authorizing Provider   vitamin B-12 (CYANOCOBALAMIN) 1000 MCG tablet Take 1,000 mcg by mouth daily   Yes Historical Provider, MD   citalopram (CELEXA) 10 MG tablet TAKE 1 TABLET DAILY 6/21/21  Yes LINUS Ga CNP   meloxicam (MOBIC) 15 MG tablet TAKE 1 TABLET DAILY 6/1/21  Yes LINUS Ga CNP   busPIRone (BUSPAR) 10 MG tablet TAKE 1 TABLET THREE TIMES A DAY 3/30/21  Yes LINUS Saba CNP   ezetimibe (ZETIA) 10 MG tablet TAKE 1 TABLET DAILY 2/8/21  Yes LINUS Andujar CNP   levothyroxine (SYNTHROID) 88 MCG tablet TAKE 1 TABLET DAILY 1/25/21  Yes Yulia Jeremias Kidney, APRN - CNP   oxybutynin (DITROPAN XL) 10 MG extended release tablet Take 1 tablet by mouth daily 1/11/21  Yes LINUS Rodriges CNP   fluticasone (FLONASE) 50 MCG/ACT nasal spray 1 spray by Each Nostril route daily as needed for Rhinitis or Allergies   Yes Historical Provider, MD   traZODone (DESYREL) 100 MG tablet Take 100 mg by mouth nightly 10/1/20  Yes Historical Provider, MD   famotidine (PEPCID) 40 MG tablet Take 1 tablet by mouth every evening 9/28/20  Yes LINUS Mitchell CNP   simvastatin (ZOCOR) 80 MG tablet TAKE 1 TABLET DAILY AT BEDTIME 9/21/20  Yes LINUS Rodriges CNP   midodrine (PROAMATINE) 2.5 MG tablet TAKE 1 TABLET THREE TIMES A DAY 9/21/20  Yes Noam Medina MD   Elastic Bandages & Supports (JOBST KNEE HIGH COMPRESSION SM) MISC Measure for appropriate sizing 3/12/21   LINUS Mitchell CNP   blood glucose monitor strips Test 2  times a day & as needed for symptoms of irregular blood glucose. Dispense sufficient amount for indicated testing frequency plus additional to accommodate PRN testing needs. 2/5/21   LINUS Vasquez CNP   blood glucose test strips (FREESTYLE LITE) strip Use to test blood sugar twice daily and as needed for symptoms of irregular blood sugar 2/5/21   LINUS Vasquez CNP   Lancets MISC Test glucose 3 times daily and as needed 1/4/21   LINUS Mitchell CNP   glucose monitoring kit (FREESTYLE) monitoring kit Supply glucometer, lancets and testing strips best covered by insurance 12/31/20   LINUS Mitchell CNP   MISC NATURAL PRODUCTS PO Take 1 tablet by mouth daily as needed (gas) (Herbal Bean and Vegetable Gas Relief)    Historical Provider, MD   omeprazole (PRILOSEC) 40 MG delayed release capsule Take 1 capsule by mouth daily 6/3/20   LINUS Mitchell CNP   Blood Pressure Monitoring (BLOOD PRESS MONITOR/M-L CUFF) MISC 1 kit by Does not apply route daily.  4/30/14 Hudson Chacon DO        Current Facility-Administered Medications: 0.9 % sodium chloride infusion, , Intravenous, Continuous    Allergies:  Bee venom and Pcn [penicillins]    Social History:   reports that he quit smoking about 23 years ago. He has a 60.00 pack-year smoking history. He has never used smokeless tobacco. He reports previous alcohol use. He reports previous drug use. Drugs: Marijuana, Cocaine, and Other-see comments. Family History: family history includes Cancer in his mother; Heart Disease in his father; High Cholesterol in his father; Migraines in his mother; Thyroid Cancer in his mother. REVIEW OF SYSTEMS:      Constitutional: there has been no unanticipated weight loss. Eyes: No visual changes or diplopia. ENT: No Headaches  Cardiovascular:  Remaining as above  Respiratory: No cough  Gastrointestinal: No abdominal pain. No change in bowel or bladder habits. Genitourinary: No dysuria, trouble voiding, or hematuria. Neurological: No headache. PHYSICAL EXAM:      /75   Pulse 56   Temp 97.6 °F (36.4 °C) (Oral)   Resp 13   Ht 5' 11\" (1.803 m)   Wt 165 lb (74.8 kg)   SpO2 99%   BMI 23.01 kg/m²    No intake or output data in the 24 hours ending 07/26/21 1235        Constitutional and General Appearance:   alert, cooperative, no distress and appears stated age  HEENT:  PERRL, EOMI  Respiratory:  Normal excursion and expansion without use of accessory muscles  Resp Auscultation:  Good respiratory effort. No for increased work of breathing. On auscultation: clear to auscultation bilaterally  Cardiovascular:  Regular rate and rhythm  S1/S2  No murmurs  The apical impulse is not displaced  Abdomen:  Soft  Bowel sounds present  Non-tender to palpation  Extremities:  No cyanosis or clubbing  Lower extremity edema: No  Skin:  Warm and dry  Neurological:  Alert and oriented.   Moves all extremities well      Labs:     CBC:   Recent Labs     07/26/21  1218   *     BMP: Recent Labs     07/26/21  1207   CREATININE 1.23*   LABGLOM 58*     Pro-BNP:  No results for input(s): PROBNP in the last 72 hours. BNP: No results for input(s): BNP in the last 72 hours. PT/INR: No results for input(s): PROTIME, INR in the last 72 hours. APTT:No results for input(s): APTT in the last 72 hours. CARDIAC ENZYMES:No results for input(s): CKTOTAL, CKMB, CKMBINDEX, TROPONINI in the last 72 hours. Invalid input(s):  TROPONINT  No results for input(s): TROPONINT in the last 72 hours. FASTING LIPID PANEL:  Lab Results   Component Value Date    HDL 35 11/20/2020    LDLCALC 49 06/03/2016    TRIG 179 11/20/2020     LIVER PROFILE:No results for input(s): AST, ALT, LABALBU in the last 72 hours. Patient's Active Problem List  Active Problems:    * No active hospital problems. *  Resolved Problems:    * No resolved hospital problems. *    DATA:       IMPRESSION:    Chest pain with mixed features  Positive stress test with small area inferior apical mild ischemia     RECOMMENDATIONS:  Proceed with procedure  Further recommendations to follow after procedure. Need cardiac cath for risk stratification. Risk and benefits of cardiac catheterization were discussed in detail. Risk of bleeding, requiring blood transfusion, vascular complication requiring surgery, renal insufficieny with need of dialysis, CVA, MI, death and anesthesia complications including intubation were discussed. Patient agrees to proceed and verbalizes understanding. Pre Procedure Conscious Sedation Data:  ASA Class:                  [] I [x] II [] III [] IV     Mallampati Class:       [] I [x] II [] III [] IV    Discussed with patient and Nurse. Kym Bermudez MD, M.D. Fellow, [address]      Please note that part of this chart were generated using voice recognition  dictation software.   Although every effort was made to ensure the accuracy of this automated transcription, some errors in transcription may have occurred. I reviewed the patient history personally, and examined by me during the visit. I have reviewed the H&P/Consult / Progress note as completed, and made appropriate changes to the patient exam and treatment plans.       I have reviewed the case with resident / fellow    Patient treatment plan was explained to patient, correction in notes was made as appropriate, and discussed final arrangement based on  my evaluation and exam.    Additional Recommendations:      Christiane Arriaga MD  [facility]

## 2021-07-26 NOTE — OP NOTE
[facility]    CARDIAC CATHETERIZATION    Date:   7/26/2021  Patient name:  Huong Johnson  Date of admission:  7/26/2021 11:00 AM  MRN:   [id number]  YOB: 1950    Operators:  Primary:   Lacie Palmer MD (Attending Physician)    Procedure performed:     [x] Left Heart Catheterization. [] Graft Angiography. [x] Left Ventriculography. [] Right Heart Catheterization. [x] Coronary Angiography. [] Aortic Valve Studies. [x] PCI: OM1     [] Other:       Indication:  [] STEMI      [] + Stress test  [] ACS      [] + EKG Changes  [] Non Q MI       [] Significant Risk Factors  [] Recurrent Angina             [] Diabetes Mellitus    [] New LBBB      [] Other.  [] CHF / Low EF changes     [] Abnormal CTA / Ca Score      Procedure:  Access:  [] Femoral  [x] Radial  artery       [x] Right  [] Left    Procedure: After informed consent was obtained with explanation of the risks and benefits, patient was brought to the cath lab. The access area was prepped and draped in sterile fashion. 1% lidocaine was used for local block. The artery was cannulated with 6  Fr sheath with brisk arterial blood return. The side port was frequently flushed and aspirated with normal saline. Estimated Blood Loss:  [x] Minimal < 25 cc [] Moderate 25-50 cc  [] >50 cc    Findings:    LMCA: Normal 0% stenosis. LAD: Mild irregularities 10-20%. LCx: Mild irregularities 10-20%. OM1 proximal 75% stenosis         Lesion on 1st Ob Brenna: Proximal subsection. 80% stenosis 10 mm length     reduced to 0%. Pre procedure FELICIA III flow was noted. Post Procedure FELICIA     III flow was present. Good runoff was present. The lesion was diagnosed     as Moderate Risk (B). RCA: Mild irregularities 10-20%. Coronary Tree  Dominance: Right       LV Analysis LV function assessed as:Normal.     The LV gram was performed in the TRONOCSO 30 position. LVEF: 55%.        Conclusions:   Single vessel OM1 proximal 80% stenosis    Successful PTCA -EUFEMIA in OM    Preserved LV function        Recommendations        Post stent protocol     ____________________________________________________________________    History and Risk Factors    [] Hypertension     [] Family history of CAD  [] Hyperlipidemia     [] Cerebrovascular Disease   [] Prior MI       [] Peripheral Vascular disease   [] Prior PCI              [] Diabetes Mellitus    [] Left Main PCI. [] Currently on Dialysis. [] Prior CABG. [] Currently smoker. [] Cardiac Arrest outside of healthcare facility. [] Yes    [x] No        Witnessed     [] Yes   [] No     Arrest after arrival of EMS  [] Yes   [] No     [] Cardiac Arrest at other Facility. [] Yes   [x] No    Pre-Procedure Information. Heart Failure       [] Yes    [x] No        Class  [] I      [] II  [] III    [] IV. New Diagnosis    [] Yes  [] No    HF Type      [] Systolic   [] Diastolic          [] Unknown. Diagnostic Test:   EKG       [] Normal   [x] Abnormal    New antiarrhythmia medications:    [] Yes   [] No   New onset atrial fibrillation / Flutter     [] Yes   [] No   ECG Abnormalities:      [] V. Fib   [] Tracey V. Tach           [] NS V. T   [] New LBBB           [] T. Inv  []  ST dev > 0.5 mm         [] PVC's freq  [] PVC's infrequent    Stress Test Performed:      [x] Yes    [] No     Type:     [] Stress Echo   [] Exercise Stress Test (no imaging)      [x] Stress Nuclear  [] Stress Imaging     Results   [] Negative   [x] Positive        [] Indeterminate  [] Unavailable     If Positive/ Risk / Extent of Ischemia:       [] Low  [x] Intermediate         [] High  [] Unavailable      Cardiac CTA Performed:     [] Yes    [x] No      Results   [] CAD   [] Non obstructive CAD      [] No CAD   [] Uncertain      [] Unknown   [] Structural Disease.      Pre Procedure Medications:   [x] Yes    [] No         [x] ASA   [] Beta Blockers      [] Nitrate   [] Ca Channel Blockers      [] Ranolazine   [x] Statin

## 2021-07-26 NOTE — PROGRESS NOTES
Remains resting quietly, no c/o offered, rt. Wrist remains clean et dry, no swelling no bleeding noted, vitals remain stable.

## 2021-08-19 ENCOUNTER — TELEPHONE (OUTPATIENT)
Dept: FAMILY MEDICINE CLINIC | Age: 71
End: 2021-08-19

## 2021-08-19 NOTE — TELEPHONE ENCOUNTER
Patient's wife, Yen Márquez called the walk in office tonight and reported patient was having low blood pressure 88/60. He is feeling dizzy. Yen Márquez states she contacted cardiology today and informed of patient having low BP and Midodrine dose was doubled. Wife reports she gave increased dose at 4:00 and blood pressure remains low 84/60 and patient does not feel well. He does not have fever or chest pain. I advised patient's wife to take patient to the ER for further evaluation and treatment.

## 2021-08-20 ENCOUNTER — HOSPITAL ENCOUNTER (EMERGENCY)
Age: 71
Discharge: HOME | End: 2021-08-20
Attending: EMERGENCY MEDICINE
Payer: MEDICARE

## 2021-08-20 ENCOUNTER — APPOINTMENT (OUTPATIENT)
Dept: GENERAL RADIOLOGY | Age: 71
End: 2021-08-20
Payer: MEDICARE

## 2021-08-20 VITALS
SYSTOLIC BLOOD PRESSURE: 154 MMHG | DIASTOLIC BLOOD PRESSURE: 81 MMHG | OXYGEN SATURATION: 99 % | RESPIRATION RATE: 20 BRPM | WEIGHT: 165 LBS | TEMPERATURE: 97.9 F | HEART RATE: 60 BPM | BODY MASS INDEX: 23.1 KG/M2 | HEIGHT: 71 IN

## 2021-08-20 DIAGNOSIS — R42 LIGHTHEADEDNESS: Primary | ICD-10-CM

## 2021-08-20 DIAGNOSIS — R55 NEAR SYNCOPE: ICD-10-CM

## 2021-08-20 LAB
ABSOLUTE EOS #: 0.2 K/UL (ref 0–0.4)
ABSOLUTE IMMATURE GRANULOCYTE: ABNORMAL K/UL (ref 0–0.3)
ABSOLUTE LYMPH #: 1.5 K/UL (ref 1–4.8)
ABSOLUTE MONO #: 0.5 K/UL (ref 0.1–1.2)
ANION GAP SERPL CALCULATED.3IONS-SCNC: 10 MMOL/L (ref 9–17)
BASOPHILS # BLD: 1 % (ref 0–2)
BASOPHILS ABSOLUTE: 0 K/UL (ref 0–0.2)
BUN BLDV-MCNC: 13 MG/DL (ref 8–23)
BUN/CREAT BLD: ABNORMAL (ref 9–20)
CALCIUM SERPL-MCNC: 8.9 MG/DL (ref 8.6–10.4)
CHLORIDE BLD-SCNC: 106 MMOL/L (ref 98–107)
CO2: 25 MMOL/L (ref 20–31)
CREAT SERPL-MCNC: 1.09 MG/DL (ref 0.7–1.2)
DIFFERENTIAL TYPE: ABNORMAL
EOSINOPHILS RELATIVE PERCENT: 3 % (ref 1–4)
GFR AFRICAN AMERICAN: >60 ML/MIN
GFR NON-AFRICAN AMERICAN: >60 ML/MIN
GFR SERPL CREATININE-BSD FRML MDRD: ABNORMAL ML/MIN/{1.73_M2}
GFR SERPL CREATININE-BSD FRML MDRD: ABNORMAL ML/MIN/{1.73_M2}
GLUCOSE BLD-MCNC: 127 MG/DL (ref 70–99)
HCT VFR BLD CALC: 38.1 % (ref 41–53)
HEMOGLOBIN: 12.7 G/DL (ref 13.5–17.5)
IMMATURE GRANULOCYTES: ABNORMAL %
LACTIC ACID, SEPSIS WHOLE BLOOD: NORMAL MMOL/L (ref 0.5–1.9)
LACTIC ACID, SEPSIS: 1.1 MMOL/L (ref 0.5–1.9)
LYMPHOCYTES # BLD: 27 % (ref 24–44)
MCH RBC QN AUTO: 30.3 PG (ref 26–34)
MCHC RBC AUTO-ENTMCNC: 33.4 G/DL (ref 31–37)
MCV RBC AUTO: 90.7 FL (ref 80–100)
MONOCYTES # BLD: 9 % (ref 2–11)
NRBC AUTOMATED: ABNORMAL PER 100 WBC
PDW BLD-RTO: 13.4 % (ref 12.5–15.4)
PLATELET # BLD: 125 K/UL (ref 140–450)
PLATELET ESTIMATE: ABNORMAL
PMV BLD AUTO: 9.7 FL (ref 6–12)
POTASSIUM SERPL-SCNC: 4.2 MMOL/L (ref 3.7–5.3)
RBC # BLD: 4.2 M/UL (ref 4.5–5.9)
RBC # BLD: ABNORMAL 10*6/UL
SEG NEUTROPHILS: 60 % (ref 36–66)
SEGMENTED NEUTROPHILS ABSOLUTE COUNT: 3.3 K/UL (ref 1.8–7.7)
SODIUM BLD-SCNC: 141 MMOL/L (ref 135–144)
TROPONIN INTERP: NORMAL
TROPONIN INTERP: NORMAL
TROPONIN T: NORMAL NG/ML
TROPONIN T: NORMAL NG/ML
TROPONIN, HIGH SENSITIVITY: 12 NG/L (ref 0–22)
TROPONIN, HIGH SENSITIVITY: 13 NG/L (ref 0–22)
WBC # BLD: 5.4 K/UL (ref 3.5–11)
WBC # BLD: ABNORMAL 10*3/UL

## 2021-08-20 PROCEDURE — 93005 ELECTROCARDIOGRAM TRACING: CPT | Performed by: EMERGENCY MEDICINE

## 2021-08-20 PROCEDURE — 96361 HYDRATE IV INFUSION ADD-ON: CPT

## 2021-08-20 PROCEDURE — 99285 EMERGENCY DEPT VISIT HI MDM: CPT

## 2021-08-20 PROCEDURE — 83605 ASSAY OF LACTIC ACID: CPT

## 2021-08-20 PROCEDURE — 80048 BASIC METABOLIC PNL TOTAL CA: CPT

## 2021-08-20 PROCEDURE — 85025 COMPLETE CBC W/AUTO DIFF WBC: CPT

## 2021-08-20 PROCEDURE — 71045 X-RAY EXAM CHEST 1 VIEW: CPT

## 2021-08-20 PROCEDURE — 84484 ASSAY OF TROPONIN QUANT: CPT

## 2021-08-20 PROCEDURE — 96360 HYDRATION IV INFUSION INIT: CPT

## 2021-08-20 PROCEDURE — 6370000000 HC RX 637 (ALT 250 FOR IP): Performed by: EMERGENCY MEDICINE

## 2021-08-20 PROCEDURE — 2580000003 HC RX 258: Performed by: EMERGENCY MEDICINE

## 2021-08-20 PROCEDURE — 36415 COLL VENOUS BLD VENIPUNCTURE: CPT

## 2021-08-20 RX ORDER — ASPIRIN 81 MG/1
81 TABLET, CHEWABLE ORAL ONCE
Status: COMPLETED | OUTPATIENT
Start: 2021-08-20 | End: 2021-08-20

## 2021-08-20 RX ORDER — CLOPIDOGREL BISULFATE 75 MG/1
75 TABLET ORAL ONCE
Status: COMPLETED | OUTPATIENT
Start: 2021-08-20 | End: 2021-08-20

## 2021-08-20 RX ORDER — SODIUM CHLORIDE, SODIUM LACTATE, POTASSIUM CHLORIDE, AND CALCIUM CHLORIDE .6; .31; .03; .02 G/100ML; G/100ML; G/100ML; G/100ML
1000 INJECTION, SOLUTION INTRAVENOUS ONCE
Status: COMPLETED | OUTPATIENT
Start: 2021-08-20 | End: 2021-08-20

## 2021-08-20 RX ADMIN — SODIUM CHLORIDE, POTASSIUM CHLORIDE, SODIUM LACTATE AND CALCIUM CHLORIDE 1000 ML: 600; 310; 30; 20 INJECTION, SOLUTION INTRAVENOUS at 15:35

## 2021-08-20 RX ADMIN — CLOPIDOGREL BISULFATE 75 MG: 75 TABLET ORAL at 17:32

## 2021-08-20 RX ADMIN — ASPIRIN 81 MG: 81 TABLET, CHEWABLE ORAL at 17:31

## 2021-08-20 ASSESSMENT — ENCOUNTER SYMPTOMS
RHINORRHEA: 0
SORE THROAT: 0
PHOTOPHOBIA: 0
COUGH: 0
CONSTIPATION: 0
NAUSEA: 0
ABDOMINAL PAIN: 0
BACK PAIN: 0
DIARRHEA: 0
VOMITING: 0
SHORTNESS OF BREATH: 1

## 2021-08-20 NOTE — TELEPHONE ENCOUNTER
Agree with ED, there is nothing we can offer over phone and cardiology will also likely want him evaluated in ED, thanks

## 2021-08-20 NOTE — TELEPHONE ENCOUNTER
Patient's wife called again, patient is not feeling well at all, she states he feels like he could just fall down and he is a big [place] and she states she could not pick him up. Advised wife to call cardiologist ASAP. While writer was typing note, called patient's wife back to see if he went to ER last night but she states he refused, is trying to call cardiologist but cannot get thru. She has her phone blocked from [address] calls and can only put 2 numbers in her phone to receive calls and states cardiologist has 5 different numbers they call from. Again advised if she cannot reach the office, patient should go to ER.

## 2021-08-20 NOTE — ED NOTES
Vail given per pt request.  Continue to monitor. Lab called for 2 trop blood draw.       Coco Hernández RN  08/20/21 4276

## 2021-08-20 NOTE — ED NOTES
Update at bedside per dr Nolasco Link with results and plan of care.      Sabino Cline RN  08/20/21 5892

## 2021-08-20 NOTE — ED PROVIDER NOTES
[address] [facility]  [address] [facility] [address]  Phone: [phone]  Fax: [phone]        Pt Name: Darlin Bates  MRN: [id number]  [place] 1950  Date of evaluation: 8/20/21      CHIEF COMPLAINT     Chief Complaint   Patient presents with    Hypotension     low bp at home over last several days    Dizziness     lightheaded and unchanged since before cardiac stent placement (7/26/2021)         HISTORY OF PRESENT ILLNESS  (Location/Symptom, Timing/Onset, Context/Setting, Quality, Duration, Modifying Factors, Severity.)    Darlin Bates is a 70 y.o. male who presents with low blood pressure. The patient states that he has been having low blood pressure intermittently ever since his stent was placed on July 26 of 2021. Patient states that at home, using his wrist cuff, he had readings of 60/50, 73/42, and 83/58. Patient states that he has not had any nausea, vomiting, diarrhea. Patient denies fever or chills. No dysuria, frequency, or urgency. No melena, hematochezia, or hematemesis. He does report having intermittent, nonexertional chest discomfort that goes across his whole chest and last for seconds. He is occasionally short of breath. No nausea or vomiting. No palpitations. No diaphoresis. Patient does not currently have chest discomfort. He does admit that he has not been wearing his compression socks. He states that he is drinking water. When I asked him about his diet, he was a little bit evasive and states \"I eat every day. I have a large lunch and then I snack. \". Patient reports going from 220 pounds to 165 pounds since January, but states the weight loss has been intentional.  Patient denies abdominal pain. The patient has no prior history of venous thromboembolism. No recent travel. No recent surgery. No leg swelling. No hemoptysis. No estrogen containing medications. No history of malignancy.   When he noted that his blood pressure was going low, they called the cardiologist.  His cardiologist increased his midodrine that he is on for his hypotension to 7.5. Patient had a dose of midodrine last night at the new dosage, and again this morning. REVIEW OF SYSTEMS    (2-9 systems for level 4, 10 or more for level 5)     Review of Systems   Constitutional: Negative for chills and fever. HENT: Negative for congestion, rhinorrhea and sore throat. Eyes: Negative for photophobia and visual disturbance. Respiratory: Positive for shortness of breath. Negative for cough. Cardiovascular: Positive for chest pain. Negative for palpitations and leg swelling. Gastrointestinal: Negative for abdominal pain, constipation, diarrhea, nausea and vomiting. Genitourinary: Negative for dysuria, frequency and urgency. Musculoskeletal: Negative for back pain and neck pain. Skin: Negative for rash and wound. Neurological: Negative for dizziness, light-headedness and headaches. Hematological: Negative for adenopathy. Does not bruise/bleed easily. PAST MEDICAL HISTORY    has a past medical history of Anxiety, Depression, Diabetes mellitus ([address] Ear infection, Headache(784.0), Hyperlipidemia, Insomnia, Osteoarthritis, PTSD (post-traumatic stress disorder), and Unspecified sleep apnea. SURGICAL HISTORY      has a past surgical history that includes Tonsillectomy and adenoidectomy; Foot surgery (Right); and Cardiac catheterization (07/26/2021). CURRENTMEDICATIONS       Previous Medications    ASPIRIN EC 81 MG EC TABLET    Take 1 tablet by mouth daily    BLOOD GLUCOSE MONITOR STRIPS    Test 2  times a day & as needed for symptoms of irregular blood glucose. Dispense sufficient amount for indicated testing frequency plus additional to accommodate PRN testing needs.     BLOOD GLUCOSE TEST STRIPS (FREESTYLE LITE) STRIP    Use to test blood sugar twice daily and as needed for symptoms of irregular blood sugar    BLOOD PRESSURE MONITORING (BLOOD PRESS MONITOR/M-L CUFF) MISC    1 kit by Does not apply route daily. BUSPIRONE (BUSPAR) 10 MG TABLET    TAKE 1 TABLET THREE TIMES A DAY    CITALOPRAM (CELEXA) 10 MG TABLET    TAKE 1 TABLET DAILY    CLOPIDOGREL (PLAVIX) 75 MG TABLET    Take 1 tablet by mouth daily    ELASTIC BANDAGES & SUPPORTS (JOBST KNEE HIGH COMPRESSION SM) MISC    Measure for appropriate sizing    EZETIMIBE (ZETIA) 10 MG TABLET    TAKE 1 TABLET DAILY    FAMOTIDINE (PEPCID) 40 MG TABLET    Take 1 tablet by mouth every evening    FLUTICASONE (FLONASE) 50 MCG/ACT NASAL SPRAY    1 spray by Each Nostril route daily as needed for Rhinitis or Allergies    GLUCOSE MONITORING KIT (FREESTYLE) MONITORING KIT    Supply glucometer, lancets and testing strips best covered by insurance    LANCETS MISC    Test glucose 3 times daily and as needed    LEVOTHYROXINE (SYNTHROID) 88 MCG TABLET    TAKE 1 TABLET DAILY    MELOXICAM (MOBIC) 15 MG TABLET    TAKE 1 TABLET DAILY    MIDODRINE (PROAMATINE) 2.5 MG TABLET    TAKE 1 TABLET THREE TIMES A DAY    MISC NATURAL PRODUCTS PO    Take 1 tablet by mouth daily as needed (gas) (Herbal Bean and Vegetable Gas Relief)    OMEPRAZOLE (PRILOSEC) 40 MG DELAYED RELEASE CAPSULE    Take 1 capsule by mouth daily    OXYBUTYNIN (DITROPAN XL) 10 MG EXTENDED RELEASE TABLET    Take 1 tablet by mouth daily    SIMVASTATIN (ZOCOR) 80 MG TABLET    TAKE 1 TABLET DAILY AT BEDTIME    TRAZODONE (DESYREL) 100 MG TABLET    Take 100 mg by mouth nightly    VITAMIN B-12 (CYANOCOBALAMIN) 1000 MCG TABLET    Take 1,000 mcg by mouth daily       ALLERGIES     is allergic to bee venom and pcn [penicillins]. FAMILY HISTORY     He indicated that his mother is . He indicated that his father is . family history includes Cancer in his mother; Heart Disease in his father; High Cholesterol in his father; Migraines in his mother; Thyroid Cancer in his mother. SOCIAL HISTORY      reports that he quit smoking about 23 years ago.  He history of low blood pressure presents with low blood pressure. No evidence of any infectious process. The patient's blood pressure improved markedly with IV fluids. Cardiac work-up is negative. Patient denies chest pain. I believe at this time, he is safe for discharge. He will follow closely with his primary care doctor and cardiologist.    The patient understands that at this time there is no evidence for a more malignant underlying process, but the patient also understands that early in the process of an illness or injury, an emergency department workup can be falsely reassuring. Routine discharge counseling was given, and the patient understands that worsening, changing or persistent symptoms should prompt an immediate call or follow up with their primary physician or return to the emergency department. The importance of appropriate follow up was also discussed. I have reviewed the disposition diagnosis with the patient and or their family/guardian. I have answered their questions and given discharge instructions. They voiced understanding of these instructions and did not have any further questions or complaints.           DIAGNOSTIC RESULTS     EKG: All EKG's are interpreted by the Emergency Department Physician who either signs or Co-signs this chart in the absence of a cardiologist.    EKG Interpretation    Interpreted by me    Rhythm: normal sinus   Rate: normal  Axis: normal  Ectopy: none  Conduction: normal  ST Segments: no acute change  T Waves: no acute change  Q Waves: none    Clinical Impression: no acute changes and normal EKG    RADIOLOGY:        Interpretation per the Radiologist below, if available at the time of this note:    XR CHEST PORTABLE    Result Date: 8/20/2021  EXAMINATION: ONE XRAY VIEW OF THE CHEST 8/20/2021 3:50 pm COMPARISON: CT chest March 7, 2021 HISTORY: ORDERING SYSTEM PROVIDED HISTORY: chest pain TECHNOLOGIST PROVIDED HISTORY: chest pain Reason for Exam: Hypotension, Troponin, High Sensitivity 13 0 - 22 ng/L    Troponin T NOT REPORTED <0.03 ng/mL    Troponin Interp NOT REPORTED    Troponin   Result Value Ref Range    Troponin, High Sensitivity 12 0 - 22 ng/L    Troponin T NOT REPORTED <0.03 ng/mL    Troponin Interp NOT REPORTED    Lactate, Sepsis   Result Value Ref Range    Lactic Acid, Sepsis 1.1 0.5 - 1.9 mmol/L    Lactic Acid, Sepsis, Whole Blood NOT REPORTED 0.5 - 1.9 mmol/L       Mild anemia, normal troponin x2, elevated glucose    EMERGENCY DEPARTMENT COURSE:   Vitals:    Vitals:    08/20/21 1520 08/20/21 1600 08/20/21 1620 08/20/21 1720   BP: 95/62 114/66 133/73 139/76   Pulse: 67 62 59 60   Resp:       Temp:       TempSrc:       SpO2: 94% 97% 98% 97%   Weight:       Height:         -------------------------  BP: 139/76, Temp: 97.9 °F (36.6 °C), Pulse: 60, Resp: 20      RE-EVALUATION:  6:11 PM EDT  Patient resting comfortably. Updated on results. Blood pressure improving with fluids. CONSULTS:  none    PROCEDURES:  None    FINAL IMPRESSION      1.  Lightheadedness          DISPOSITION/PLAN   DISPOSITION        CONDITION ON DISPOSITION:   Stable     PATIENT REFERRED TO:  LINUS Dunn - CNP  [address] [place] [address] [address]  [facility] [address] [facility]  [phone]    Schedule an appointment as soon as possible for a visit in 2 days      Rio Garsia MD  [address] [place] [id number] [phone]    Schedule an appointment as soon as possible for a visit in 2 days        [address]:  New Prescriptions    No medications on file       (Please note that portions of this note were completed with a voicerecognition program.  Efforts were made to edit the dictations but occasionally words are mis-transcribed.)    Gilma Swift MD  Attending Emergency Medicine Physician       Gilma Swift MD  08/20/21 3212

## 2021-08-21 LAB
EKG ATRIAL RATE: 70 BPM
EKG P AXIS: -5 DEGREES
EKG P-R INTERVAL: 140 MS
EKG Q-T INTERVAL: 428 MS
EKG QRS DURATION: 92 MS
EKG QTC CALCULATION (BAZETT): 462 MS
EKG R AXIS: -22 DEGREES
EKG T AXIS: 24 DEGREES
EKG VENTRICULAR RATE: 70 BPM

## 2021-09-09 DIAGNOSIS — K21.9 GASTROESOPHAGEAL REFLUX DISEASE: ICD-10-CM

## 2021-09-09 RX ORDER — FAMOTIDINE 40 MG/1
TABLET, FILM COATED ORAL
Qty: 90 TABLET | Refills: 3 | Status: SHIPPED | OUTPATIENT
Start: 2021-09-09 | End: 2022-08-12

## 2021-09-18 DIAGNOSIS — E78.1 HYPERTRIGLYCERIDEMIA: ICD-10-CM

## 2021-09-20 RX ORDER — SIMVASTATIN 80 MG
TABLET ORAL
Qty: 90 TABLET | Refills: 3 | Status: SHIPPED | OUTPATIENT
Start: 2021-09-20 | End: 2022-08-30

## 2021-11-16 DIAGNOSIS — E11.9 NEW ONSET TYPE 2 DIABETES MELLITUS (HCC): ICD-10-CM

## 2021-11-16 RX ORDER — BLOOD-GLUCOSE METER
KIT MISCELLANEOUS
Qty: 150 EACH | Refills: 0 | Status: SHIPPED | OUTPATIENT
Start: 2021-11-16 | End: 2022-01-25

## 2021-12-02 RX ORDER — TRAZODONE HYDROCHLORIDE 100 MG/1
TABLET ORAL
Qty: 90 TABLET | Refills: 3 | Status: SHIPPED | OUTPATIENT
Start: 2021-12-02

## 2022-01-10 DIAGNOSIS — E03.9 ACQUIRED HYPOTHYROIDISM: ICD-10-CM

## 2022-01-10 RX ORDER — LEVOTHYROXINE SODIUM 88 UG/1
TABLET ORAL
Qty: 90 TABLET | Refills: 3 | Status: SHIPPED | OUTPATIENT
Start: 2022-01-10

## 2022-01-10 NOTE — TELEPHONE ENCOUNTER
----- Message from [address] sent at 1/10/2022  4:19 PM EST -----  Subject: Refill Request    QUESTIONS  Name of Medication? levothyroxine (SYNTHROID) 88 MCG tablet  Patient-reported dosage and instructions? 1 tablet with breakfast  How many days do you have left? 14  Preferred Pharmacy? [address] phone number (if available)? [phone]  ---------------------------------------------------------------------------  --------------  CALL BACK INFO  What is the best way for the office to contact you? OK to leave message on   voicemail  Preferred Call Back Phone Number?  [phone]

## 2022-01-18 ENCOUNTER — NURSE ONLY (OUTPATIENT)
Dept: FAMILY MEDICINE CLINIC | Age: 72
End: 2022-01-18

## 2022-01-18 ENCOUNTER — HOSPITAL ENCOUNTER (OUTPATIENT)
Age: 72
Setting detail: SPECIMEN
Discharge: HOME | End: 2022-01-18

## 2022-01-18 ENCOUNTER — TELEPHONE (OUTPATIENT)
Dept: PRIMARY CARE CLINIC | Age: 72
End: 2022-01-18

## 2022-01-18 DIAGNOSIS — R09.81 SINUS CONGESTION: ICD-10-CM

## 2022-01-18 DIAGNOSIS — R52 BODY ACHES: ICD-10-CM

## 2022-01-18 DIAGNOSIS — R53.83 FATIGUE, UNSPECIFIED TYPE: Primary | ICD-10-CM

## 2022-01-18 DIAGNOSIS — Z11.52 ENCOUNTER FOR SCREENING FOR COVID-19: Primary | ICD-10-CM

## 2022-01-18 NOTE — TELEPHONE ENCOUNTER
Patient's wife states that patient needs tested for COVID.  He's not feeling good and has fatigue, body aches, sinus congestion (same as wife)

## 2022-01-18 NOTE — PROGRESS NOTES
Pt to car side for covid testing  Verified name/spelling/ w/ patient  Swabbed nasopharyngeal and sent specimen to lab  Pt tolerated

## 2022-01-19 DIAGNOSIS — R09.81 SINUS CONGESTION: ICD-10-CM

## 2022-01-19 DIAGNOSIS — R53.83 FATIGUE, UNSPECIFIED TYPE: ICD-10-CM

## 2022-01-19 DIAGNOSIS — R52 BODY ACHES: ICD-10-CM

## 2022-01-20 LAB
SARS-COV-2: NORMAL
SARS-COV-2: NOT DETECTED
SOURCE: NORMAL

## 2022-01-25 ENCOUNTER — TELEPHONE (OUTPATIENT)
Dept: PRIMARY CARE CLINIC | Age: 72
End: 2022-01-25

## 2022-01-25 ENCOUNTER — TELEMEDICINE (OUTPATIENT)
Dept: PRIMARY CARE CLINIC | Age: 72
End: 2022-01-25
Payer: MEDICARE

## 2022-01-25 DIAGNOSIS — E11.9 NEW ONSET TYPE 2 DIABETES MELLITUS (HCC): Primary | ICD-10-CM

## 2022-01-25 DIAGNOSIS — L60.8 CHANGE IN NAIL APPEARANCE: ICD-10-CM

## 2022-01-25 PROCEDURE — 99213 OFFICE O/P EST LOW 20 MIN: CPT | Performed by: NURSE PRACTITIONER

## 2022-01-25 RX ORDER — LANCETS 28 GAUGE
EACH MISCELLANEOUS
Qty: 100 EACH | Refills: 3 | Status: SHIPPED | OUTPATIENT
Start: 2022-01-25 | End: 2022-02-09 | Stop reason: SDUPTHER

## 2022-01-25 RX ORDER — GLUCOSAMINE HCL/CHONDROITIN SU 500-400 MG
CAPSULE ORAL
Qty: 100 STRIP | Refills: 5 | Status: SHIPPED | OUTPATIENT
Start: 2022-01-25 | End: 2022-02-09 | Stop reason: SDUPTHER

## 2022-01-25 ASSESSMENT — ENCOUNTER SYMPTOMS
DIARRHEA: 0
SINUS PRESSURE: 0
VOMITING: 0
SORE THROAT: 0
COUGH: 0
BLOOD IN STOOL: 0
ABDOMINAL PAIN: 0
SHORTNESS OF BREATH: 0
TROUBLE SWALLOWING: 0
CONSTIPATION: 0
WHEEZING: 0
NAUSEA: 0

## 2022-01-25 ASSESSMENT — PATIENT HEALTH QUESTIONNAIRE - PHQ9
SUM OF ALL RESPONSES TO PHQ QUESTIONS 1-9: 0
2. FEELING DOWN, DEPRESSED OR HOPELESS: 0
1. LITTLE INTEREST OR PLEASURE IN DOING THINGS: 0
SUM OF ALL RESPONSES TO PHQ QUESTIONS 1-9: 0
SUM OF ALL RESPONSES TO PHQ9 QUESTIONS 1 & 2: 0

## 2022-01-25 NOTE — TELEPHONE ENCOUNTER
----- Message from Ihsan Schmitt sent at 1/25/2022 11:42 AM EST -----  Subject: Message to Provider    QUESTIONS  Information for Provider? Pt called to verify VV appt at 11:20am today is   still schedule as Dr. Tomasa Arenas has yet to join for visit, pt is waiting   patiently.  ---------------------------------------------------------------------------  --------------  [address]  What is the best way for the office to contact you? OK to leave message on   voicemail  Preferred Call Back Phone Number? [phone]  ---------------------------------------------------------------------------  --------------  SCRIPT ANSWERS  Relationship to Patient?  Self

## 2022-01-25 NOTE — PROGRESS NOTES
[facility]  [address] [facility]  [address] [address]  Dept: [phone]  Dept Fax: [phone]    Tanesha Hilario is a 70 y.o. male who presentstoday for his medical conditions/complaints as noted below.   Tanesha Hilario is c/o of  Chief Complaint   Patient presents with    Other     Discuss blood sugar, concern with finger nails       HPI:     Presents today via video virtual visit  Has been over 6 months since his last visit  During that timeframe he has had a cardiac stent placed  States has been doing well since, has followed with cardiologist  Voicing concern about  home glucose readings, averaging , checking 2-3 times daily  States when initially diagnosed he made several diet changes but recently has resumed eating breakfast cereals daily along with some other foods he was previously avoiding    Asking about changes in his finger nails that he has noticed over the past few months, \"seem to be getting ridges\"  Denies color change, no pain or redness around the nail      Hemoglobin A1C (%)   Date Value   2021 6.0   2021 6.0   2020 6.7 (H)             ( goal A1C is < 7)   No results found for: LABMICR  LDL Cholesterol (mg/dL)   Date Value   2020 33   2020 48   2018 42     LDL Calculated (mg/dL)   Date Value   2016 49   2014 54       (goal LDL is <100)   AST (U/L)   Date Value   2021 12     ALT (U/L)   Date Value   2021 12     BUN (mg/dL)   Date Value   2021 13     BP Readings from Last 3 Encounters:   21 (!) 154/81   21 (!) 144/80   21 (!) 80/48          (tqmi677/80)    Past Medical History:   Diagnosis Date    Anxiety     Depression     Diabetes mellitus ([address]     Ear infection     history of    Headache(784.0)     migraines    Hyperlipidemia     Insomnia     Osteoarthritis     PTSD (post-traumatic stress disorder)     Unspecified sleep apnea Past Surgical History:   Procedure Laterality Date    CARDIAC CATHETERIZATION  2021    FOOT SURGERY Right     spur    TONSILLECTOMY AND ADENOIDECTOMY         Family History   Problem Relation Age of Onset    Thyroid Cancer Mother     Migraines Mother     Cancer Mother     High Cholesterol Father     Heart Disease Father           Social History     Tobacco Use    Smoking status: Former Smoker     Packs/day: 3.00     Years: 20.00     Pack years: 60.00     Quit date:      Years since quittin.0    Smokeless tobacco: Never Used   Substance Use Topics    Alcohol use: Not Currently      Current Outpatient Medications   Medication Sig Dispense Refill    FreeStyle Lancets MISC Test glucose three times daily 100 each 3    blood glucose monitor strips Test 3 times a day & as needed for symptoms of irregular blood glucose.  100 strip 5    levothyroxine (SYNTHROID) 88 MCG tablet TAKE 1 TABLET DAILY 90 tablet 3    traZODone (DESYREL) 100 MG tablet TAKE 1 TABLET NIGHTLY 90 tablet 3    simvastatin (ZOCOR) 80 MG tablet TAKE 1 TABLET DAILY AT BEDTIME 90 tablet 3    famotidine (PEPCID) 40 MG tablet TAKE 1 TABLET EVERY EVENING 90 tablet 3    vitamin B-12 (CYANOCOBALAMIN) 1000 MCG tablet Take 1,000 mcg by mouth daily      aspirin EC 81 MG EC tablet Take 1 tablet by mouth daily 30 tablet 5    clopidogrel (PLAVIX) 75 MG tablet Take 1 tablet by mouth daily 30 tablet 5    citalopram (CELEXA) 10 MG tablet TAKE 1 TABLET DAILY 90 tablet 3    meloxicam (MOBIC) 15 MG tablet TAKE 1 TABLET DAILY 90 tablet 3    busPIRone (BUSPAR) 10 MG tablet TAKE 1 TABLET THREE TIMES A  tablet 3    Elastic Bandages & Supports (JOBST KNEE HIGH COMPRESSION SM) MISC Measure for appropriate sizing 1 each 0    ezetimibe (ZETIA) 10 MG tablet TAKE 1 TABLET DAILY 90 tablet 3    oxybutynin (DITROPAN XL) 10 MG extended release tablet Take 1 tablet by mouth daily 90 tablet 3    glucose monitoring kit (FREESTYLE) monitoring kit Supply glucometer, lancets and testing strips best covered by insurance 1 kit 0    fluticasone (FLONASE) 50 MCG/ACT nasal spray 1 spray by Each Nostril route daily as needed for Rhinitis or Allergies      MISC NATURAL PRODUCTS PO Take 1 tablet by mouth daily as needed (gas) (Herbal Bean and Vegetable Gas Relief)      midodrine (PROAMATINE) 2.5 MG tablet TAKE 1 TABLET THREE TIMES A DAY (Patient taking differently: Take 7.5 mg by mouth 2 times daily ) 270 tablet 3    omeprazole (PRILOSEC) 40 MG delayed release capsule Take 1 capsule by mouth daily 90 capsule 3    Blood Pressure Monitoring (BLOOD PRESS MONITOR/M-L CUFF) MISC 1 kit by Does not apply route daily. 1 each 0     No current facility-administered medications for this visit. Allergies   Allergen Reactions    Bee Venom Anaphylaxis    Pcn [Penicillins] Anaphylaxis       Health Maintenance   Topic Date Due    AAA screen  Never done    Diabetic retinal exam  Never done    Shingles Vaccine (2 of 3) 12/22/2017    Annual Wellness Visit (AWV)  Never done    Lipid screen  11/20/2021    DTaP/Tdap/Td vaccine (1 - Tdap) 06/21/2022 (Originally 6[birth year]29/1969)    TSH testing  03/07/2022    Diabetic foot exam  06/21/2022    A1C test (Diabetic or Prediabetic)  06/21/2022    Diabetic microalbuminuria test  06/21/2022    Potassium monitoring  08/20/2022    Creatinine monitoring  08/20/2022    Depression Monitoring  01/19/2023    Colon cancer screen colonoscopy  06/04/2024    Flu vaccine  Completed    Pneumococcal 65+ years Vaccine  Completed    COVID-19 Vaccine  Completed    Hepatitis C screen  Completed    Hepatitis A vaccine  Aged Out    Hib vaccine  Aged Out    Meningococcal (ACWY) vaccine  Aged Out       Subjective:      Review of Systems   Constitutional: Negative for activity change, appetite change, chills, fatigue, fever and unexpected weight change.    HENT: Negative for congestion, ear pain, hearing loss, sinus pressure, sore throat and trouble swallowing. Eyes: Negative for visual disturbance. Respiratory: Negative for cough, shortness of breath and wheezing. Cardiovascular: Negative for chest pain, palpitations and leg swelling. Gastrointestinal: Negative for abdominal pain, blood in stool, constipation, diarrhea, nausea and vomiting. Endocrine: Negative for cold intolerance, heat intolerance, polydipsia, polyphagia and polyuria. Genitourinary: Negative for difficulty urinating, frequency, hematuria and urgency. Skin: Negative for rash. Fingernails developing ridges   Allergic/Immunologic: Negative for environmental allergies. Neurological: Negative for dizziness, weakness, light-headedness and headaches. Psychiatric/Behavioral: Negative for confusion. The patient is not nervous/anxious. Objective:     Physical Exam  Constitutional:       Appearance: Normal appearance. Pulmonary:      Effort: Pulmonary effort is normal.   Neurological:      Mental Status: He is alert and oriented to person, place, and time. There were no vitals taken for this visit. Assessment:       Diagnosis Orders   1. New onset type 2 diabetes mellitus (HCC)  FreeStyle Lancets MISC    blood glucose monitor strips   2. Change in nail appearance               Plan:      Return in about 2 weeks (around 2/8/2022) for diabetes check. Diabetes-he agrees to come in for office appointment for A1c check, reassured home glucose levels not concerning only elevated but need A1c to determine if he would benefit from medication rather than just diet management. Reviewed appropriate diet, encouraged to avoid breakfast cereals on daily basis  Change in nail appearance-discussed likely age-related but will evaluate further at his upcoming office appointment  [place], was evaluated through a synchronous (real-time) audio-video encounter.  The patient (or guardian if applicable) is aware that this is a billable service, which includes applicable co-pays. This Virtual Visit was conducted with patient's (and/or legal guardian's) consent. The visit was conducted pursuant to the emergency declaration under the [facility], [address] [place] [address] authority and the [employer] General Act. Patient identification was verified, and a caregiver was present when appropriate. The patient was located at home in a state where the provider was licensed to provide care. Total time spent for this encounter: Not billed by time    --LINUS Bell CNP on 1/25/2022 at 12:39 PM    An electronic signature was used to authenticate this note. Orders Placed This Encounter   Medications    FreeStyle Lancets MISC     Sig: Test glucose three times daily     Dispense:  100 each     Refill:  3     Please provide insurance approved brand    blood glucose monitor strips     Sig: Test 3 times a day & as needed for symptoms of irregular blood glucose. Dispense:  100 strip     Refill:  5     Brand per patient preference. Patient uses freestyle kit, Please provide insurance approved brand. May round up to next available package size. Patient given educational materials - see patient instructions. Discussed use, benefit, and side effects of prescribed medications. All patientquestions answered. Pt voiced understanding. Reviewed health maintenance. Instructedto continue current medications, diet and exercise. Patient agreed with treatmentplan. Follow up as directed.      Electronicallysigned by LINUS Bell CNP on 1/25/2022 at 12:37 PM

## 2022-02-03 DIAGNOSIS — E78.1 HYPERTRIGLYCERIDEMIA: ICD-10-CM

## 2022-02-03 RX ORDER — EZETIMIBE 10 MG/1
TABLET ORAL
Qty: 90 TABLET | Refills: 3 | Status: SHIPPED | OUTPATIENT
Start: 2022-02-03

## 2022-02-08 ENCOUNTER — OFFICE VISIT (OUTPATIENT)
Dept: PRIMARY CARE CLINIC | Age: 72
End: 2022-02-08
Payer: MEDICARE

## 2022-02-08 ENCOUNTER — HOSPITAL ENCOUNTER (OUTPATIENT)
Age: 72
Setting detail: SPECIMEN
Discharge: HOME | End: 2022-02-08

## 2022-02-08 VITALS
DIASTOLIC BLOOD PRESSURE: 62 MMHG | HEIGHT: 71 IN | WEIGHT: 177 LBS | HEART RATE: 75 BPM | SYSTOLIC BLOOD PRESSURE: 98 MMHG | BODY MASS INDEX: 24.78 KG/M2 | RESPIRATION RATE: 16 BRPM | OXYGEN SATURATION: 98 %

## 2022-02-08 DIAGNOSIS — L60.8 CHANGE IN NAIL APPEARANCE: ICD-10-CM

## 2022-02-08 DIAGNOSIS — E03.9 ACQUIRED HYPOTHYROIDISM: ICD-10-CM

## 2022-02-08 DIAGNOSIS — Z12.5 SCREENING FOR PROSTATE CANCER: ICD-10-CM

## 2022-02-08 DIAGNOSIS — Z98.61 STATUS POST PERCUTANEOUS TRANSLUMINAL CORONARY ANGIOPLASTY: ICD-10-CM

## 2022-02-08 DIAGNOSIS — I95.1 ORTHOSTATIC HYPOTENSION: ICD-10-CM

## 2022-02-08 DIAGNOSIS — Z00.00 ROUTINE GENERAL MEDICAL EXAMINATION AT A HEALTH CARE FACILITY: Primary | ICD-10-CM

## 2022-02-08 DIAGNOSIS — E11.9 NEW ONSET TYPE 2 DIABETES MELLITUS (HCC): ICD-10-CM

## 2022-02-08 DIAGNOSIS — F51.01 PRIMARY INSOMNIA: ICD-10-CM

## 2022-02-08 DIAGNOSIS — F41.9 ANXIETY: ICD-10-CM

## 2022-02-08 DIAGNOSIS — Z95.5 S/P DRUG ELUTING CORONARY STENT PLACEMENT: ICD-10-CM

## 2022-02-08 DIAGNOSIS — Z13.0 SCREENING, ANEMIA, DEFICIENCY, IRON: ICD-10-CM

## 2022-02-08 DIAGNOSIS — E78.2 MIXED HYPERLIPIDEMIA: ICD-10-CM

## 2022-02-08 PROBLEM — R55 SYNCOPE AND COLLAPSE: Status: RESOLVED | Noted: 2021-03-07 | Resolved: 2022-02-08

## 2022-02-08 LAB
ABSOLUTE EOS #: 0.12 K/UL (ref 0–0.44)
ABSOLUTE IMMATURE GRANULOCYTE: 0.03 K/UL (ref 0–0.3)
ABSOLUTE LYMPH #: 1.65 K/UL (ref 1.1–3.7)
ABSOLUTE MONO #: 0.42 K/UL (ref 0.1–1.2)
ALBUMIN SERPL-MCNC: 4.4 G/DL (ref 3.5–5.2)
ALBUMIN/GLOBULIN RATIO: 2.2 (ref 1–2.5)
ALP BLD-CCNC: 50 U/L (ref 40–129)
ALT SERPL-CCNC: 39 U/L (ref 5–41)
ANION GAP SERPL CALCULATED.3IONS-SCNC: 14 MMOL/L (ref 9–17)
AST SERPL-CCNC: 20 U/L
BASOPHILS # BLD: 1 % (ref 0–2)
BASOPHILS ABSOLUTE: 0.04 K/UL (ref 0–0.2)
BILIRUB SERPL-MCNC: 0.81 MG/DL (ref 0.3–1.2)
BUN BLDV-MCNC: 17 MG/DL (ref 8–23)
BUN/CREAT BLD: NORMAL (ref 9–20)
CALCIUM SERPL-MCNC: 9.2 MG/DL (ref 8.6–10.4)
CHLORIDE BLD-SCNC: 103 MMOL/L (ref 98–107)
CHOLESTEROL/HDL RATIO: 2.3
CHOLESTEROL: 116 MG/DL
CO2: 25 MMOL/L (ref 20–31)
CREAT SERPL-MCNC: 1.02 MG/DL (ref 0.7–1.2)
DIFFERENTIAL TYPE: ABNORMAL
EOSINOPHILS RELATIVE PERCENT: 2 % (ref 1–4)
GFR AFRICAN AMERICAN: >60 ML/MIN
GFR NON-AFRICAN AMERICAN: >60 ML/MIN
GFR SERPL CREATININE-BSD FRML MDRD: NORMAL ML/MIN/{1.73_M2}
GFR SERPL CREATININE-BSD FRML MDRD: NORMAL ML/MIN/{1.73_M2}
GLUCOSE BLD-MCNC: 92 MG/DL (ref 70–99)
HBA1C MFR BLD: 5.7 %
HCT VFR BLD CALC: 44.4 % (ref 40.7–50.3)
HDLC SERPL-MCNC: 50 MG/DL
HEMOGLOBIN: 14.3 G/DL (ref 13–17)
IMMATURE GRANULOCYTES: 1 %
LDL CHOLESTEROL: 41 MG/DL (ref 0–130)
LYMPHOCYTES # BLD: 29 % (ref 24–43)
MCH RBC QN AUTO: 30.9 PG (ref 25.2–33.5)
MCHC RBC AUTO-ENTMCNC: 32.2 G/DL (ref 28.4–34.8)
MCV RBC AUTO: 95.9 FL (ref 82.6–102.9)
MONOCYTES # BLD: 7 % (ref 3–12)
NRBC AUTOMATED: 0 PER 100 WBC
PDW BLD-RTO: 12.4 % (ref 11.8–14.4)
PLATELET # BLD: 132 K/UL (ref 138–453)
PLATELET ESTIMATE: ABNORMAL
PMV BLD AUTO: 11.5 FL (ref 8.1–13.5)
POTASSIUM SERPL-SCNC: 4.7 MMOL/L (ref 3.7–5.3)
PROSTATE SPECIFIC ANTIGEN: 2.1 UG/L
RBC # BLD: 4.63 M/UL (ref 4.21–5.77)
RBC # BLD: ABNORMAL 10*6/UL
SEG NEUTROPHILS: 61 % (ref 36–65)
SEGMENTED NEUTROPHILS ABSOLUTE COUNT: 3.48 K/UL (ref 1.5–8.1)
SODIUM BLD-SCNC: 142 MMOL/L (ref 135–144)
TOTAL PROTEIN: 6.4 G/DL (ref 6.4–8.3)
TRIGL SERPL-MCNC: 125 MG/DL
TSH SERPL DL<=0.05 MIU/L-ACNC: 2.3 MIU/L (ref 0.3–5)
VLDLC SERPL CALC-MCNC: NORMAL MG/DL (ref 1–30)
WBC # BLD: 5.7 K/UL (ref 3.5–11.3)
WBC # BLD: ABNORMAL 10*3/UL

## 2022-02-08 PROCEDURE — G0438 PPPS, INITIAL VISIT: HCPCS | Performed by: NURSE PRACTITIONER

## 2022-02-08 PROCEDURE — 83036 HEMOGLOBIN GLYCOSYLATED A1C: CPT | Performed by: NURSE PRACTITIONER

## 2022-02-08 ASSESSMENT — LIFESTYLE VARIABLES
AUDIT-C TOTAL SCORE: INCOMPLETE
HOW OFTEN DO YOU HAVE A DRINK CONTAINING ALCOHOL: 0
AUDIT TOTAL SCORE: INCOMPLETE
HOW OFTEN DO YOU HAVE A DRINK CONTAINING ALCOHOL: NEVER

## 2022-02-08 ASSESSMENT — PATIENT HEALTH QUESTIONNAIRE - PHQ9
SUM OF ALL RESPONSES TO PHQ QUESTIONS 1-9: 0
SUM OF ALL RESPONSES TO PHQ9 QUESTIONS 1 & 2: 0
SUM OF ALL RESPONSES TO PHQ QUESTIONS 1-9: 0
2. FEELING DOWN, DEPRESSED OR HOPELESS: 0
1. LITTLE INTEREST OR PLEASURE IN DOING THINGS: 0
SUM OF ALL RESPONSES TO PHQ QUESTIONS 1-9: 0
SUM OF ALL RESPONSES TO PHQ QUESTIONS 1-9: 0

## 2022-02-08 NOTE — PATIENT INSTRUCTIONS
Personalized Preventive Plan for Sherman Matthews - 2/8/2022  Medicare offers a range of preventive health benefits. Some of the tests and screenings are paid in full while other may be subject to a deductible, co-insurance, and/or copay. Some of these benefits include a comprehensive review of your medical history including lifestyle, illnesses that may run in your family, and various assessments and screenings as appropriate. After reviewing your medical record and screening and assessments performed today your provider may have ordered immunizations, labs, imaging, and/or referrals for you. A list of these orders (if applicable) as well as your Preventive Care list are included within your After Visit Summary for your review. Other Preventive Recommendations:    · A preventive eye exam performed by an eye specialist is recommended every 1-2 years to screen for glaucoma; cataracts, macular degeneration, and other eye disorders. · A preventive dental visit is recommended every 6 months. · Try to get at least 150 minutes of exercise per week or 10,000 steps per day on a pedometer . · Order or download the FREE \"Exercise & Physical Activity: Your Everyday Guide\" from [employer]. Call 7-458.497.1148 or search [employer] online. · You need 8455-6521 mg of calcium and 3390-4243 IU of vitamin D per day. It is possible to meet your calcium requirement with diet alone, but a vitamin D supplement is usually necessary to meet this goal.  · When exposed to the sun, use a sunscreen that protects against both UVA and UVB radiation with an SPF of 30 or greater. Reapply every 2 to 3 hours or after sweating, drying off with a towel, or swimming. · Always wear a seat belt when traveling in a car. Always wear a helmet when riding a bicycle or motorcycle.

## 2022-02-08 NOTE — PROGRESS NOTES
Medicare Annual Wellness Visit  Name: Jose Marking Date: 2022   MRN: [id number] Sex: Male   Age: 70 y.o. Ethnicity: Declined   : 1950 Race: White (non-)      Ollie Mittal is here for Medicare AWV    Screenings for behavioral, psychosocial and functional/safety risks, and cognitive dysfunction are all negative except as indicated below. These results, as well as other patient data from the [address] form, are documented in Flowsheets linked to this Encounter. Allergies   Allergen Reactions    Bee Venom Anaphylaxis    Pcn [Penicillins] Anaphylaxis         Prior to Visit Medications    Medication Sig Taking? Authorizing Provider   ezetimibe (ZETIA) 10 MG tablet TAKE 1 TABLET DAILY Yes LINUS Fischer CNP   FreeStyle Lancets MISC Test glucose three times daily Yes LINUS Diaz CNP   blood glucose monitor strips Test 3 times a day & as needed for symptoms of irregular blood glucose.  Yes LINUS Diaz CNP   levothyroxine (SYNTHROID) 88 MCG tablet TAKE 1 TABLET DAILY Yes LINUS Fischer CNP   traZODone (DESYREL) 100 MG tablet TAKE 1 TABLET NIGHTLY Yes LINUS Fischer CNP   simvastatin (ZOCOR) 80 MG tablet TAKE 1 TABLET DAILY AT BEDTIME Yes LINUS Diaz CNP   famotidine (PEPCID) 40 MG tablet TAKE 1 TABLET EVERY EVENING Yes LINUS Fischer CNP   vitamin B-12 (CYANOCOBALAMIN) 1000 MCG tablet Take 1,000 mcg by mouth daily Yes Historical Provider, MD   aspirin EC 81 MG EC tablet Take 1 tablet by mouth daily Yes Angella Ramirez MD   clopidogrel (PLAVIX) 75 MG tablet Take 1 tablet by mouth daily Yes Angella Ramirez MD   citalopram (CELEXA) 10 MG tablet TAKE 1 TABLET DAILY Yes LINUS Fischer CNP   meloxicam (MOBIC) 15 MG tablet TAKE 1 TABLET DAILY Yes LINUS Fischer CNP   busPIRone (BUSPAR) 10 MG tablet TAKE 1 TABLET THREE TIMES A DAY Yes LINUS Diaz CNP   Elastic Bandages & Supports (JOBST KNEE HIGH COMPRESSION SM) MISC Measure for appropriate sizing Yes LINUS [place] CNP   oxybutynin (DITROPAN XL) 10 MG extended release tablet Take 1 tablet by mouth daily Yes LINUS Stephens CNP   glucose monitoring kit (FREESTYLE) monitoring kit Supply glucometer, lancets and testing strips best covered by insurance Yes LINUS Stephens CNP   fluticasone (FLONASE) 50 MCG/ACT nasal spray 1 spray by Each Nostril route daily as needed for Rhinitis or Allergies Yes Historical Provider, MD   MISC NATURAL PRODUCTS PO Take 1 tablet by mouth daily as needed (gas) (Herbal Bean and Vegetable Gas Relief) Yes Historical Provider, MD   midodrine (PROAMATINE) 2.5 MG tablet TAKE 1 TABLET THREE TIMES A DAY  Patient taking differently: Take 7.5 mg by mouth 2 times daily  Yes Estrella Mcgovern MD   omeprazole (PRILOSEC) 40 MG delayed release capsule Take 1 capsule by mouth daily Yes LINUS Stephens CNP   Blood Pressure Monitoring (BLOOD PRESS MONITOR/M-L CUFF) MISC 1 kit by Does not apply route daily.  Yes Yesica Lerma, DO         Past Medical History:   Diagnosis Date    Anxiety     Depression     Diabetes mellitus ([facility]     Ear infection     history of    Headache(784.0)     migraines    Hyperlipidemia     Insomnia     Osteoarthritis     PTSD (post-traumatic stress disorder)     Unspecified sleep apnea        Past Surgical History:   Procedure Laterality Date    CARDIAC CATHETERIZATION  07/26/2021    FOOT SURGERY Right     spur    TONSILLECTOMY AND ADENOIDECTOMY           Family History   Problem Relation Age of Onset    Thyroid Cancer Mother     Migraines Mother     Cancer Mother     High Cholesterol Father     Heart Disease Father        CareTeam (Including outside providers/suppliers regularly involved in providing care):   Patient Care Team:  LINUS Stephens CNP as PCP - General (Family Nurse Practitioner)  LINUS Stephens 20 minutes 2-3 times per week?: (!) No  Have you lost any weight without trying in the past 3 months?: No  Do you eat only one meal per day?: (!) Yes  Have you seen the dentist within the past year?: N/A - wear dentures  Body mass index: 24.68  Health Habits/Nutrition Interventions:  · no concerns, active around his home, follows healthy diet    Hearing/Vision:  No exam data present  Hearing/Vision  Do you or your family notice any trouble with your hearing that hasn't been managed with hearing aids?: (!) Yes  Do you have difficulty driving, watching TV, or doing any of your daily activities because of your eyesight?: (!) Yes  Have you had an eye exam within the past year?: Yes  Hearing/Vision Interventions:  · Hearing concerns:  patient declines any further evaluation/treatment for hearing issues   · Encouraged to schedule eye exam        Personalized Preventive Plan   Current Health Maintenance Status  Immunization History   Administered Date(s) Administered    COVID-19, Pfizer Purple top, DILUTE for use, 12+ yrs, 30mcg/0.3mL dose 03/16/2021, 04/06/2021, 10/08/2021    Influenza Virus Vaccine 09/16/2014, 10/29/2015, 10/19/2018    Influenza, High Dose (Fluzone 65 yrs and older) 10/29/2015, 09/12/2016, 10/27/2017, 10/17/2018, 09/10/2019, 09/03/2020, 09/24/2021    Influenza, High-dose, [place], 65 yrs +, IM (Fluzone) 09/03/2020, 09/24/2021    Influenza, Quadv, IM, (6 mo and older Fluzone, Flulaval, Fluarix and 3 yrs and older Afluria) 10/27/2017    Pneumococcal Conjugate 13-valent ([id number]) 10/29/2015    Pneumococcal Conjugate 7-valent (Prevnar7) 10/01/2009    Pneumococcal Polysaccharide ([id number]) 09/12/2016, 11/07/2018    Pneumococcal Vaccine 10/01/2009    Zoster Live (Zostavax) 10/27/2017        Health Maintenance   Topic Date Due    AAA screen  Never done    Diabetic retinal exam  Never done    Shingles Vaccine (2 of 3) 12/22/2017    Annual Wellness Visit (AWV)  Never done    Lipid screen 11/20/2021    DTaP/Tdap/Td vaccine (1 - Tdap) 06/21/2022 (Originally 6/29/1969)    TSH testing  03/07/2022    Diabetic foot exam  06/21/2022    Diabetic microalbuminuria test  06/21/2022    Potassium monitoring  08/20/2022    Creatinine monitoring  08/20/2022    Depression Monitoring  01/25/2023    A1C test (Diabetic or Prediabetic)  02/08/2023    Colon cancer screen colonoscopy  06/04/2024    Flu vaccine  Completed    Pneumococcal 65+ years Vaccine  Completed    COVID-19 Vaccine  Completed    Hepatitis C screen  Completed    Hepatitis A vaccine  Aged Out    Hib vaccine  Aged Out    Meningococcal (ACWY) vaccine  Aged Out     Recommendations for [facility] Due: see orders and patient instructions/AVS.  . Recommended screening schedule for the next 5-10 years is provided to the patient in written form: see Patient Lyssa Casarez was seen today for medicare aw. Diagnoses and all orders for this visit:    Routine general medical examination at a health care facility    New onset type 2 diabetes mellitus ([facility]  -     POCT glycosylated hemoglobin (Hb A1C)  -     Comprehensive Metabolic Panel; Future  Remain stable with diet management    Status post percutaneous transluminal coronary angioplasty-recent visit with cardiology, has been stable    S/P drug eluting coronary stent placement    Primary insomnia-well-controlled with trazodone    Acquired hypothyroidism  -     TSH without Reflex; Future    Anxiety    Orthostatic hypotension    Mixed hyperlipidemia  -     Lipid Panel; Future  -     Comprehensive Metabolic Panel; Future    Screening for prostate cancer  -     PSA screening; Future    Screening, anemia, deficiency, iron  -     CBC Auto Differential; Future    Change in nail appearance-discussed ridging in nails very common with aging process.   No need for further evaluation at this time

## 2022-02-09 DIAGNOSIS — F51.01 PRIMARY INSOMNIA: ICD-10-CM

## 2022-02-09 DIAGNOSIS — E11.9 NEW ONSET TYPE 2 DIABETES MELLITUS (HCC): ICD-10-CM

## 2022-02-09 DIAGNOSIS — F41.9 ANXIETY: ICD-10-CM

## 2022-02-09 RX ORDER — GLUCOSAMINE HCL/CHONDROITIN SU 500-400 MG
CAPSULE ORAL
Qty: 100 STRIP | Refills: 5 | Status: SHIPPED | OUTPATIENT
Start: 2022-02-09 | End: 2022-09-27 | Stop reason: SDUPTHER

## 2022-02-09 RX ORDER — BUSPIRONE HYDROCHLORIDE 10 MG/1
TABLET ORAL
Qty: 270 TABLET | Refills: 3 | Status: SHIPPED | OUTPATIENT
Start: 2022-02-09

## 2022-02-09 RX ORDER — LANCETS 28 GAUGE
EACH MISCELLANEOUS
Qty: 100 EACH | Refills: 3 | Status: SHIPPED | OUTPATIENT
Start: 2022-02-09 | End: 2022-09-27 | Stop reason: SDUPTHER

## 2022-03-02 DIAGNOSIS — N32.81 OAB (OVERACTIVE BLADDER): ICD-10-CM

## 2022-03-02 RX ORDER — OXYBUTYNIN CHLORIDE 10 MG/1
TABLET, EXTENDED RELEASE ORAL
Qty: 90 TABLET | Refills: 3 | Status: SHIPPED | OUTPATIENT
Start: 2022-03-02

## 2022-04-22 ENCOUNTER — HOSPITAL ENCOUNTER (OUTPATIENT)
Age: 72
Setting detail: SPECIMEN
Discharge: HOME | End: 2022-04-22

## 2022-04-22 LAB
ALT SERPL-CCNC: 54 U/L (ref 5–41)
AST SERPL-CCNC: 25 U/L
CHOLESTEROL, FASTING: 89 MG/DL
CHOLESTEROL/HDL RATIO: 2.1
HDLC SERPL-MCNC: 42 MG/DL
LDL CHOLESTEROL: 29 MG/DL (ref 0–130)
TRIGLYCERIDE, FASTING: 90 MG/DL

## 2022-05-31 DIAGNOSIS — F41.9 ANXIETY: ICD-10-CM

## 2022-05-31 RX ORDER — CITALOPRAM 10 MG/1
TABLET ORAL
Qty: 90 TABLET | Refills: 3 | Status: SHIPPED | OUTPATIENT
Start: 2022-05-31

## 2022-06-01 DIAGNOSIS — M15.9 PRIMARY OSTEOARTHRITIS INVOLVING MULTIPLE JOINTS: ICD-10-CM

## 2022-06-01 RX ORDER — MELOXICAM 15 MG/1
TABLET ORAL
Qty: 90 TABLET | Refills: 3 | Status: SHIPPED | OUTPATIENT
Start: 2022-06-01

## 2022-08-09 ENCOUNTER — OFFICE VISIT (OUTPATIENT)
Dept: PRIMARY CARE CLINIC | Age: 72
End: 2022-08-09
Payer: MEDICARE

## 2022-08-09 VITALS
OXYGEN SATURATION: 99 % | BODY MASS INDEX: 24.94 KG/M2 | WEIGHT: 178.13 LBS | HEART RATE: 69 BPM | DIASTOLIC BLOOD PRESSURE: 60 MMHG | SYSTOLIC BLOOD PRESSURE: 118 MMHG | HEIGHT: 71 IN

## 2022-08-09 DIAGNOSIS — E78.2 MIXED HYPERLIPIDEMIA: ICD-10-CM

## 2022-08-09 DIAGNOSIS — F51.01 PRIMARY INSOMNIA: ICD-10-CM

## 2022-08-09 DIAGNOSIS — E11.9 NEW ONSET TYPE 2 DIABETES MELLITUS (HCC): Primary | ICD-10-CM

## 2022-08-09 DIAGNOSIS — Z91.81 AT HIGH RISK FOR FALLS: ICD-10-CM

## 2022-08-09 DIAGNOSIS — E03.9 ACQUIRED HYPOTHYROIDISM: ICD-10-CM

## 2022-08-09 DIAGNOSIS — I95.1 ORTHOSTATIC HYPOTENSION: ICD-10-CM

## 2022-08-09 DIAGNOSIS — F32.A DEPRESSIVE DISORDER: ICD-10-CM

## 2022-08-09 DIAGNOSIS — I25.10 CORONARY ARTERY DISEASE INVOLVING NATIVE CORONARY ARTERY OF NATIVE HEART WITHOUT ANGINA PECTORIS: ICD-10-CM

## 2022-08-09 DIAGNOSIS — G89.29 CHRONIC MIDLINE LOW BACK PAIN WITHOUT SCIATICA: ICD-10-CM

## 2022-08-09 DIAGNOSIS — F41.9 ANXIETY: ICD-10-CM

## 2022-08-09 DIAGNOSIS — M15.9 PRIMARY OSTEOARTHRITIS INVOLVING MULTIPLE JOINTS: ICD-10-CM

## 2022-08-09 DIAGNOSIS — M54.50 CHRONIC MIDLINE LOW BACK PAIN WITHOUT SCIATICA: ICD-10-CM

## 2022-08-09 PROCEDURE — 83036 HEMOGLOBIN GLYCOSYLATED A1C: CPT | Performed by: NURSE PRACTITIONER

## 2022-08-09 PROCEDURE — 3044F HG A1C LEVEL LT 7.0%: CPT | Performed by: NURSE PRACTITIONER

## 2022-08-09 PROCEDURE — 99214 OFFICE O/P EST MOD 30 MIN: CPT | Performed by: NURSE PRACTITIONER

## 2022-08-09 PROCEDURE — 1123F ACP DISCUSS/DSCN MKR DOCD: CPT | Performed by: NURSE PRACTITIONER

## 2022-08-09 ASSESSMENT — ENCOUNTER SYMPTOMS
SORE THROAT: 0
SINUS PRESSURE: 0
CONSTIPATION: 0
COUGH: 0
BLOOD IN STOOL: 0
VOMITING: 0
WHEEZING: 0
ABDOMINAL PAIN: 0
DIARRHEA: 0
NAUSEA: 0
TROUBLE SWALLOWING: 0
SHORTNESS OF BREATH: 0

## 2022-08-09 NOTE — PROGRESS NOTES
[facility] PRIMARY CARE  [facility] [address]  [address] [address]  Dept: [phone]  Dept Fax: [phone]    Neela Bach is a 67 y.o. male who presentstoday for his medical conditions/complaints as noted below. Neela Bach is c/o of  Chief Complaint   Patient presents with    Diabetes       HPI:     Here today for follow up  He states still has light headedness in mornings  He states it doesn't bother him the rest of the day  His cardiologist is aware and he states he manages with changing position slowly  Denies any falls  Taking midodrine as directed per cardiologist    Has noticed some memory changes, will be playing game on his phone and have to stop and think what he needs to do next in game  He states it \"always comes back to me but sometimes have to think a second\"  Denies forgetting any major events or appointments  Further discussion he states he is not particularly concerned    Reports trazodone continues to work well most nights for sleep    Checking home glucose level intermittent and states \"has been staying stable\"  Maintains consistent compliance with diabetic type diet  States his back pain is not usually problematic, taking meloxicam daily      Diabetes  He presents for his follow-up diabetic visit. He has type 2 diabetes mellitus. His disease course has been stable. Pertinent negatives for hypoglycemia include no confusion, dizziness, headaches or nervousness/anxiousness. Pertinent negatives for diabetes include no chest pain, no fatigue, no polydipsia, no polyphagia, no polyuria and no weakness. There are no hypoglycemic complications. Current diabetic treatment includes diet. He is compliant with treatment most of the time. His weight is stable. He is following a generally healthy diet. He rarely participates in exercise. An ACE inhibitor/angiotensin II receptor blocker is not being taken.      Hemoglobin A1C (%)   Date Value 2022 5.7   2021 6.0   2021 6.0             ( goal A1C is < 7)   No results found for: LABMICR  LDL Cholesterol (mg/dL)   Date Value   2022 29   2022 41   2020 33     LDL Calculated (mg/dL)   Date Value   2016 49   2014 54       (goal LDL is <100)   AST (U/L)   Date Value   2022 25     ALT (U/L)   Date Value   2022 54 (H)     BUN (mg/dL)   Date Value   2022 17     BP Readings from Last 3 Encounters:   22 118/60   22 98/62   21 (!) 154/81          ([id number])    Past Medical History:   Diagnosis Date    Anxiety     Depression     Diabetes mellitus ([facility] [address]     Ear infection     history of    Headache(784.0)     migraines    Hyperlipidemia     Insomnia     Osteoarthritis     PTSD (post-traumatic stress disorder)     Unspecified sleep apnea       Past Surgical History:   Procedure Laterality Date    CARDIAC CATHETERIZATION  2021    FOOT SURGERY Right     spur    TONSILLECTOMY AND ADENOIDECTOMY         Family History   Problem Relation Age of Onset    Thyroid Cancer Mother     Migraines Mother     Cancer Mother     High Cholesterol Father     Heart Disease Father           Social History     Tobacco Use    Smoking status: Former     Packs/day: 3.00     Years: 20.00     Pack years: 60.00     Types: Cigarettes     Quit date:      Years since quittin.6    Smokeless tobacco: Never   Substance Use Topics    Alcohol use: Not Currently      Current Outpatient Medications   Medication Sig Dispense Refill    meloxicam (MOBIC) 15 MG tablet TAKE 1 TABLET DAILY 90 tablet 3    citalopram (CELEXA) 10 MG tablet TAKE 1 TABLET DAILY 90 tablet 3    oxybutynin (DITROPAN-XL) 10 MG extended release tablet TAKE 1 TABLET DAILY 90 tablet 3    busPIRone (BUSPAR) 10 MG tablet TAKE 1 TABLET THREE TIMES A  tablet 3    FreeStyle Lancets MISC Test glucose three times daily 100 each 3    blood glucose monitor strips Test 3 times a day & as needed for symptoms of irregular blood glucose. 100 strip 5    ezetimibe (ZETIA) 10 MG tablet TAKE 1 TABLET DAILY 90 tablet 3    levothyroxine (SYNTHROID) 88 MCG tablet TAKE 1 TABLET DAILY 90 tablet 3    traZODone (DESYREL) 100 MG tablet TAKE 1 TABLET NIGHTLY 90 tablet 3    simvastatin (ZOCOR) 80 MG tablet TAKE 1 TABLET DAILY AT BEDTIME 90 tablet 3    famotidine (PEPCID) 40 MG tablet TAKE 1 TABLET EVERY EVENING 90 tablet 3    vitamin B-12 (CYANOCOBALAMIN) 1000 MCG tablet Take 1,000 mcg by mouth daily      aspirin EC 81 MG EC tablet Take 1 tablet by mouth daily 30 tablet 5    clopidogrel (PLAVIX) 75 MG tablet Take 1 tablet by mouth daily 30 tablet 5    Elastic Bandages & Supports (JOBST KNEE HIGH COMPRESSION SM) MISC Measure for appropriate sizing 1 each 0    glucose monitoring kit (FREESTYLE) monitoring kit Supply glucometer, lancets and testing strips best covered by insurance 1 kit 0    fluticasone (FLONASE) 50 MCG/ACT nasal spray 1 spray by Each Nostril route daily as needed for Rhinitis or Allergies      MISC NATURAL PRODUCTS PO Take 1 tablet by mouth daily as needed (gas) (Herbal Bean and Vegetable Gas Relief)      midodrine (PROAMATINE) 2.5 MG tablet TAKE 1 TABLET THREE TIMES A DAY (Patient taking differently: Take 7.5 mg by mouth in the morning and 7.5 mg before bedtime.) 270 tablet 3    omeprazole (PRILOSEC) 40 MG delayed release capsule Take 1 capsule by mouth daily 90 capsule 3    Blood Pressure Monitoring (BLOOD PRESS MONITOR/M-L CUFF) MISC 1 kit by Does not apply route daily. 1 each 0     No current facility-administered medications for this visit.      Allergies   Allergen Reactions    Bee Venom Anaphylaxis    Pcn [Penicillins] Anaphylaxis       Health Maintenance   Topic Date Due    Diabetic retinal exam  Never done    DTaP/Tdap/Td vaccine (1 - Tdap) Never done    AAA screen  Never done    Shingles vaccine (2 of 3) 12/22/2017    COVID-19 Vaccine (4 - Booster for Pfizer series) 02/08/2022    Diabetic foot exam 06/21/2022    Diabetic microalbuminuria test  06/21/2022    Flu vaccine (1) 09/01/2022    A1C test (Diabetic or Prediabetic)  02/08/2023    Depression Monitoring  02/08/2023    Annual Wellness Visit (AWV)  02/09/2023    Lipids  04/22/2023    Colorectal Cancer Screen  06/04/2024    Pneumococcal 65+ years Vaccine  Completed    Hepatitis C screen  Completed    Hepatitis A vaccine  Aged Out    Hib vaccine  Aged Out    Meningococcal (ACWY) vaccine  Aged Out       Subjective:      Review of Systems   Constitutional:  Negative for activity change, appetite change, chills, fatigue, fever and unexpected weight change. HENT:  Negative for congestion, ear pain, hearing loss, sinus pressure, sore throat and trouble swallowing. Eyes:  Negative for visual disturbance. Respiratory:  Negative for cough, shortness of breath and wheezing. Cardiovascular:  Negative for chest pain, palpitations and leg swelling. Gastrointestinal:  Negative for abdominal pain, blood in stool, constipation, diarrhea, nausea and vomiting. Endocrine: Negative for cold intolerance, heat intolerance, polydipsia, polyphagia and polyuria. Genitourinary:  Negative for difficulty urinating, frequency, hematuria and urgency. Musculoskeletal:  Positive for arthralgias. Negative for myalgias. Skin:  Negative for rash. Allergic/Immunologic: Negative for environmental allergies. Neurological:  Negative for dizziness, weakness, light-headedness and headaches. Psychiatric/Behavioral:  Negative for confusion. The patient is not nervous/anxious. Objective:     Physical Exam  Constitutional:       Appearance: Normal appearance. He is well-developed. HENT:      Head: Normocephalic. Eyes:      Conjunctiva/sclera: Conjunctivae normal.      Pupils: Pupils are equal, round, and reactive to light. Cardiovascular:      Rate and Rhythm: Normal rate and regular rhythm. Heart sounds: Normal heart sounds. No murmur heard.   Pulmonary: Effort: Pulmonary effort is normal.      Breath sounds: Normal breath sounds. No wheezing. Abdominal:      General: Bowel sounds are normal. There is no distension. Palpations: Abdomen is soft. Musculoskeletal:         General: Normal range of motion. Cervical back: Normal range of motion. Skin:     General: Skin is warm and dry. Neurological:      Mental Status: He is alert and oriented to person, place, and time. Psychiatric:         Behavior: Behavior normal.         Thought Content: Thought content normal.         Judgment: Judgment normal.     /60 (Site: Right Upper Arm, Position: Sitting, Cuff Size: Medium Adult)   Pulse 69   Ht 5' 11\" (1.803 m)   Wt 178 lb 2 oz (80.8 kg)   SpO2 99%   BMI 24.84 kg/m²     Assessment:       Diagnosis Orders   1. New onset type 2 diabetes mellitus (HCC)  POCT glycosylated hemoglobin (Hb A1C)    Hemoglobin A1C      2. Orthostatic hypotension        3. Acquired hypothyroidism        4. Mixed hyperlipidemia        5. Anxiety        6. Primary insomnia        7. Depressive disorder        8. At high risk for falls        9. Primary osteoarthritis involving multiple joints        10. Coronary artery disease involving native coronary artery of native heart without angina pectoris        11. Chronic midline low back pain without sciatica                  Plan:      Return in about 6 months (around 2/9/2023) for diabetes check. Orders Placed This Encounter   Procedures    Hemoglobin A1C     Standing Status:   Future     Standing Expiration Date:   8/9/2023    POCT glycosylated hemoglobin (Hb A1C)     Diabetes-unable to obtain A1c in office today, will check as outpatient.   Home glucose levels are reported as well controlled, reviewed diet which is appropriate  Hypotension, CAD-on midodrine, following with cardiology on regular basis  Hypothyroidism-TSH well-controlled, continue current dose levothyroxine  Anxiety, insomnia, depression-stable at this time on BuSpar and Celexa, trazodone effective for sleep  Osteoarthritis, back pain-stable with daily meloxicam         Patient given educational materials - see patient instructions. Discussed use, benefit, and side effects of prescribed medications. All patientquestions answered. Pt voiced understanding. Reviewed health maintenance. Instructedto continue current medications, diet and exercise. Patient agreed with treatmentplan. Follow up as directed.      Electronicallysigned by LINUS Weiss CNP on 8/9/2022 at 4:37 PM    On the basis of positive falls risk screening, assessment and plan is as follows: home safety tips provided

## 2022-08-12 DIAGNOSIS — K21.9 GASTROESOPHAGEAL REFLUX DISEASE: ICD-10-CM

## 2022-08-12 RX ORDER — FAMOTIDINE 40 MG/1
TABLET, FILM COATED ORAL
Qty: 90 TABLET | Refills: 3 | Status: SHIPPED | OUTPATIENT
Start: 2022-08-12 | End: 2022-10-12 | Stop reason: SDUPTHER

## 2022-08-30 DIAGNOSIS — E78.1 HYPERTRIGLYCERIDEMIA: ICD-10-CM

## 2022-08-30 RX ORDER — SIMVASTATIN 80 MG
TABLET ORAL
Qty: 90 TABLET | Refills: 3 | Status: SHIPPED | OUTPATIENT
Start: 2022-08-30

## 2022-09-27 DIAGNOSIS — E11.9 NEW ONSET TYPE 2 DIABETES MELLITUS (HCC): ICD-10-CM

## 2022-09-27 RX ORDER — GLUCOSAMINE HCL/CHONDROITIN SU 500-400 MG
CAPSULE ORAL
Qty: 100 STRIP | Refills: 5 | Status: SHIPPED | OUTPATIENT
Start: 2022-09-27

## 2022-09-27 RX ORDER — LANCETS 28 GAUGE
EACH MISCELLANEOUS
Qty: 100 EACH | Refills: 3 | Status: SHIPPED | OUTPATIENT
Start: 2022-09-27

## 2022-10-12 DIAGNOSIS — K21.9 GASTROESOPHAGEAL REFLUX DISEASE: ICD-10-CM

## 2022-10-12 RX ORDER — FAMOTIDINE 40 MG/1
TABLET, FILM COATED ORAL
Qty: 90 TABLET | Refills: 3 | Status: SHIPPED | OUTPATIENT
Start: 2022-10-12

## 2022-11-18 DIAGNOSIS — F51.01 PRIMARY INSOMNIA: ICD-10-CM

## 2022-11-18 DIAGNOSIS — F41.9 ANXIETY: ICD-10-CM

## 2022-11-18 RX ORDER — BUSPIRONE HYDROCHLORIDE 10 MG/1
TABLET ORAL
Qty: 270 TABLET | Refills: 3 | Status: SHIPPED | OUTPATIENT
Start: 2022-11-18

## 2022-11-28 ENCOUNTER — TELEPHONE (OUTPATIENT)
Dept: PRIMARY CARE CLINIC | Age: 72
End: 2022-11-28

## 2022-11-28 RX ORDER — TRAZODONE HYDROCHLORIDE 100 MG/1
TABLET ORAL
Qty: 90 TABLET | Refills: 3 | Status: SHIPPED | OUTPATIENT
Start: 2022-11-28

## 2022-12-13 ENCOUNTER — HOSPITAL ENCOUNTER (OUTPATIENT)
Age: 72
Discharge: HOME | End: 2022-12-13
Payer: MEDICARE

## 2022-12-13 DIAGNOSIS — E11.9 NEW ONSET TYPE 2 DIABETES MELLITUS (HCC): ICD-10-CM

## 2022-12-13 LAB
ESTIMATED AVERAGE GLUCOSE: 120 MG/DL
HBA1C MFR BLD: 5.8 % (ref 4–6)

## 2022-12-13 PROCEDURE — 36415 COLL VENOUS BLD VENIPUNCTURE: CPT

## 2022-12-13 PROCEDURE — 83036 HEMOGLOBIN GLYCOSYLATED A1C: CPT

## 2022-12-15 ENCOUNTER — OFFICE VISIT (OUTPATIENT)
Dept: PRIMARY CARE CLINIC | Age: 72
End: 2022-12-15

## 2022-12-15 ENCOUNTER — HOSPITAL ENCOUNTER (OUTPATIENT)
Age: 72
Setting detail: SPECIMEN
Discharge: HOME | End: 2022-12-15

## 2022-12-15 VITALS
HEART RATE: 54 BPM | BODY MASS INDEX: 25.75 KG/M2 | SYSTOLIC BLOOD PRESSURE: 124 MMHG | OXYGEN SATURATION: 100 % | RESPIRATION RATE: 16 BRPM | DIASTOLIC BLOOD PRESSURE: 68 MMHG | WEIGHT: 184.6 LBS

## 2022-12-15 DIAGNOSIS — I67.89 CEREBRAL MICROVASCULAR DISEASE: ICD-10-CM

## 2022-12-15 DIAGNOSIS — R10.9 FLANK PAIN: ICD-10-CM

## 2022-12-15 DIAGNOSIS — R35.0 URINARY FREQUENCY: ICD-10-CM

## 2022-12-15 DIAGNOSIS — R41.3 MEMORY CHANGE: ICD-10-CM

## 2022-12-15 DIAGNOSIS — I95.1 ORTHOSTATIC HYPOTENSION: ICD-10-CM

## 2022-12-15 DIAGNOSIS — E03.9 ACQUIRED HYPOTHYROIDISM: ICD-10-CM

## 2022-12-15 DIAGNOSIS — E11.9 TYPE 2 DIABETES MELLITUS WITHOUT COMPLICATION, WITHOUT LONG-TERM CURRENT USE OF INSULIN (HCC): Primary | ICD-10-CM

## 2022-12-15 DIAGNOSIS — F51.01 PRIMARY INSOMNIA: ICD-10-CM

## 2022-12-15 DIAGNOSIS — E78.2 MIXED HYPERLIPIDEMIA: ICD-10-CM

## 2022-12-15 DIAGNOSIS — I25.10 CORONARY ARTERY DISEASE INVOLVING NATIVE CORONARY ARTERY OF NATIVE HEART WITHOUT ANGINA PECTORIS: ICD-10-CM

## 2022-12-15 LAB
BILIRUBIN, POC: NORMAL
BLOOD URINE, POC: NEGATIVE
CLARITY, POC: CLEAR
COLOR, POC: YELLOW
GLUCOSE URINE, POC: NEGATIVE
KETONES, POC: NORMAL
LEUKOCYTE EST, POC: NEGATIVE
NITRITE, POC: NEGATIVE
PH, POC: 5.5
PROTEIN, POC: NEGATIVE
SPECIFIC GRAVITY, POC: 1.02
UROBILINOGEN, POC: 1

## 2022-12-15 RX ORDER — MIDODRINE HYDROCHLORIDE 10 MG/1
TABLET ORAL
COMMUNITY
Start: 2022-10-07

## 2022-12-15 ASSESSMENT — ENCOUNTER SYMPTOMS
WHEEZING: 0
SHORTNESS OF BREATH: 0
VOMITING: 0
CONSTIPATION: 0
DIARRHEA: 0
SORE THROAT: 0
COUGH: 0
BLOOD IN STOOL: 0
TROUBLE SWALLOWING: 0
NAUSEA: 0
ABDOMINAL PAIN: 0
SINUS PRESSURE: 0

## 2022-12-15 NOTE — PROGRESS NOTES
[address] PRIMARY CARE  [address]  [address] [address]  Dept: [phone]  Dept Fax: [phone]    Natalia Epley is a 67 y.o. male who presentstoday for his medical conditions/complaints as noted below. Natalia Epley is c/o of  Chief Complaint   Patient presents with    Diabetes    Dementia     Discuss what to look for         HPI:     Here today for follow up  Presents with his wife today  He states has been feeling well in general but has been having bilateral mild flank pain lately  Also noticing increased frequency but he then states he drinks a lot of water  Denies any burning or discomfort with urination, denies any suprapubic pain, denies any incontinence  States has been taking his oxybutynin    Wife voicing concern about increased forgetfulness  Wife states he does not pay bills or drive any more so has not noticed any change in these activities  He does forget to turn of stove at times so she has removed knobs from stove  She states he used to take things apart and rebuild them but now when he does these things he no longer remembers how to resassemble it  Frequently asks for things to be repeated  Patient also admits he knows that he is forgetful  She states they were told at one time there was something abnormal on her brain scan  They have never seen neurology    He does see cardiology, last appointment was a few months ago, has been stable on his midodrine  Reports his insomnia remains well controlled with nightly trazodone    Diabetes  He presents for his follow-up diabetic visit. He has type 2 diabetes mellitus. His disease course has been stable. There are no hypoglycemic associated symptoms. Pertinent negatives for hypoglycemia include no confusion, dizziness, headaches or nervousness/anxiousness. Pertinent negatives for diabetes include no chest pain, no fatigue, no polydipsia, no polyphagia, no polyuria and no weakness. Current diabetic treatment includes diet. He is compliant with treatment most of the time. His weight is stable. He is following a generally healthy diet. He participates in exercise intermittently. His home blood glucose trend is fluctuating minimally. An ACE inhibitor/angiotensin II receptor blocker is not being taken.      Hemoglobin A1C (%)   Date Value   2022 5.8   2022 5.7   2021 6.0             ( goal A1C is < 7)   No results found for: LABMICR  LDL Cholesterol (mg/dL)   Date Value   2022 29   2022 41   2020 33     LDL Calculated (mg/dL)   Date Value   2016 49   2014 54       (goal LDL is <100)   AST (U/L)   Date Value   2022 25     ALT (U/L)   Date Value   2022 54 (H)     BUN (mg/dL)   Date Value   2022 17     BP Readings from Last 3 Encounters:   12/15/22 124/68   22 118/60   22 98/62          ([id number])    Past Medical History:   Diagnosis Date    Anxiety     Depression     Diabetes mellitus ([facility] [address]     Ear infection     history of    Headache(784.0)     migraines    Hyperlipidemia     Insomnia     Osteoarthritis     PTSD (post-traumatic stress disorder)     Unspecified sleep apnea       Past Surgical History:   Procedure Laterality Date    CARDIAC CATHETERIZATION  2021    FOOT SURGERY Right     spur    TONSILLECTOMY AND ADENOIDECTOMY         Family History   Problem Relation Age of Onset    Thyroid Cancer Mother     Migraines Mother     Cancer Mother     High Cholesterol Father     Heart Disease Father           Social History     Tobacco Use    Smoking status: Former     Packs/day: 3.00     Years: 20.00     Pack years: 60.00     Types: Cigarettes     Quit date:      Years since quittin.9    Smokeless tobacco: Never   Substance Use Topics    Alcohol use: Not Currently      Current Outpatient Medications   Medication Sig Dispense Refill    traZODone (DESYREL) 100 MG tablet TAKE 1 TABLET NIGHTLY 90 tablet 3 busPIRone (BUSPAR) 10 MG tablet TAKE 1 TABLET THREE TIMES A  tablet 3    famotidine (PEPCID) 40 MG tablet TAKE 1 TABLET EVERY EVENING 90 tablet 3    FreeStyle Lancets MISC Test glucose three times daily 100 each 3    blood glucose monitor strips Test 3 times a day & as needed for symptoms of irregular blood glucose. 100 strip 5    simvastatin (ZOCOR) 80 MG tablet TAKE 1 TABLET DAILY AT BEDTIME 90 tablet 3    meloxicam (MOBIC) 15 MG tablet TAKE 1 TABLET DAILY 90 tablet 3    citalopram (CELEXA) 10 MG tablet TAKE 1 TABLET DAILY 90 tablet 3    oxybutynin (DITROPAN-XL) 10 MG extended release tablet TAKE 1 TABLET DAILY 90 tablet 3    ezetimibe (ZETIA) 10 MG tablet TAKE 1 TABLET DAILY 90 tablet 3    levothyroxine (SYNTHROID) 88 MCG tablet TAKE 1 TABLET DAILY 90 tablet 3    vitamin B-12 (CYANOCOBALAMIN) 1000 MCG tablet Take 1,000 mcg by mouth daily      aspirin EC 81 MG EC tablet Take 1 tablet by mouth daily 30 tablet 5    clopidogrel (PLAVIX) 75 MG tablet Take 1 tablet by mouth daily 30 tablet 5    Elastic Bandages & Supports (JOBST KNEE HIGH COMPRESSION SM) MISC Measure for appropriate sizing 1 each 0    glucose monitoring kit (FREESTYLE) monitoring kit Supply glucometer, lancets and testing strips best covered by insurance 1 kit 0    fluticasone (FLONASE) 50 MCG/ACT nasal spray 1 spray by Each Nostril route daily as needed for Rhinitis or Allergies      MISC NATURAL PRODUCTS PO Take 1 tablet by mouth daily as needed (gas) (Herbal Bean and Vegetable Gas Relief)      omeprazole (PRILOSEC) 40 MG delayed release capsule Take 1 capsule by mouth daily 90 capsule 3    Blood Pressure Monitoring (BLOOD PRESS MONITOR/M-L CUFF) MISC 1 kit by Does not apply route daily. 1 each 0    midodrine (PROAMATINE) 10 MG tablet        No current facility-administered medications for this visit.      Allergies   Allergen Reactions    Bee Venom Anaphylaxis    Pcn [Penicillins] Anaphylaxis       Health Maintenance   Topic Date Due Diabetic retinal exam  Never done    DTaP/Tdap/Td vaccine (1 - Tdap) Never done    AAA screen  Never done    Shingles vaccine (2 of 3) 12/22/2017    Diabetic foot exam  06/21/2022    Diabetic microalbuminuria test  06/21/2022    Depression Monitoring  02/08/2023    Annual Wellness Visit (AWV)  02/09/2023    Lipids  04/22/2023    A1C test (Diabetic or Prediabetic)  12/13/2023    Colorectal Cancer Screen  06/04/2024    Flu vaccine  Completed    Pneumococcal 65+ years Vaccine  Completed    COVID-19 Vaccine  Completed    Hepatitis C screen  Completed    Hepatitis A vaccine  Aged Out    Hib vaccine  Aged Out    Meningococcal (ACWY) vaccine  Aged Out       Subjective:      Review of Systems   Constitutional:  Negative for activity change, appetite change, chills, fatigue, fever and unexpected weight change. HENT:  Negative for congestion, ear pain, hearing loss, sinus pressure, sore throat and trouble swallowing. Eyes:  Negative for visual disturbance. Respiratory:  Negative for cough, shortness of breath and wheezing. Cardiovascular:  Negative for chest pain, palpitations and leg swelling. Gastrointestinal:  Negative for abdominal pain, blood in stool, constipation, diarrhea, nausea and vomiting. Endocrine: Negative for cold intolerance, heat intolerance, polydipsia, polyphagia and polyuria. Genitourinary:  Positive for flank pain (Mild) and frequency. Negative for difficulty urinating, hematuria and urgency. Musculoskeletal:  Negative for arthralgias and myalgias. Skin:  Negative for rash. Allergic/Immunologic: Negative for environmental allergies. Neurological:  Negative for dizziness, weakness, light-headedness and headaches. Psychiatric/Behavioral:  Positive for sleep disturbance. Negative for confusion. The patient is not nervous/anxious. Memory changes     Objective:     Physical Exam  Constitutional:       Appearance: Normal appearance. He is well-developed.    HENT:      Head: Normocephalic. Eyes:      Conjunctiva/sclera: Conjunctivae normal.      Pupils: Pupils are equal, round, and reactive to light. Cardiovascular:      Rate and Rhythm: Normal rate and regular rhythm. Heart sounds: Normal heart sounds. No murmur heard. Pulmonary:      Effort: Pulmonary effort is normal.      Breath sounds: Normal breath sounds. No wheezing. Abdominal:      General: Bowel sounds are normal. There is no distension. Palpations: Abdomen is soft. Musculoskeletal:         General: Normal range of motion. Cervical back: Normal range of motion. Skin:     General: Skin is warm and dry. Neurological:      Mental Status: He is alert and oriented to person, place, and time. Psychiatric:         Behavior: Behavior normal.         Thought Content: Thought content normal.         Judgment: Judgment normal.     /68   Pulse 54   Resp 16   Wt 184 lb 9.6 oz (83.7 kg)   SpO2 100%   BMI 25.75 kg/m²     Assessment:       Diagnosis Orders   1. Type 2 diabetes mellitus without complication, without long-term current use of insulin ([facility] [address]        2. Urinary frequency  POCT Urinalysis no Micro    Culture, Urine      3. Flank pain  POCT Urinalysis no Micro    Culture, Urine      4. Orthostatic hypotension        5. Coronary artery disease involving native coronary artery of native heart without angina pectoris        6. Acquired hypothyroidism        7. Mixed hyperlipidemia        8. Primary insomnia        9. Memory change        10. Cerebral microvascular disease                  Plan:      Return in about 6 months (around 6/15/2023) for diabetes check, hypertension check, depression/anxiety. Diabetes-A1c remains well controlled with diet, reviewed appropriate choices, encouraged regular activity/exercise  Urinary frequency, flank pain-in office UA appears negative, will send for culture.   We will continue oxybutynin, may consider dose increase pending results of culture  Orthostatic hypotension, CAD-recent visit with cardiology reviewed, he has continued midodrine and has been stable  Hypothyroidism-TSH therapeutic on current dose levothyroxine  Hyperlipidemia-well-controlled on statin  Insomnia-well-controlled with nightly trazodone  Memory changes, cerebral microvascular disease-reviewed CT from March 2021 with patient and wife, is described memory changes explainable due to microvascular disease. Offered referral to neurology but they declined at this time. Will monitor and will call if symptoms worsen. He does not desire any repeat scan at this time  Orders Placed This Encounter   Procedures    Culture, Urine     Standing Status:   Future     Number of Occurrences:   1     Standing Expiration Date:   12/15/2023     Order Specific Question:   Specify (ex-cath, midstream, cysto, etc)? Answer:   midstream    POCT Urinalysis no Micro      No orders of the defined types were placed in this encounter. Patient given educational materials - see patient instructions. Discussed use, benefit, and side effects of prescribed medications. All patientquestions answered. Pt voiced understanding. Reviewed health maintenance. Instructedto continue current medications, diet and exercise. Patient agreed with treatmentplan. Follow up as directed.      Electronicallysigned by LINUS Srinivasan CNP on 12/15/2022 at 4:30 PM

## 2022-12-16 LAB
CULTURE: NO GROWTH
SPECIMEN DESCRIPTION: NORMAL

## 2023-01-08 DIAGNOSIS — E11.9 NEW ONSET TYPE 2 DIABETES MELLITUS (HCC): ICD-10-CM

## 2023-01-09 DIAGNOSIS — E11.9 NEW ONSET TYPE 2 DIABETES MELLITUS (HCC): ICD-10-CM

## 2023-01-09 RX ORDER — LANCETS 28 GAUGE
EACH MISCELLANEOUS
Qty: 100 EACH | Refills: 0 | Status: SHIPPED | OUTPATIENT
Start: 2023-01-09

## 2023-01-09 RX ORDER — GLUCOSAMINE HCL/CHONDROITIN SU 500-400 MG
CAPSULE ORAL
Qty: 100 STRIP | Refills: 5 | Status: SHIPPED | OUTPATIENT
Start: 2023-01-09

## 2023-01-12 ENCOUNTER — TELEPHONE (OUTPATIENT)
Dept: PRIMARY CARE CLINIC | Age: 73
End: 2023-01-12

## 2023-01-12 ENCOUNTER — E-VISIT (OUTPATIENT)
Dept: PRIMARY CARE CLINIC | Age: 73
End: 2023-01-12
Payer: MEDICARE

## 2023-01-12 DIAGNOSIS — R11.0 NAUSEA: Primary | ICD-10-CM

## 2023-01-12 DIAGNOSIS — U07.1 COVID-19: Primary | ICD-10-CM

## 2023-01-12 PROCEDURE — 99422 OL DIG E/M SVC 11-20 MIN: CPT | Performed by: NURSE PRACTITIONER

## 2023-01-12 NOTE — TELEPHONE ENCOUNTER
Sent him a message but please also call-he is to start Paxlovid, need to be his atorvastatin while taking this and for 7 days after  Thanks

## 2023-01-12 NOTE — TELEPHONE ENCOUNTER
Spoke with wife,  has same thing she has: hot/cold, ears ringing, joints hurt, nausea, and some stomach pain. Wife did e visit with Radha Louise and was sent in Clindamycin. She is requesting same for , Madai Merino suggested he do an e visit also, wife states she will try to help him do that.

## 2023-01-12 NOTE — PROGRESS NOTES
Amanda Orellana (1950) initiated an asynchronous digital communication through [address] [place] [address]. HPI: per patient questionnaire     Exam: not applicable    Diagnoses and all orders for this visit:  Diagnoses and all orders for this visit:    COVID-19  -     nirmatrelvir/ritonavir (PAXLOVID) 20 x 150 MG & 10 x 100MG TBPK; Take 3 tablets (two 150 mg nirmatrelvir and one 100 mg ritonavir tablets) by mouth every 12 hours for 5 days. Time: EV2 - 11-20 minutes were spent on the digital evaluation and management of this patient. Shayy Yanez, APRN - CNP     Evisit reviewed. He is up to date on COVID vaccine but described sx are concerning. Advised to complete home test and call office with results.   Patient called later in day to report positive for covid, will start antiviral tx

## 2023-01-23 DIAGNOSIS — E11.9 NEW ONSET TYPE 2 DIABETES MELLITUS (HCC): ICD-10-CM

## 2023-01-23 DIAGNOSIS — E03.9 ACQUIRED HYPOTHYROIDISM: ICD-10-CM

## 2023-01-23 DIAGNOSIS — K21.9 GASTROESOPHAGEAL REFLUX DISEASE: ICD-10-CM

## 2023-01-23 RX ORDER — LANCETS 28 GAUGE
EACH MISCELLANEOUS
Qty: 100 EACH | Refills: 0 | Status: SHIPPED | OUTPATIENT
Start: 2023-01-23

## 2023-01-23 RX ORDER — LEVOTHYROXINE SODIUM 88 UG/1
TABLET ORAL
Qty: 90 TABLET | Refills: 3 | Status: SHIPPED | OUTPATIENT
Start: 2023-01-23

## 2023-01-23 RX ORDER — FAMOTIDINE 40 MG/1
TABLET, FILM COATED ORAL
Qty: 90 TABLET | Refills: 3 | Status: SHIPPED | OUTPATIENT
Start: 2023-01-23

## 2023-01-23 RX ORDER — GLUCOSAMINE HCL/CHONDROITIN SU 500-400 MG
CAPSULE ORAL
Qty: 100 STRIP | Refills: 5 | Status: SHIPPED | OUTPATIENT
Start: 2023-01-23

## 2023-01-23 NOTE — TELEPHONE ENCOUNTER
Last Visit Date: 1/12/2023   Next Visit Date: 6/16/2023     The patient states that he changed insurances and that he needs a 10 day supply of the Levothyroxine to be sent to the [facility] and then a 90 day supply sent to [facility] order pharmacy. He also states that he needs the blood glucose Test strips and lancets sent to [facility].      Medication pended for approval.

## 2023-02-06 DIAGNOSIS — M15.9 PRIMARY OSTEOARTHRITIS INVOLVING MULTIPLE JOINTS: ICD-10-CM

## 2023-02-06 DIAGNOSIS — E78.1 HYPERTRIGLYCERIDEMIA: ICD-10-CM

## 2023-02-06 RX ORDER — EZETIMIBE 10 MG/1
TABLET ORAL
Qty: 90 TABLET | Refills: 3 | Status: SHIPPED | OUTPATIENT
Start: 2023-02-06

## 2023-02-06 RX ORDER — MELOXICAM 15 MG/1
TABLET ORAL
Qty: 90 TABLET | Refills: 3 | Status: SHIPPED | OUTPATIENT
Start: 2023-02-06

## 2023-02-22 RX ORDER — MIDODRINE HYDROCHLORIDE 10 MG/1
10 TABLET ORAL 3 TIMES DAILY PRN
Qty: 270 TABLET | Refills: 3 | Status: SHIPPED | OUTPATIENT
Start: 2023-02-22

## 2023-03-02 DIAGNOSIS — E78.1 HYPERTRIGLYCERIDEMIA: ICD-10-CM

## 2023-03-02 DIAGNOSIS — F41.9 ANXIETY: ICD-10-CM

## 2023-03-02 DIAGNOSIS — N32.81 OAB (OVERACTIVE BLADDER): ICD-10-CM

## 2023-03-02 DIAGNOSIS — F51.01 PRIMARY INSOMNIA: ICD-10-CM

## 2023-03-02 RX ORDER — BUSPIRONE HYDROCHLORIDE 10 MG/1
TABLET ORAL
Qty: 270 TABLET | Refills: 3 | Status: SHIPPED | OUTPATIENT
Start: 2023-03-02

## 2023-03-02 RX ORDER — TRAZODONE HYDROCHLORIDE 100 MG/1
TABLET ORAL
Qty: 90 TABLET | Refills: 3 | Status: SHIPPED | OUTPATIENT
Start: 2023-03-02

## 2023-03-02 RX ORDER — CLOPIDOGREL BISULFATE 75 MG/1
75 TABLET ORAL DAILY
Qty: 90 TABLET | Refills: 3 | Status: SHIPPED | OUTPATIENT
Start: 2023-03-02

## 2023-03-02 RX ORDER — SIMVASTATIN 80 MG
TABLET ORAL
Qty: 90 TABLET | Refills: 3 | Status: SHIPPED | OUTPATIENT
Start: 2023-03-02

## 2023-03-02 RX ORDER — CITALOPRAM 10 MG/1
TABLET ORAL
Qty: 90 TABLET | Refills: 3 | Status: SHIPPED | OUTPATIENT
Start: 2023-03-02

## 2023-03-02 RX ORDER — OXYBUTYNIN CHLORIDE 10 MG/1
TABLET, EXTENDED RELEASE ORAL
Qty: 90 TABLET | Refills: 3 | Status: SHIPPED | OUTPATIENT
Start: 2023-03-02

## 2023-06-05 DIAGNOSIS — E11.9 NEW ONSET TYPE 2 DIABETES MELLITUS (HCC): ICD-10-CM

## 2023-06-05 RX ORDER — LANCETS 28 GAUGE
EACH MISCELLANEOUS
Qty: 100 EACH | Refills: 5 | Status: SHIPPED | OUTPATIENT
Start: 2023-06-05

## 2023-07-14 ENCOUNTER — HOSPITAL ENCOUNTER (OUTPATIENT)
Age: 73
Setting detail: SPECIMEN
Discharge: HOME | End: 2023-07-14

## 2023-07-14 ENCOUNTER — OFFICE VISIT (OUTPATIENT)
Dept: PRIMARY CARE CLINIC | Age: 73
End: 2023-07-14
Payer: MEDICARE

## 2023-07-14 VITALS
WEIGHT: 191.6 LBS | DIASTOLIC BLOOD PRESSURE: 70 MMHG | BODY MASS INDEX: 26.82 KG/M2 | SYSTOLIC BLOOD PRESSURE: 118 MMHG | HEART RATE: 76 BPM | HEIGHT: 71 IN | OXYGEN SATURATION: 96 %

## 2023-07-14 DIAGNOSIS — E78.1 HYPERTRIGLYCERIDEMIA: ICD-10-CM

## 2023-07-14 DIAGNOSIS — J30.9 CHRONIC ALLERGIC RHINITIS: ICD-10-CM

## 2023-07-14 DIAGNOSIS — F51.01 PRIMARY INSOMNIA: ICD-10-CM

## 2023-07-14 DIAGNOSIS — K59.00 CONSTIPATION, UNSPECIFIED CONSTIPATION TYPE: ICD-10-CM

## 2023-07-14 DIAGNOSIS — Z12.5 SCREENING FOR MALIGNANT NEOPLASM OF PROSTATE: ICD-10-CM

## 2023-07-14 DIAGNOSIS — Z13.0 SCREENING, ANEMIA, DEFICIENCY, IRON: ICD-10-CM

## 2023-07-14 DIAGNOSIS — E03.9 ACQUIRED HYPOTHYROIDISM: ICD-10-CM

## 2023-07-14 DIAGNOSIS — F41.9 ANXIETY: ICD-10-CM

## 2023-07-14 DIAGNOSIS — I95.1 ORTHOSTATIC HYPOTENSION: ICD-10-CM

## 2023-07-14 DIAGNOSIS — E11.9 TYPE 2 DIABETES MELLITUS WITHOUT COMPLICATION, WITHOUT LONG-TERM CURRENT USE OF INSULIN (HCC): ICD-10-CM

## 2023-07-14 DIAGNOSIS — M15.9 PRIMARY OSTEOARTHRITIS INVOLVING MULTIPLE JOINTS: ICD-10-CM

## 2023-07-14 DIAGNOSIS — E11.9 TYPE 2 DIABETES MELLITUS WITHOUT COMPLICATION, WITHOUT LONG-TERM CURRENT USE OF INSULIN (HCC): Primary | ICD-10-CM

## 2023-07-14 LAB
ALBUMIN SERPL-MCNC: 4.4 G/DL (ref 3.5–5.2)
ALBUMIN/GLOB SERPL: 2.1 {RATIO} (ref 1–2.5)
ALP SERPL-CCNC: 48 U/L (ref 40–129)
ALT SERPL-CCNC: 23 U/L (ref 5–41)
ANION GAP SERPL CALCULATED.3IONS-SCNC: 14 MMOL/L (ref 9–17)
AST SERPL-CCNC: 19 U/L
BASOPHILS # BLD: 0.08 K/UL (ref 0–0.2)
BASOPHILS NFR BLD: 1 % (ref 0–2)
BILIRUB SERPL-MCNC: 1 MG/DL (ref 0.3–1.2)
BUN SERPL-MCNC: 15 MG/DL (ref 8–23)
CALCIUM SERPL-MCNC: 9.4 MG/DL (ref 8.6–10.4)
CHLORIDE SERPL-SCNC: 104 MMOL/L (ref 98–107)
CHOLEST SERPL-MCNC: 98 MG/DL
CHOLESTEROL/HDL RATIO: 2.5
CO2 SERPL-SCNC: 23 MMOL/L (ref 20–31)
CREAT SERPL-MCNC: 1.4 MG/DL (ref 0.7–1.2)
EOSINOPHIL # BLD: 0.09 K/UL (ref 0–0.44)
EOSINOPHILS RELATIVE PERCENT: 1 % (ref 1–4)
ERYTHROCYTE [DISTWIDTH] IN BLOOD BY AUTOMATED COUNT: 12.3 % (ref 11.8–14.4)
GFR SERPL CREATININE-BSD FRML MDRD: 53 ML/MIN/1.73M2
GLUCOSE SERPL-MCNC: 111 MG/DL (ref 70–99)
HBA1C MFR BLD: 5.8 %
HCT VFR BLD AUTO: 45.4 % (ref 40.7–50.3)
HDLC SERPL-MCNC: 40 MG/DL
HGB BLD-MCNC: 15 G/DL (ref 13–17)
IMM GRANULOCYTES # BLD AUTO: 0.04 K/UL (ref 0–0.3)
IMM GRANULOCYTES NFR BLD: 1 %
LDLC SERPL CALC-MCNC: 34 MG/DL (ref 0–130)
LYMPHOCYTES # BLD: 26 % (ref 24–43)
LYMPHOCYTES NFR BLD: 1.98 K/UL (ref 1.1–3.7)
MCH RBC QN AUTO: 30.9 PG (ref 25.2–33.5)
MCHC RBC AUTO-ENTMCNC: 33 G/DL (ref 28.4–34.8)
MCV RBC AUTO: 93.6 FL (ref 82.6–102.9)
MONOCYTES NFR BLD: 0.6 K/UL (ref 0.1–1.2)
MONOCYTES NFR BLD: 8 % (ref 3–12)
NEUTROPHILS NFR BLD: 63 % (ref 36–65)
NEUTS SEG NFR BLD: 4.95 K/UL (ref 1.5–8.1)
NRBC BLD-RTO: 0 PER 100 WBC
PLATELET # BLD AUTO: 150 K/UL (ref 138–453)
PMV BLD AUTO: 11.7 FL (ref 8.1–13.5)
POTASSIUM SERPL-SCNC: 4.7 MMOL/L (ref 3.7–5.3)
PROT SERPL-MCNC: 6.5 G/DL (ref 6.4–8.3)
PSA SERPL-MCNC: 3.16 NG/ML
RBC # BLD AUTO: 4.85 M/UL (ref 4.21–5.77)
SODIUM SERPL-SCNC: 141 MMOL/L (ref 135–144)
TRIGL SERPL-MCNC: 119 MG/DL
TSH SERPL DL<=0.05 MIU/L-ACNC: 2.16 UIU/ML (ref 0.3–5)
WBC OTHER # BLD: 7.7 K/UL (ref 3.5–11.3)

## 2023-07-14 PROCEDURE — 3078F DIAST BP <80 MM HG: CPT | Performed by: NURSE PRACTITIONER

## 2023-07-14 PROCEDURE — 1036F TOBACCO NON-USER: CPT | Performed by: NURSE PRACTITIONER

## 2023-07-14 PROCEDURE — G8417 CALC BMI ABV UP PARAM F/U: HCPCS | Performed by: NURSE PRACTITIONER

## 2023-07-14 PROCEDURE — 3044F HG A1C LEVEL LT 7.0%: CPT | Performed by: NURSE PRACTITIONER

## 2023-07-14 PROCEDURE — 1123F ACP DISCUSS/DSCN MKR DOCD: CPT | Performed by: NURSE PRACTITIONER

## 2023-07-14 PROCEDURE — 83037 HB GLYCOSYLATED A1C HOME DEV: CPT | Performed by: NURSE PRACTITIONER

## 2023-07-14 PROCEDURE — 3074F SYST BP LT 130 MM HG: CPT | Performed by: NURSE PRACTITIONER

## 2023-07-14 PROCEDURE — G8427 DOCREV CUR MEDS BY ELIG CLIN: HCPCS | Performed by: NURSE PRACTITIONER

## 2023-07-14 PROCEDURE — 3017F COLORECTAL CA SCREEN DOC REV: CPT | Performed by: NURSE PRACTITIONER

## 2023-07-14 PROCEDURE — 99214 OFFICE O/P EST MOD 30 MIN: CPT | Performed by: NURSE PRACTITIONER

## 2023-07-14 PROCEDURE — 2022F DILAT RTA XM EVC RTNOPTHY: CPT | Performed by: NURSE PRACTITIONER

## 2023-07-14 RX ORDER — FLUTICASONE PROPIONATE 50 MCG
1 SPRAY, SUSPENSION (ML) NASAL DAILY
Qty: 32 G | Refills: 3 | Status: SHIPPED | OUTPATIENT
Start: 2023-07-14

## 2023-07-14 RX ORDER — SENNA PLUS 8.6 MG/1
TABLET ORAL
Qty: 180 TABLET | Refills: 2 | Status: SHIPPED | OUTPATIENT
Start: 2023-07-14

## 2023-07-14 SDOH — ECONOMIC STABILITY: FOOD INSECURITY: WITHIN THE PAST 12 MONTHS, YOU WORRIED THAT YOUR FOOD WOULD RUN OUT BEFORE YOU GOT MONEY TO BUY MORE.: NEVER TRUE

## 2023-07-14 SDOH — ECONOMIC STABILITY: INCOME INSECURITY: HOW HARD IS IT FOR YOU TO PAY FOR THE VERY BASICS LIKE FOOD, HOUSING, MEDICAL CARE, AND HEATING?: NOT HARD AT ALL

## 2023-07-14 SDOH — ECONOMIC STABILITY: FOOD INSECURITY: WITHIN THE PAST 12 MONTHS, THE FOOD YOU BOUGHT JUST DIDN'T LAST AND YOU DIDN'T HAVE MONEY TO GET MORE.: NEVER TRUE

## 2023-07-14 ASSESSMENT — ENCOUNTER SYMPTOMS
SHORTNESS OF BREATH: 0
TROUBLE SWALLOWING: 0
VOMITING: 0
DIARRHEA: 0
COUGH: 0
SORE THROAT: 0
ABDOMINAL PAIN: 0
CONSTIPATION: 1
ANAL BLEEDING: 0
NAUSEA: 0
BLOOD IN STOOL: 0
WHEEZING: 0
SINUS PRESSURE: 0

## 2023-07-14 ASSESSMENT — PATIENT HEALTH QUESTIONNAIRE - PHQ9
1. LITTLE INTEREST OR PLEASURE IN DOING THINGS: 0
3. TROUBLE FALLING OR STAYING ASLEEP: 0
SUM OF ALL RESPONSES TO PHQ9 QUESTIONS 1 & 2: 0
9. THOUGHTS THAT YOU WOULD BE BETTER OFF DEAD, OR OF HURTING YOURSELF: 0
5. POOR APPETITE OR OVEREATING: 0
SUM OF ALL RESPONSES TO PHQ QUESTIONS 1-9: 0
7. TROUBLE CONCENTRATING ON THINGS, SUCH AS READING THE NEWSPAPER OR WATCHING TELEVISION: 0
8. MOVING OR SPEAKING SO SLOWLY THAT OTHER PEOPLE COULD HAVE NOTICED. OR THE OPPOSITE, BEING SO FIGETY OR RESTLESS THAT YOU HAVE BEEN MOVING AROUND A LOT MORE THAN USUAL: 0
6. FEELING BAD ABOUT YOURSELF - OR THAT YOU ARE A FAILURE OR HAVE LET YOURSELF OR YOUR FAMILY DOWN: 0
2. FEELING DOWN, DEPRESSED OR HOPELESS: 0
SUM OF ALL RESPONSES TO PHQ QUESTIONS 1-9: 0
SUM OF ALL RESPONSES TO PHQ QUESTIONS 1-9: 0
4. FEELING TIRED OR HAVING LITTLE ENERGY: 0
SUM OF ALL RESPONSES TO PHQ QUESTIONS 1-9: 0
10. IF YOU CHECKED OFF ANY PROBLEMS, HOW DIFFICULT HAVE THESE PROBLEMS MADE IT FOR YOU TO DO YOUR WORK, TAKE CARE OF THINGS AT HOME, OR GET ALONG WITH OTHER PEOPLE: 0

## 2023-07-14 NOTE — PROGRESS NOTES
[address] [address]  Dept: [phone]  Dept Fax: [phone]    Brittany Swann is a 68 y.o. male who presentstoday for his medical conditions/complaints as noted below. Brittany Swann is c/o of  Chief Complaint   Patient presents with    Diabetes    Discuss Medications     Would like a medication for his bowel movements. States that he is not going enough, and when he does he is having pain. He would also like an inhaler for his sinuses. HPI:     Here today for follow up  Over the past month he has been difficulty with constipation  Has tried MOM and OTC herbal supplement with some relief. He states it will soften the stool but will only go 1 time and will have difficulty passing the stool because it is hard and large. He also tried colace with no improvement, MiraLAX did not help  He is having BM about 2-3 times per week but requires intervention with the milk of magnesia  Previously his typical pattern was always daily after his morning coffee  He has not tried daily fiber or probiotics    Has otherwise been well  States his insomnia seems to be less problematic and most nights he gets adequate rest  Has not had any significant drops in blood pressure, no dizziness episodes, no falls  Admits he is past due for follow-up with his cardiologist      Diabetes  He presents for his follow-up diabetic visit. He has type 2 diabetes mellitus. His disease course has been stable. There are no hypoglycemic associated symptoms. Pertinent negatives for hypoglycemia include no confusion, dizziness, headaches or nervousness/anxiousness. Pertinent negatives for diabetes include no chest pain, no fatigue, no polydipsia, no polyphagia, no polyuria and no weakness. There are no diabetic complications. Current diabetic treatment includes diet. He is compliant with treatment most of the time. His weight is stable.  He is

## 2023-08-21 DIAGNOSIS — E11.9 NEW ONSET TYPE 2 DIABETES MELLITUS (HCC): ICD-10-CM

## 2023-08-21 RX ORDER — BLOOD-GLUCOSE METER
KIT MISCELLANEOUS
Qty: 100 EACH | Refills: 5 | Status: SHIPPED | OUTPATIENT
Start: 2023-08-21

## 2023-11-07 DIAGNOSIS — E03.9 ACQUIRED HYPOTHYROIDISM: ICD-10-CM

## 2023-11-07 DIAGNOSIS — K21.9 GASTROESOPHAGEAL REFLUX DISEASE: ICD-10-CM

## 2023-11-08 RX ORDER — FAMOTIDINE 40 MG/1
TABLET, FILM COATED ORAL
Qty: 90 TABLET | Refills: 3 | Status: SHIPPED | OUTPATIENT
Start: 2023-11-08

## 2023-11-08 RX ORDER — LEVOTHYROXINE SODIUM 88 UG/1
TABLET ORAL
Qty: 90 TABLET | Refills: 3 | Status: SHIPPED | OUTPATIENT
Start: 2023-11-08

## 2023-11-27 DIAGNOSIS — M15.9 PRIMARY OSTEOARTHRITIS INVOLVING MULTIPLE JOINTS: ICD-10-CM

## 2023-11-27 DIAGNOSIS — E78.1 HYPERTRIGLYCERIDEMIA: ICD-10-CM

## 2023-11-28 RX ORDER — MELOXICAM 15 MG/1
TABLET ORAL
Qty: 90 TABLET | Refills: 3 | Status: SHIPPED | OUTPATIENT
Start: 2023-11-28

## 2023-11-28 RX ORDER — EZETIMIBE 10 MG/1
TABLET ORAL
Qty: 90 TABLET | Refills: 3 | Status: SHIPPED | OUTPATIENT
Start: 2023-11-28

## 2023-12-11 DIAGNOSIS — F51.01 PRIMARY INSOMNIA: ICD-10-CM

## 2023-12-11 DIAGNOSIS — F41.9 ANXIETY: ICD-10-CM

## 2023-12-11 RX ORDER — BUSPIRONE HYDROCHLORIDE 10 MG/1
TABLET ORAL
Qty: 270 TABLET | Refills: 3 | Status: SHIPPED | OUTPATIENT
Start: 2023-12-11

## 2024-01-01 ENCOUNTER — HOSPITAL ENCOUNTER (OUTPATIENT)
Dept: NUCLEAR MEDICINE | Age: 74
Discharge: HOME | End: 2024-08-30
Attending: INTERNAL MEDICINE
Payer: MEDICARE

## 2024-01-01 DIAGNOSIS — C83.30 DIFFUSE LARGE CELL NON-HODGKIN'S LYMPHOMA (HCC): ICD-10-CM

## 2024-01-01 LAB — GLUCOSE BLD-MCNC: 100 MG/DL (ref 75–110)

## 2024-01-01 PROCEDURE — A9552 F18 FDG: HCPCS | Performed by: INTERNAL MEDICINE

## 2024-01-01 PROCEDURE — A9609 HC RX DIAGNOSTIC RADIOPHARMACEUTICAL: HCPCS | Performed by: INTERNAL MEDICINE

## 2024-01-01 PROCEDURE — 82947 ASSAY GLUCOSE BLOOD QUANT: CPT

## 2024-01-01 PROCEDURE — 2580000003 HC RX 258: Performed by: INTERNAL MEDICINE

## 2024-01-01 PROCEDURE — 3430000000 HC RX DIAGNOSTIC RADIOPHARMACEUTICAL: Performed by: INTERNAL MEDICINE

## 2024-01-01 PROCEDURE — 78815 PET IMAGE W/CT SKULL-THIGH: CPT

## 2024-01-01 RX ORDER — SODIUM CHLORIDE 0.9 % (FLUSH) 0.9 %
10 SYRINGE (ML) INJECTION ONCE
Status: COMPLETED | OUTPATIENT
Start: 2024-01-01 | End: 2024-01-01

## 2024-01-01 RX ORDER — FLUDEOXYGLUCOSE F 18 200 MCI/ML
10 INJECTION, SOLUTION INTRAVENOUS
Status: COMPLETED | OUTPATIENT
Start: 2024-01-01 | End: 2024-01-01

## 2024-01-01 RX ADMIN — FLUDEOXYGLUCOSE F 18 10 MILLICURIE: 200 INJECTION, SOLUTION INTRAVENOUS at 13:07

## 2024-01-01 RX ADMIN — SODIUM CHLORIDE, PRESERVATIVE FREE 10 ML: 5 INJECTION INTRAVENOUS at 13:07

## 2024-01-25 ENCOUNTER — OFFICE VISIT (OUTPATIENT)
Dept: PRIMARY CARE CLINIC | Age: 74
End: 2024-01-25
Payer: MEDICARE

## 2024-01-25 VITALS
SYSTOLIC BLOOD PRESSURE: 102 MMHG | WEIGHT: 191 LBS | HEART RATE: 80 BPM | HEIGHT: 71 IN | OXYGEN SATURATION: 94 % | DIASTOLIC BLOOD PRESSURE: 68 MMHG | BODY MASS INDEX: 26.74 KG/M2

## 2024-01-25 DIAGNOSIS — I95.1 ORTHOSTATIC HYPOTENSION: ICD-10-CM

## 2024-01-25 DIAGNOSIS — G89.29 CHRONIC MIDLINE LOW BACK PAIN WITHOUT SCIATICA: ICD-10-CM

## 2024-01-25 DIAGNOSIS — M15.9 PRIMARY OSTEOARTHRITIS INVOLVING MULTIPLE JOINTS: ICD-10-CM

## 2024-01-25 DIAGNOSIS — H65.113 ACUTE MUCOID OTITIS MEDIA OF BOTH EARS: ICD-10-CM

## 2024-01-25 DIAGNOSIS — Z00.00 MEDICARE ANNUAL WELLNESS VISIT, SUBSEQUENT: Primary | ICD-10-CM

## 2024-01-25 DIAGNOSIS — E78.1 HYPERTRIGLYCERIDEMIA: ICD-10-CM

## 2024-01-25 DIAGNOSIS — F41.9 ANXIETY: ICD-10-CM

## 2024-01-25 DIAGNOSIS — Z95.5 S/P DRUG ELUTING CORONARY STENT PLACEMENT: ICD-10-CM

## 2024-01-25 DIAGNOSIS — F51.01 PRIMARY INSOMNIA: ICD-10-CM

## 2024-01-25 DIAGNOSIS — M54.50 CHRONIC MIDLINE LOW BACK PAIN WITHOUT SCIATICA: ICD-10-CM

## 2024-01-25 DIAGNOSIS — E11.9 TYPE 2 DIABETES MELLITUS WITHOUT COMPLICATION, WITHOUT LONG-TERM CURRENT USE OF INSULIN (HCC): ICD-10-CM

## 2024-01-25 DIAGNOSIS — I25.10 CORONARY ARTERY DISEASE INVOLVING NATIVE CORONARY ARTERY OF NATIVE HEART WITHOUT ANGINA PECTORIS: ICD-10-CM

## 2024-01-25 DIAGNOSIS — E03.9 ACQUIRED HYPOTHYROIDISM: ICD-10-CM

## 2024-01-25 PROBLEM — I10 BENIGN HYPERTENSION: Status: RESOLVED | Noted: 2021-03-08 | Resolved: 2024-01-25

## 2024-01-25 LAB — HBA1C MFR BLD: 6 %

## 2024-01-25 PROCEDURE — 83036 HEMOGLOBIN GLYCOSYLATED A1C: CPT | Performed by: NURSE PRACTITIONER

## 2024-01-25 PROCEDURE — 3017F COLORECTAL CA SCREEN DOC REV: CPT | Performed by: NURSE PRACTITIONER

## 2024-01-25 PROCEDURE — 1123F ACP DISCUSS/DSCN MKR DOCD: CPT | Performed by: NURSE PRACTITIONER

## 2024-01-25 PROCEDURE — 3044F HG A1C LEVEL LT 7.0%: CPT | Performed by: NURSE PRACTITIONER

## 2024-01-25 PROCEDURE — G0439 PPPS, SUBSEQ VISIT: HCPCS | Performed by: NURSE PRACTITIONER

## 2024-01-25 PROCEDURE — G8484 FLU IMMUNIZE NO ADMIN: HCPCS | Performed by: NURSE PRACTITIONER

## 2024-01-25 RX ORDER — PREDNISONE 20 MG/1
20 TABLET ORAL 2 TIMES DAILY
Qty: 10 TABLET | Refills: 0 | Status: SHIPPED | OUTPATIENT
Start: 2024-01-25 | End: 2024-01-30

## 2024-01-25 RX ORDER — HYDROCODONE BITARTRATE AND ACETAMINOPHEN 5; 325 MG/1; MG/1
1 TABLET ORAL 2 TIMES DAILY PRN
COMMUNITY
Start: 2014-09-16 | End: 2024-01-25 | Stop reason: ALTCHOICE

## 2024-01-25 RX ORDER — DOXYCYCLINE HYCLATE 100 MG
100 TABLET ORAL 2 TIMES DAILY
Qty: 20 TABLET | Refills: 0 | Status: SHIPPED | OUTPATIENT
Start: 2024-01-25 | End: 2024-02-04

## 2024-01-25 ASSESSMENT — PATIENT HEALTH QUESTIONNAIRE - PHQ9
9. THOUGHTS THAT YOU WOULD BE BETTER OFF DEAD, OR OF HURTING YOURSELF: 0
SUM OF ALL RESPONSES TO PHQ QUESTIONS 1-9: 2
SUM OF ALL RESPONSES TO PHQ QUESTIONS 1-9: 2
3. TROUBLE FALLING OR STAYING ASLEEP: 0
8. MOVING OR SPEAKING SO SLOWLY THAT OTHER PEOPLE COULD HAVE NOTICED. OR THE OPPOSITE, BEING SO FIGETY OR RESTLESS THAT YOU HAVE BEEN MOVING AROUND A LOT MORE THAN USUAL: 0
SUM OF ALL RESPONSES TO PHQ QUESTIONS 1-9: 2
7. TROUBLE CONCENTRATING ON THINGS, SUCH AS READING THE NEWSPAPER OR WATCHING TELEVISION: 0
5. POOR APPETITE OR OVEREATING: 1
SUM OF ALL RESPONSES TO PHQ QUESTIONS 1-9: 2
4. FEELING TIRED OR HAVING LITTLE ENERGY: 1
2. FEELING DOWN, DEPRESSED OR HOPELESS: 0
SUM OF ALL RESPONSES TO PHQ9 QUESTIONS 1 & 2: 0
1. LITTLE INTEREST OR PLEASURE IN DOING THINGS: 0
10. IF YOU CHECKED OFF ANY PROBLEMS, HOW DIFFICULT HAVE THESE PROBLEMS MADE IT FOR YOU TO DO YOUR WORK, TAKE CARE OF THINGS AT HOME, OR GET ALONG WITH OTHER PEOPLE: 0
6. FEELING BAD ABOUT YOURSELF - OR THAT YOU ARE A FAILURE OR HAVE LET YOURSELF OR YOUR FAMILY DOWN: 0

## 2024-01-25 ASSESSMENT — LIFESTYLE VARIABLES
HOW OFTEN DO YOU HAVE A DRINK CONTAINING ALCOHOL: NEVER
HOW MANY STANDARD DRINKS CONTAINING ALCOHOL DO YOU HAVE ON A TYPICAL DAY: PATIENT DOES NOT DRINK

## 2024-01-25 NOTE — PROGRESS NOTES
Medicare Annual Wellness Visit    Royal Redd is here for Medicare AWV, Diabetes, and Otalgia (Left ear pain present for 3-4 days. Is now causing his throat to hurt as well along with loss of appetite. )    Assessment & Plan   Medicare annual wellness visit, subsequent  Type 2 diabetes mellitus without complication, without long-term current use of insulin ([facility])-A1c remains well-controlled with dietary management  -     POCT glycosylated hemoglobin (Hb A1C)  Acute mucoid otitis media of both ears-will treat with antibiotic and prednisone, advised to return to office if does not improve  -     doxycycline hyclate (VIBRA-TABS) 100 MG tablet; Take 1 tablet by mouth 2 times daily for 10 days, Disp-20 tablet, R-0Normal  -     predniSONE (DELTASONE) 20 MG tablet; Take 1 tablet by mouth 2 times daily for 5 days, Disp-10 tablet, R-0Normal  Anxiety-has been stable with buspirone and Celexa  Hypertriglyceridemia-compliant with statin  Acquired hypothyroidism-most recent TSH therapeutic, continue current dose levothyroxine  CAD, S/P drug eluting coronary stent placement-has been stable/asymptomatic, he is past due for follow-up with cardiology  Orthostatic hypotension-has continued midodrine to maintain adequate blood pressures  Primary insomnia-stable with nightly use of trazodone  Chronic midline low back pain without sciatica-he continues meloxicam daily  Primary osteoarthritis involving multiple joints    Recommendations for Preventive Services Due: see orders and patient instructions/AVS.  Recommended screening schedule for the next 5-10 years is provided to the patient in written form: see Patient Instructions/AVS.     Return in about 6 months (around 7/25/2024) for hypertension check, diabetes check.     Subjective   The following acute and/or chronic problems were also addressed today:  Complains of left ear pain and sore throat over the pasts 3 days  Denies fever, no cough  He has chronic sinus congestion but

## 2024-01-25 NOTE — PATIENT INSTRUCTIONS
Learning About Being Active as an Older Adult  Why is being active important as you get older?     Being active is one of the best things you can do for your health. And it's never too late to start. Being active--or getting active, if you aren't already--has definite benefits. It can:  Give you more energy,  Keep your mind sharp.  Improve balance to reduce your risk of falls.  Help you manage chronic illness with fewer medicines.  No matter how old you are, how fit you are, or what health problems you have, there is a form of activity that will work for you. And the more physical activity you can do, the better your overall health will be.  What kinds of activity can help you stay healthy?  Being more active will make your daily activities easier. Physical activity includes planned exercise and things you do in daily life. There are four types of activity:  Aerobic.  Doing aerobic activity makes your heart and lungs strong.  Includes walking, dancing, and gardening.  Aim for at least 2½ hours spread throughout the week.  It improves your energy and can help you sleep better.  Muscle-strengthening.  This type of activity can help maintain muscle and strengthen bones.  Includes climbing stairs, using resistance bands, and lifting or carrying heavy loads.  Aim for at least twice a week.  It can help protect the knees and other joints.  Stretching.  Stretching gives you better range of motion in joints and muscles.  Includes upper arm stretches, calf stretches, and gentle yoga.  Aim for at least twice a week, preferably after your muscles are warmed up from other activities.  It can help you function better in daily life.  Balancing.  This helps you stay coordinated and have good posture.  Includes heel-to-toe walking, claire chi, and certain types of yoga.  Aim for at least 3 days a week.  It can reduce your risk of falling.  Even if you have a hard time meeting the recommendations, it's better to be more active

## 2024-01-30 RX ORDER — BLOOD-GLUCOSE METER
1 KIT MISCELLANEOUS DAILY
Qty: 1 KIT | Refills: 0 | Status: SHIPPED | OUTPATIENT
Start: 2024-01-30

## 2024-01-30 NOTE — TELEPHONE ENCOUNTER
Last Visit Date: 1/25/2024   Next Visit Date: 7/25/2024     Patient states his glucometer no longer works, replaced batteries and is stuck on number of \"150\"

## 2024-03-18 DIAGNOSIS — F51.01 PRIMARY INSOMNIA: ICD-10-CM

## 2024-03-18 DIAGNOSIS — F41.9 ANXIETY: ICD-10-CM

## 2024-03-18 RX ORDER — MIDODRINE HYDROCHLORIDE 10 MG/1
10 TABLET ORAL 3 TIMES DAILY PRN
Qty: 270 TABLET | Refills: 3 | Status: SHIPPED | OUTPATIENT
Start: 2024-03-18 | End: 2024-03-18 | Stop reason: SDUPTHER

## 2024-03-18 RX ORDER — BUSPIRONE HYDROCHLORIDE 10 MG/1
TABLET ORAL
Qty: 270 TABLET | Refills: 3 | Status: SHIPPED | OUTPATIENT
Start: 2024-03-18

## 2024-03-19 RX ORDER — MIDODRINE HYDROCHLORIDE 10 MG/1
10 TABLET ORAL 3 TIMES DAILY PRN
Qty: 270 TABLET | Refills: 3 | Status: SHIPPED | OUTPATIENT
Start: 2024-03-19

## 2024-03-28 ENCOUNTER — TELEPHONE (OUTPATIENT)
Dept: PRIMARY CARE CLINIC | Age: 74
End: 2024-03-28

## 2024-03-28 NOTE — TELEPHONE ENCOUNTER
Pt called into office to get the name and phone number of the cardiology he saw 2 years ago. Writer provided pt with the information as requested.

## 2024-05-06 ENCOUNTER — HOSPITAL ENCOUNTER (OUTPATIENT)
Age: 74
Setting detail: SPECIMEN
Discharge: HOME | End: 2024-05-06

## 2024-05-06 DIAGNOSIS — R94.4 DECREASED GFR: ICD-10-CM

## 2024-05-06 LAB
ANION GAP SERPL CALCULATED.3IONS-SCNC: 11 MMOL/L (ref 9–16)
BUN SERPL-MCNC: 13 MG/DL (ref 8–23)
CALCIUM SERPL-MCNC: 9 MG/DL (ref 8.6–10.4)
CHLORIDE SERPL-SCNC: 100 MMOL/L (ref 98–107)
CO2 SERPL-SCNC: 26 MMOL/L (ref 20–31)
CREAT SERPL-MCNC: 1.2 MG/DL (ref 0.7–1.2)
GFR, ESTIMATED: 63 ML/MIN/1.73M2
GLUCOSE SERPL-MCNC: 130 MG/DL (ref 74–99)
POTASSIUM SERPL-SCNC: 4.3 MMOL/L (ref 3.7–5.3)
SODIUM SERPL-SCNC: 137 MMOL/L (ref 136–145)

## 2024-05-10 ENCOUNTER — HOSPITAL ENCOUNTER (OUTPATIENT)
Age: 74
Setting detail: SPECIMEN
Discharge: HOME | End: 2024-05-10

## 2024-05-10 ENCOUNTER — OFFICE VISIT (OUTPATIENT)
Dept: PRIMARY CARE CLINIC | Age: 74
End: 2024-05-10
Payer: MEDICARE

## 2024-05-10 VITALS
DIASTOLIC BLOOD PRESSURE: 68 MMHG | SYSTOLIC BLOOD PRESSURE: 104 MMHG | HEART RATE: 68 BPM | BODY MASS INDEX: 25.68 KG/M2 | WEIGHT: 189.6 LBS | HEIGHT: 72 IN | OXYGEN SATURATION: 99 %

## 2024-05-10 DIAGNOSIS — R22.1 NECK MASS: Primary | ICD-10-CM

## 2024-05-10 DIAGNOSIS — R22.1 NECK MASS: ICD-10-CM

## 2024-05-10 LAB
BASOPHILS # BLD: 0.06 K/UL (ref 0–0.2)
BASOPHILS NFR BLD: 1 % (ref 0–2)
EOSINOPHIL # BLD: 0.1 K/UL (ref 0–0.44)
EOSINOPHILS RELATIVE PERCENT: 2 % (ref 1–4)
ERYTHROCYTE [DISTWIDTH] IN BLOOD BY AUTOMATED COUNT: 12.7 % (ref 11.8–14.4)
HCT VFR BLD AUTO: 44.3 % (ref 40.7–50.3)
HGB BLD-MCNC: 14.3 G/DL (ref 13–17)
IMM GRANULOCYTES # BLD AUTO: <0.03 K/UL (ref 0–0.3)
IMM GRANULOCYTES NFR BLD: 0 %
LYMPHOCYTES NFR BLD: 1.62 K/UL (ref 1.1–3.7)
LYMPHOCYTES RELATIVE PERCENT: 26 % (ref 24–43)
MCH RBC QN AUTO: 30.4 PG (ref 25.2–33.5)
MCHC RBC AUTO-ENTMCNC: 32.3 G/DL (ref 28.4–34.8)
MCV RBC AUTO: 94.1 FL (ref 82.6–102.9)
MONOCYTES NFR BLD: 0.57 K/UL (ref 0.1–1.2)
MONOCYTES NFR BLD: 9 % (ref 3–12)
NEUTROPHILS NFR BLD: 62 % (ref 36–65)
NEUTS SEG NFR BLD: 4 K/UL (ref 1.5–8.1)
NRBC BLD-RTO: 0 PER 100 WBC
PLATELET # BLD AUTO: 180 K/UL (ref 138–453)
PMV BLD AUTO: 11.2 FL (ref 8.1–13.5)
RBC # BLD AUTO: 4.71 M/UL (ref 4.21–5.77)
WBC OTHER # BLD: 6.4 K/UL (ref 3.5–11.3)

## 2024-05-10 PROCEDURE — 99214 OFFICE O/P EST MOD 30 MIN: CPT | Performed by: NURSE PRACTITIONER

## 2024-05-10 PROCEDURE — 1036F TOBACCO NON-USER: CPT | Performed by: NURSE PRACTITIONER

## 2024-05-10 PROCEDURE — 1123F ACP DISCUSS/DSCN MKR DOCD: CPT | Performed by: NURSE PRACTITIONER

## 2024-05-10 PROCEDURE — 3017F COLORECTAL CA SCREEN DOC REV: CPT | Performed by: NURSE PRACTITIONER

## 2024-05-10 PROCEDURE — G8427 DOCREV CUR MEDS BY ELIG CLIN: HCPCS | Performed by: NURSE PRACTITIONER

## 2024-05-10 PROCEDURE — G8417 CALC BMI ABV UP PARAM F/U: HCPCS | Performed by: NURSE PRACTITIONER

## 2024-05-10 SDOH — ECONOMIC STABILITY: FOOD INSECURITY: WITHIN THE PAST 12 MONTHS, YOU WORRIED THAT YOUR FOOD WOULD RUN OUT BEFORE YOU GOT MONEY TO BUY MORE.: NEVER TRUE

## 2024-05-10 SDOH — ECONOMIC STABILITY: INCOME INSECURITY: HOW HARD IS IT FOR YOU TO PAY FOR THE VERY BASICS LIKE FOOD, HOUSING, MEDICAL CARE, AND HEATING?: NOT HARD AT ALL

## 2024-05-10 SDOH — ECONOMIC STABILITY: FOOD INSECURITY: WITHIN THE PAST 12 MONTHS, THE FOOD YOU BOUGHT JUST DIDN'T LAST AND YOU DIDN'T HAVE MONEY TO GET MORE.: NEVER TRUE

## 2024-05-10 ASSESSMENT — ENCOUNTER SYMPTOMS
CONSTIPATION: 0
ABDOMINAL PAIN: 0
WHEEZING: 0
NAUSEA: 0
TROUBLE SWALLOWING: 1
VOMITING: 0
BLOOD IN STOOL: 0
SHORTNESS OF BREATH: 0
COUGH: 0
DIARRHEA: 0
SORE THROAT: 0
SINUS PRESSURE: 0

## 2024-05-10 ASSESSMENT — PATIENT HEALTH QUESTIONNAIRE - PHQ9
SUM OF ALL RESPONSES TO PHQ QUESTIONS 1-9: 0
SUM OF ALL RESPONSES TO PHQ QUESTIONS 1-9: 0
9. THOUGHTS THAT YOU WOULD BE BETTER OFF DEAD, OR OF HURTING YOURSELF: NOT AT ALL
1. LITTLE INTEREST OR PLEASURE IN DOING THINGS: NOT AT ALL
8. MOVING OR SPEAKING SO SLOWLY THAT OTHER PEOPLE COULD HAVE NOTICED. OR THE OPPOSITE, BEING SO FIGETY OR RESTLESS THAT YOU HAVE BEEN MOVING AROUND A LOT MORE THAN USUAL: NOT AT ALL
SUM OF ALL RESPONSES TO PHQ9 QUESTIONS 1 & 2: 0
6. FEELING BAD ABOUT YOURSELF - OR THAT YOU ARE A FAILURE OR HAVE LET YOURSELF OR YOUR FAMILY DOWN: NOT AT ALL
2. FEELING DOWN, DEPRESSED OR HOPELESS: NOT AT ALL
4. FEELING TIRED OR HAVING LITTLE ENERGY: NOT AT ALL
7. TROUBLE CONCENTRATING ON THINGS, SUCH AS READING THE NEWSPAPER OR WATCHING TELEVISION: NOT AT ALL
SUM OF ALL RESPONSES TO PHQ QUESTIONS 1-9: 0
10. IF YOU CHECKED OFF ANY PROBLEMS, HOW DIFFICULT HAVE THESE PROBLEMS MADE IT FOR YOU TO DO YOUR WORK, TAKE CARE OF THINGS AT HOME, OR GET ALONG WITH OTHER PEOPLE: NOT DIFFICULT AT ALL
5. POOR APPETITE OR OVEREATING: NOT AT ALL
SUM OF ALL RESPONSES TO PHQ QUESTIONS 1-9: 0
3. TROUBLE FALLING OR STAYING ASLEEP: NOT AT ALL

## 2024-05-10 NOTE — PROGRESS NOTES
[facility]  [facility]  [address] [place] [address] DR  [address]  [facility] [address]  Dept: [phone]  Dept Fax: [phone]    Royal Redd is a 73 y.o. male who presentstoday for his medical conditions/complaints as noted below.  Royal Redd is c/o of  Chief Complaint   Patient presents with    lump     Left side of throat, started around 2 months ago. States that he is having pain and can hardly swallow. States that it started out smaller. Also mentions that he feels like it is moving around.       HPI:     Here today concerned for mass on left neck  States first noticed about 2 months ago, he feels as though is gets larger then decreases  The area is not painful  He has also noticed some trouble swallowing, needs to drink a lot to get food to go down  He otherwise feels well, states his energy level has been normal  Denies any chills or sweats    Reports saw his cardiologist earlier this week  Will be returning next month for stress testing  Denies any chest pain, he has continued midodrine which keeps his blood pressure maintained and prevents dizzy episodes          Hemoglobin A1C (%)   Date Value   01/25/2024 6.0   07/14/2023 5.8   12/13/2022 5.8             ( goal A1C is < 7)   No components found for: \"LABMICR\"  No components found for: \"LDLCHOLESTEROL\", \"LDLCALC\"    (goal LDL is <100)   AST (U/L)   Date Value   07/14/2023 19     ALT (U/L)   Date Value   07/14/2023 23     BUN (mg/dL)   Date Value   05/06/2024 13     BP Readings from Last 3 Encounters:   05/10/24 104/68   05/09/24 104/62   01/25/24 102/68          ([id number])    Past Medical History:   Diagnosis Date    Anxiety     Depression     Diabetes mellitus (HCC)     Ear infection     history of    Headache(784.0)     migraines    Hyperlipidemia     Insomnia     Osteoarthritis     PTSD (post-traumatic stress disorder)     Unspecified sleep apnea       Past Surgical History:   Procedure Laterality Date

## 2024-05-14 ENCOUNTER — HOSPITAL ENCOUNTER (OUTPATIENT)
Dept: ULTRASOUND IMAGING | Age: 74
Discharge: HOME | End: 2024-05-16
Payer: MEDICARE

## 2024-05-14 DIAGNOSIS — R22.1 NECK MASS: ICD-10-CM

## 2024-05-14 PROCEDURE — 76536 US EXAM OF HEAD AND NECK: CPT

## 2024-05-15 ENCOUNTER — TELEPHONE (OUTPATIENT)
Dept: PRIMARY CARE CLINIC | Age: 74
End: 2024-05-15

## 2024-05-15 NOTE — TELEPHONE ENCOUNTER
Patients wife called in stating PCP told him he had to have some testing done but the wife could not find it in his mychart. Writer provided her with the doctors phone number, address. Also saw order for CT scan so writer gave wife the central scheduling's number

## 2024-05-17 ENCOUNTER — TELEPHONE (OUTPATIENT)
Dept: ONCOLOGY | Age: 74
End: 2024-05-17

## 2024-05-17 ENCOUNTER — INITIAL CONSULT (OUTPATIENT)
Dept: ONCOLOGY | Age: 74
End: 2024-05-17
Payer: MEDICARE

## 2024-05-17 VITALS
RESPIRATION RATE: 18 BRPM | WEIGHT: 188.6 LBS | TEMPERATURE: 96.8 F | HEIGHT: 72 IN | DIASTOLIC BLOOD PRESSURE: 60 MMHG | SYSTOLIC BLOOD PRESSURE: 89 MMHG | HEART RATE: 88 BPM | BODY MASS INDEX: 25.55 KG/M2 | OXYGEN SATURATION: 96 %

## 2024-05-17 DIAGNOSIS — R59.0 CERVICAL LYMPHADENOPATHY: Primary | ICD-10-CM

## 2024-05-17 PROCEDURE — 99202 OFFICE O/P NEW SF 15 MIN: CPT | Performed by: INTERNAL MEDICINE

## 2024-05-17 NOTE — PROGRESS NOTES
_           Mr. Royal Redd is a very pleasant 73 y.o. male with history of multiple co morbidities as listed.  Patient is referred for evaluation and further management of cervical lymphadenopathy.  The patient states he felt lump in the neck about 1 month ago.  He also had difficulty swallowing for several weeks.  No choking.  He denies any fever or chills or night sweats.  He denies feeling any other lumps or bumps.  No cough sputum or hemoptysis.  No sore throat.  No headaches or dizziness.  The lump in the left neck is getting larger.  Not in pain.  Patient quit smoking in 2000.  Used to smoke 2 to 3 packs/day since teenage.  No alcohol drinking..       PAST MEDICAL HISTORY: has a past medical history of Anxiety, Depression, Diabetes mellitus (HCC), Ear infection, Headache(784.0), Hyperlipidemia, Insomnia, Osteoarthritis, PTSD (post-traumatic stress disorder), and Unspecified sleep apnea.    PAST SURGICAL HISTORY: has a past surgical history that includes Tonsillectomy and adenoidectomy; Foot surgery (Right); and Cardiac catheterization (07/26/2021).     CURRENT MEDICATIONS:  has a current medication list which includes the following prescription(s): midodrine, buspirone, glucose monitoring, clopidogrel, citalopram, simvastatin, oxybutynin, trazodone, meloxicam, ezetimibe, famotidine, levothyroxine, freestyle lite, fluticasone, freestyle lancets, vitamin b-12, aspirin ec, jobst knee high compression sm, glucose monitoring, misc natural products, and blood press monitor/m-l cuff.    ALLERGIES:  is allergic to bee venom and pcn [penicillins].    FAMILY HISTORY: Mother had breast cancer.  Otherwise negative for any hematological or oncological conditions.     SOCIAL HISTORY:  reports that he quit smoking about 26 years ago. His smoking use included cigarettes. He started smoking about 46 years ago. He has a 60.0

## 2024-05-17 NOTE — TELEPHONE ENCOUNTER
DAVID HERE FOR CONSULT   Soft tissue neck CT scan soon  IR for LN biopsy  navigator  RV after above  PT HAS CT OF SOFT TISSUE NECK SCHEDULE FOR 5/20/24 @ 2:30PM   BX NEEDS TO BE REVIEWED   RN LISA GOODSON REACHING OUT TO PT   TRIAGE TO WATCH FOR BX TO SCHEDULE F/U   AVS PRINTED AND GIVEN ON EXIT

## 2024-05-17 NOTE — TELEPHONE ENCOUNTER
Pt @ [facility] for Dr. Villanueva consult.  Dr. Villanueva placed referral for oncology navigation.    Name: Royal Redd  : 1950  MRN: [id number]    Oncology Navigation- Initial Note:    Intake-  Contact Type: Telephone    Continuum of Care: Diagnosis/Active Treatment    Smoking hx: Former     Notes: Met w/pt & pt's spouse, Vy, after consult.  Notified writer will be navigating oncology care & may contact prn.  Discussed potential barriers to care, pt stated early stage dementia & requested Vy primary contact, demographics updated.  Pt stated uses cane for long distances.  Inquired on alcohol/drug use, pt denied any.  Inquired on dental health, pt stated full set of dentures.  Pt & Vy denied questions/concerns.  [facility] written materials along w/writer's business card mailed given to pt.  Will continue to follow.    Electronically signed by Linette William RN on 2024 at 9:48 AM

## 2024-05-20 ENCOUNTER — HOSPITAL ENCOUNTER (OUTPATIENT)
Dept: CT IMAGING | Age: 74
Discharge: HOME | End: 2024-05-22
Payer: MEDICARE

## 2024-05-20 ENCOUNTER — TELEPHONE (OUTPATIENT)
Dept: INTERVENTIONAL RADIOLOGY/VASCULAR | Age: 74
End: 2024-05-20

## 2024-05-20 DIAGNOSIS — R93.89 ABNORMAL ULTRASOUND OF NECK: ICD-10-CM

## 2024-05-20 DIAGNOSIS — R22.1 NECK MASS: ICD-10-CM

## 2024-05-20 PROCEDURE — 2580000003 HC RX 258: Performed by: NURSE PRACTITIONER

## 2024-05-20 PROCEDURE — 6360000004 HC RX CONTRAST MEDICATION: Performed by: NURSE PRACTITIONER

## 2024-05-20 PROCEDURE — 70491 CT SOFT TISSUE NECK W/DYE: CPT

## 2024-05-20 RX ORDER — 0.9 % SODIUM CHLORIDE 0.9 %
80 INTRAVENOUS SOLUTION INTRAVENOUS ONCE
Status: COMPLETED | OUTPATIENT
Start: 2024-05-20 | End: 2024-05-20

## 2024-05-20 RX ORDER — SODIUM CHLORIDE 0.9 % (FLUSH) 0.9 %
10 SYRINGE (ML) INJECTION PRN
Status: DISCONTINUED | OUTPATIENT
Start: 2024-05-20 | End: 2024-05-23 | Stop reason: HOSPADM

## 2024-05-20 RX ADMIN — IOPAMIDOL 75 ML: 755 INJECTION, SOLUTION INTRAVENOUS at 14:55

## 2024-05-20 RX ADMIN — SODIUM CHLORIDE 80 ML: 9 INJECTION, SOLUTION INTRAVENOUS at 14:56

## 2024-05-20 RX ADMIN — SODIUM CHLORIDE, PRESERVATIVE FREE 10 ML: 5 INJECTION INTRAVENOUS at 14:56

## 2024-05-21 ENCOUNTER — TELEPHONE (OUTPATIENT)
Dept: ONCOLOGY | Age: 74
End: 2024-05-21

## 2024-05-21 NOTE — TELEPHONE ENCOUNTER
Name: Royal Redd  : 1950  MRN: [id number]    Oncology Navigation Follow-Up Note    Contact Type:  Telephone    Notes: Spoke w/JUMA Borja IR , to inquire on bx.  Huong stated attempted to contact pt, no answer, VM left to contact.  Attempted to contact Vy, pt's spouse, no answer, VM left encouraged to contact Huong rothman to schedule bx.  Will continue to follow.       Electronically signed by Linette William RN on 2024 at 11:09 AM

## 2024-05-22 ENCOUNTER — TELEPHONE (OUTPATIENT)
Dept: ONCOLOGY | Age: 74
End: 2024-05-22

## 2024-05-22 NOTE — TELEPHONE ENCOUNTER
Name: Royal Redd  : 1950  MRN: [id number]    Oncology Navigation Follow-Up Note    Contact Type:  Medical Oncology    Notes: Upon review of chart noted IR bx scheduled .  Ally, [facility] , updated & requested Dr. Villanueva f/u to discuss final pathology.  Pt scheduled  @ 9219.  Attempted to contact Vy, pt's spouse, no answer, VM left updated on Dr. Villanueva appt details & encouraged to contact writer prn.  Will continue to follow.      Electronically signed by Linette William RN on 2024 at 10:09 AM

## 2024-05-30 ENCOUNTER — HOSPITAL ENCOUNTER (OUTPATIENT)
Dept: ULTRASOUND IMAGING | Age: 74
Discharge: HOME | End: 2024-06-01
Payer: MEDICARE

## 2024-05-30 DIAGNOSIS — R59.0 CERVICAL LYMPHADENOPATHY: ICD-10-CM

## 2024-05-30 PROCEDURE — 76942 ECHO GUIDE FOR BIOPSY: CPT

## 2024-05-30 NOTE — FLOWSHEET NOTE
Cervical lymph node biopsy    MS-RN  -SHAVON DONNELLY-JOHN ACUNA    11:11   AM PATHOLOGY ARRIVES,       CORE CERVICAL LYMPH NODE SAMPLES OBTAINED AND GIVEN TO PATHOLOGY.    PT TOLERATED WELL-NO BLEEDING NOTED   WRITER WALKED PT TO WAITING ROOM

## 2024-05-30 NOTE — BRIEF OP NOTE
Brief Postoperative Note    Royal Redd  YOB: 1950  [id number]    Pre-operative Diagnosis: Cervical Lymphadenopathy    Post-operative Diagnosis: Same    Procedure: LN bx    Anesthesia: Local    Surgeons/Assistants: MD Rubén and Dre Lee PA-C    Estimated Blood Loss: less than 50     Complications: None    Specimens: Was Obtained:     Findings: Technically successful left cervical LN bx. 6 cores and 1 FNA sample was obtained.     Electronically signed by SHAVON Mares on 5/30/2024 at 11:38 AM

## 2024-06-03 LAB
FLOW CYTOMETRY SOURCE: NORMAL
FLOW CYTOMETRY, NODE/FLUID: NORMAL
SURGICAL PATHOLOGY REPORT: NORMAL

## 2024-06-07 ENCOUNTER — TELEPHONE (OUTPATIENT)
Dept: ONCOLOGY | Age: 74
End: 2024-06-07

## 2024-06-07 ENCOUNTER — OFFICE VISIT (OUTPATIENT)
Dept: ONCOLOGY | Age: 74
End: 2024-06-07
Payer: MEDICARE

## 2024-06-07 VITALS
HEART RATE: 78 BPM | SYSTOLIC BLOOD PRESSURE: 136 MMHG | OXYGEN SATURATION: 97 % | RESPIRATION RATE: 18 BRPM | WEIGHT: 187.7 LBS | BODY MASS INDEX: 25.46 KG/M2 | TEMPERATURE: 97.4 F | DIASTOLIC BLOOD PRESSURE: 94 MMHG

## 2024-06-07 DIAGNOSIS — R59.0 CERVICAL LYMPHADENOPATHY: Primary | ICD-10-CM

## 2024-06-07 PROCEDURE — 1123F ACP DISCUSS/DSCN MKR DOCD: CPT | Performed by: INTERNAL MEDICINE

## 2024-06-07 PROCEDURE — G8427 DOCREV CUR MEDS BY ELIG CLIN: HCPCS | Performed by: INTERNAL MEDICINE

## 2024-06-07 PROCEDURE — 3017F COLORECTAL CA SCREEN DOC REV: CPT | Performed by: INTERNAL MEDICINE

## 2024-06-07 PROCEDURE — 99211 OFF/OP EST MAY X REQ PHY/QHP: CPT | Performed by: INTERNAL MEDICINE

## 2024-06-07 PROCEDURE — G8417 CALC BMI ABV UP PARAM F/U: HCPCS | Performed by: INTERNAL MEDICINE

## 2024-06-07 PROCEDURE — 1036F TOBACCO NON-USER: CPT | Performed by: INTERNAL MEDICINE

## 2024-06-07 PROCEDURE — 99214 OFFICE O/P EST MOD 30 MIN: CPT | Performed by: INTERNAL MEDICINE

## 2024-06-07 NOTE — TELEPHONE ENCOUNTER
Instructions   from MD Dr Johnny Hunt for LN biopsy   RV will be determined.       Referral faxed to Dr. Johnny GOODSON, Navigator will follow with patient and let staff know when to schedule.

## 2024-06-07 NOTE — TELEPHONE ENCOUNTER
Name: Royal Redd  : 1950  MRN: [id number]    Oncology Navigation Follow-Up Note    Contact Type:  Medical Oncology    Notes: Pt @ [facility] for Dr. Villanueva f/u.  Met w/pt & pt's spouse after f/u.  Dr. Villanueva stated referral for Dr. Johnny miranda.  Instructed pt writer will follow & encouraged to contact writer prn.  Will continue to follow.      Electronically signed by Linette William RN on 2024 at 3:32 PM

## 2024-06-11 ENCOUNTER — TELEPHONE (OUTPATIENT)
Dept: ONCOLOGY | Age: 74
End: 2024-06-11

## 2024-06-11 NOTE — TELEPHONE ENCOUNTER
Name: Royal Toscanonarencatrachito  : 1950  MRN: [id number]    Oncology Navigation Follow-Up Note    Contact Type:  Telephone    Notes: Pt's spouse left VM stating pt scheduled for Dr. Turner consult today.  Will continue to follow.      Electronically signed by Linette William RN on 2024 at 2:37 PM

## 2024-06-12 ENCOUNTER — TELEPHONE (OUTPATIENT)
Dept: ONCOLOGY | Age: 74
End: 2024-06-12

## 2024-06-12 NOTE — TELEPHONE ENCOUNTER
Name: Royal Redd  : 1950  MRN: [id number]    Oncology Navigation Follow-Up Note    Contact Type:  Telephone    Notes: Spoke w/Angeli, Dr. Turner's nurse navigator, to inquire on consultation.  Angeli stated pt referred to Dr. Euceda for FNA, stat CT chest/neck ordered, & pt scheduled for Dr. Turner f/u .  Will continue to follow.      Electronically signed by Linette William RN on 2024 at 2:50 PM

## 2024-06-19 ENCOUNTER — TELEPHONE (OUTPATIENT)
Dept: ONCOLOGY | Age: 74
End: 2024-06-19

## 2024-06-19 NOTE — TELEPHONE ENCOUNTER
Name: Royal Redd  : 1950  MRN: [id number]    Oncology Navigation Follow-Up Note    Contact Type:  Telephone    Notes: Spoke w/Angeli, Dr. Turner's nurse navigator, inquired on FNA results.  Angeli stated completed & will fax to writer.  Pt scheduled for Dr. Turner f/u .  Will continue to follow.      Electronically signed by Linette William RN on 2024 at 2:42 PM

## 2024-06-19 NOTE — TELEPHONE ENCOUNTER
Name: Royal Redd  : 1950  MRN: [id number]    Oncology Navigation Follow-Up Note    Contact Type:  Telephone    Notes: Upon review of [facility] care everywhere noted pt's spouse called  stating pt w/difficulty swallowing & hard time breathing.  Spouse instructed to take pt to ER, no ER encounters noted.  Spoke w/spouse to inquire on pt.  Spouse stated pt doing better, denied difficulty breathing or swallowing.  Spouse confirmed  Dr. Turner f/u.  Spouse verbalized financial concerns r/t medical bills.  Instructed spouse writer will request [facility] , contact.  Instructed spouse writer will contact Dr. Johnny Suh's navigator, & request [facility] financial navigator contact spouse.  Spouse verbalized understanding & denied further questions/concerns.  Zane & Angeli updated.  Will continue to follow.      Electronically signed by Linette William RN on 2024 at 3:12 PM

## 2024-06-20 ENCOUNTER — TELEPHONE (OUTPATIENT)
Dept: ONCOLOGY | Age: 74
End: 2024-06-20

## 2024-06-20 NOTE — PROGRESS NOTES
bleeding tendency. No bruises or ecchymosis. No history of clotting problems.  Infectious disease: No fever, chills or frequent infections.   Endocrine: No polydipsia or polyuria. No temperature intolerance.  Neurologic: No headaches or dizziness. No weakness or numbness of the extremities. No changes in balance, coordination,  memory, mentation, behavior.   Allergic/Immunologic: No nasal congestion or hives. No repeated infections.       PHYSICAL EXAM:  The patient is not in acute distress.  Vital signs: Blood pressure (!) 136/94, pulse 78, temperature 97.4 °F (36.3 °C), temperature source Temporal, resp. rate 18, weight 85.1 kg (187 lb 11.2 oz), SpO2 97 %.     General appearance - well appearing, not in pain or distress  Mental status - good mood, alert and oriented  Eyes - pupils equal and reactive, extraocular eye movements intact  Ears - bilateral TM's and external ear canals normal  Nose - normal and patent, no erythema, discharge or polyps  Mouth - mucous membranes moist, pharynx normal without lesions  Neck - supple, left cervical lymphadenopathy  Lymphatics -left neck multiple 1 inch enlarged lymph nodes, no hepatosplenomegaly  Chest - clear to auscultation, no wheezes, rales or rhonchi, symmetric air entry  Heart - normal rate, regular rhythm, normal S1, S2, no murmurs, rubs, clicks or gallops  Abdomen - soft, nontender, nondistended, no masses or organomegaly  Neurological - alert, oriented, normal speech, no focal findings or movement disorder noted  Musculoskeletal - no joint tenderness, deformity or swelling  Extremities - peripheral pulses normal, no pedal edema, no clubbing or cyanosis  Skin - normal coloration and turgor, no rashes, no suspicious skin lesions noted     Review of Diagnostic data:   Lab Results   Component Value Date    WBC 6.4 05/10/2024    HGB 14.3 05/10/2024    HCT 44.3 05/10/2024    MCV 94.1 05/10/2024     05/10/2024       Chemistry        Component Value Date/Time    NA

## 2024-06-20 NOTE — TELEPHONE ENCOUNTER
received referral from Nurse Navigator stating financial concerns.  called patient's spouse and left a message.

## 2024-06-21 ENCOUNTER — TELEPHONE (OUTPATIENT)
Dept: ONCOLOGY | Age: 74
End: 2024-06-21

## 2024-06-21 NOTE — TELEPHONE ENCOUNTER
Name: Royal Redd  : 1950  MRN: [id number]    Oncology Navigation Follow-Up Note    Contact Type:  Telephone    Notes: Dr. Villanueva updated on FNA results & notified pt scheduled for Dr. Turner f/u this am.  Dr. Villanueva requested triple HIT testing results.  Spoke w/Tiff, Dr. Euceda's office, notified Dr. Villanueva requesting triple HIT results.  Tiff stated results expected today.  Requested faxed to writer asap @ [phone].   Will continue to follow.      Electronically signed by Linette William RN on 2024 at 9:06 AM

## 2024-06-21 NOTE — TELEPHONE ENCOUNTER
Name: Royal Redd  : 1950  MRN: [id number]    Oncology Navigation Follow-Up Note    Contact Type:  Telephone    Notes: Dr. Johnny Suh's nurse navigator, alerted writer stat PET/CT scan ordered & scheduled @ [facility] .  Dr. Villanueva updated.  Dr. Villanueva requested pt scheduled for f/u  @ 0800.  Hermelinda ARANA, [facility], updated & requested appt scheduled.  Spoke w/Vy, pt's spouse, updated on  Dr. Villanueva f/u @ 0800.  Vy verbalized understanding & denied questions/concerns.  Encouraged to contact writer prn.  Will continue to follow.      Electronically signed by Linette William RN on 2024 at 2:46 PM

## 2024-06-24 ENCOUNTER — TELEPHONE (OUTPATIENT)
Dept: ONCOLOGY | Age: 74
End: 2024-06-24

## 2024-06-24 ENCOUNTER — OFFICE VISIT (OUTPATIENT)
Dept: ONCOLOGY | Age: 74
End: 2024-06-24
Payer: MEDICARE

## 2024-06-24 VITALS
RESPIRATION RATE: 18 BRPM | WEIGHT: 182.8 LBS | TEMPERATURE: 97.2 F | BODY MASS INDEX: 24.79 KG/M2 | SYSTOLIC BLOOD PRESSURE: 115 MMHG | DIASTOLIC BLOOD PRESSURE: 70 MMHG | HEART RATE: 81 BPM | OXYGEN SATURATION: 96 %

## 2024-06-24 DIAGNOSIS — R59.0 CERVICAL LYMPHADENOPATHY: ICD-10-CM

## 2024-06-24 DIAGNOSIS — C83.30 DIFFUSE LARGE CELL NON-HODGKIN'S LYMPHOMA (HCC): Primary | ICD-10-CM

## 2024-06-24 PROCEDURE — 1036F TOBACCO NON-USER: CPT | Performed by: INTERNAL MEDICINE

## 2024-06-24 PROCEDURE — 99211 OFF/OP EST MAY X REQ PHY/QHP: CPT | Performed by: INTERNAL MEDICINE

## 2024-06-24 PROCEDURE — G8427 DOCREV CUR MEDS BY ELIG CLIN: HCPCS | Performed by: INTERNAL MEDICINE

## 2024-06-24 PROCEDURE — G8420 CALC BMI NORM PARAMETERS: HCPCS | Performed by: INTERNAL MEDICINE

## 2024-06-24 PROCEDURE — 99215 OFFICE O/P EST HI 40 MIN: CPT | Performed by: INTERNAL MEDICINE

## 2024-06-24 PROCEDURE — 1123F ACP DISCUSS/DSCN MKR DOCD: CPT | Performed by: INTERNAL MEDICINE

## 2024-06-24 PROCEDURE — 3017F COLORECTAL CA SCREEN DOC REV: CPT | Performed by: INTERNAL MEDICINE

## 2024-06-24 RX ORDER — DIPHENHYDRAMINE HYDROCHLORIDE 50 MG/ML
50 INJECTION INTRAMUSCULAR; INTRAVENOUS
OUTPATIENT
Start: 2024-07-01

## 2024-06-24 RX ORDER — SODIUM CHLORIDE 0.9 % (FLUSH) 0.9 %
5-40 SYRINGE (ML) INJECTION PRN
OUTPATIENT
Start: 2024-07-01

## 2024-06-24 RX ORDER — ACETAMINOPHEN 325 MG/1
650 TABLET ORAL ONCE
OUTPATIENT
Start: 2024-07-01 | End: 2024-06-24

## 2024-06-24 RX ORDER — ALBUTEROL SULFATE 90 UG/1
4 AEROSOL, METERED RESPIRATORY (INHALATION) PRN
OUTPATIENT
Start: 2024-07-01

## 2024-06-24 RX ORDER — FAMOTIDINE 10 MG/ML
20 INJECTION, SOLUTION INTRAVENOUS
OUTPATIENT
Start: 2024-07-01

## 2024-06-24 RX ORDER — SODIUM CHLORIDE 9 MG/ML
INJECTION, SOLUTION INTRAVENOUS CONTINUOUS
OUTPATIENT
Start: 2024-07-01

## 2024-06-24 RX ORDER — SODIUM CHLORIDE 9 MG/ML
5-250 INJECTION, SOLUTION INTRAVENOUS PRN
OUTPATIENT
Start: 2024-07-01

## 2024-06-24 RX ORDER — ONDANSETRON 2 MG/ML
8 INJECTION INTRAMUSCULAR; INTRAVENOUS
OUTPATIENT
Start: 2024-07-01

## 2024-06-24 RX ORDER — MEPERIDINE HYDROCHLORIDE 50 MG/ML
12.5 INJECTION INTRAMUSCULAR; INTRAVENOUS; SUBCUTANEOUS PRN
OUTPATIENT
Start: 2024-07-01

## 2024-06-24 RX ORDER — DOXORUBICIN HYDROCHLORIDE 2 MG/ML
50 INJECTION, SOLUTION INTRAVENOUS ONCE
OUTPATIENT
Start: 2024-07-01 | End: 2024-06-24

## 2024-06-24 RX ORDER — DIPHENHYDRAMINE HYDROCHLORIDE 50 MG/ML
50 INJECTION INTRAMUSCULAR; INTRAVENOUS ONCE
OUTPATIENT
Start: 2024-07-01 | End: 2024-06-24

## 2024-06-24 RX ORDER — PREDNISONE 50 MG/1
50 TABLET ORAL 2 TIMES DAILY
Qty: 60 TABLET | Refills: 5 | Status: SHIPPED | OUTPATIENT
Start: 2024-06-24 | End: 2024-06-29

## 2024-06-24 RX ORDER — EPINEPHRINE 1 MG/ML
0.3 INJECTION, SOLUTION, CONCENTRATE INTRAVENOUS PRN
OUTPATIENT
Start: 2024-07-01

## 2024-06-24 RX ORDER — PALONOSETRON 0.05 MG/ML
0.25 INJECTION, SOLUTION INTRAVENOUS ONCE
OUTPATIENT
Start: 2024-07-01 | End: 2024-06-24

## 2024-06-24 RX ORDER — PROCHLORPERAZINE EDISYLATE 5 MG/ML
5 INJECTION INTRAMUSCULAR; INTRAVENOUS
OUTPATIENT
Start: 2024-07-01

## 2024-06-24 RX ORDER — ONDANSETRON 4 MG/1
4 TABLET, ORALLY DISINTEGRATING ORAL 3 TIMES DAILY PRN
Qty: 60 TABLET | Refills: 4 | Status: SHIPPED | OUTPATIENT
Start: 2024-06-24

## 2024-06-24 RX ORDER — ACETAMINOPHEN 325 MG/1
650 TABLET ORAL
OUTPATIENT
Start: 2024-07-01

## 2024-06-24 RX ORDER — HEPARIN SODIUM (PORCINE) LOCK FLUSH IV SOLN 100 UNIT/ML 100 UNIT/ML
500 SOLUTION INTRAVENOUS PRN
OUTPATIENT
Start: 2024-07-01

## 2024-06-24 NOTE — TELEPHONE ENCOUNTER
Instructions   from Dr. Catherine Villanueva MD    IR for mediport ASAP  Start R-CHOP ASAP  RV 3 weeks after starting chemo      Port with IR [facility] 6/28/24 9:30 arrival time  STAT request sent to precert for STAT start  RV to be scheduled 3 weeks after stating tx. Will schedule once tx is scheduled.

## 2024-06-24 NOTE — TELEPHONE ENCOUNTER
checked in with patient and spouse following Medical Oncology appointment. Patient reports financial concerns.  reviewed [facility] and will check in once treatment begins.  encouraged patient and spouse to reach out if needed.

## 2024-06-25 ENCOUNTER — TELEPHONE (OUTPATIENT)
Dept: PRIMARY CARE CLINIC | Age: 74
End: 2024-06-25

## 2024-06-25 NOTE — TELEPHONE ENCOUNTER
Patient called in stating he has cancer and gets a port put in on Friday and they would like a list of all his blood thinners and Asprin he is taking.

## 2024-06-25 NOTE — PROGRESS NOTES
_      Chief Complaint   Patient presents with    Follow-up     DIAGNOSIS:       Diffuse large cell B-cell non-Hodgkin's lymphoma   left cervical lymphadenopathy  Largest thyroid mass and large mediastinal mass.  Dysphagia  Past history of tobacco abuse.  Quit 2000.    CURRENT THERAPY:         Start treatment with R-CHOP  Probable consolidative mediastinal radiation following the chemotherapy  BRIEF CASE HISTORY:      Mr. Royal Redd is a very pleasant 73 y.o. male with history of multiple co morbidities as listed.  Patient is referred for evaluation and further management of cervical lymphadenopathy.  The patient states he felt lump in the neck about 1 month ago.  He also had difficulty swallowing for several weeks.  No choking.  He denies any fever or chills or night sweats.  He denies feeling any other lumps or bumps.  No cough sputum or hemoptysis.  No sore throat.  No headaches or dizziness.  The lump in the left neck is getting larger.  Not in pain.  Patient quit smoking in 2000.  Used to smoke 2 to 3 packs/day since teenage.  No alcohol drinking..   INTERIM HISTORY:   Patient seen for follow-up cervical lymphadenopathy.  He had large thyroid mass and mediastinal mass.  Patient was referred to IR for needle biopsy.  Was done from the thyroid area.  Pathology is not diagnostic.  No sufficient viable cells.  He was referred to ENT.  He had biopsy from left cervical lymph node and from thyroid masses.  All positive for diffuse large cell non-Hodgkin's lymphoma.  FISH test showed  MYC negative.  No double hit.    Clinically mass in the neck and thyroid is getting bigger.  Continues to have congestion.  Continues to have difficulty swallowing.  No respiratory distress.  Evaluated by ENT and no need for trach.    Patient denies fever or night sweats.  No new problems.         PAST MEDICAL HISTORY: has a past medical

## 2024-06-25 NOTE — TELEPHONE ENCOUNTER
He is advised to stop baby aspirin and clopidogrel (plavix) until procedure complete, thanks    Also- his current medicines [facility] readily [facility] so they will have a complete list  Thanks

## 2024-06-26 ENCOUNTER — TELEPHONE (OUTPATIENT)
Dept: ONCOLOGY | Age: 74
End: 2024-06-26

## 2024-06-26 DIAGNOSIS — Z79.899 LONG-TERM USE OF HIGH-RISK MEDICATION: Primary | ICD-10-CM

## 2024-06-26 NOTE — TELEPHONE ENCOUNTER
Name: Royal Redd  : 1950  MRN: [id number]    Oncology Navigation Follow-Up Note    Contact Type:  Telephone    Notes: Vy, pt's spouse, called in to inquire on scheduling chemotherapy.  Vy stated neck swelling & mass increasing daily.  Vy stated pt scheduled for port placement  @ 1100.  Spoke w/Dr. Villanueva to inquire if aware of triple HIT results & PET/CT scan, Dr. Villanueva stated yes.  Noted chemotherapy approved & inquired on scheduling.  Dr. Villanueva requested pt scheduled for C1 on .  Spoke w/Julisa, [facility] infusion nurse, updated on pt & Dr. Villanueva's response.  Julisa stated will contact Vy to schedule.  Vy updated, verbalized understanding, & denied questions/concerns.  Encouraged to contact writer prn.  Will continue to follow.      Electronically signed by Linette William RN on 2024 at 3:37 PM

## 2024-06-27 ENCOUNTER — HOSPITAL ENCOUNTER (OUTPATIENT)
Age: 74
Discharge: HOME | End: 2024-06-27
Payer: MEDICARE

## 2024-06-27 ENCOUNTER — TELEPHONE (OUTPATIENT)
Dept: ONCOLOGY | Age: 74
End: 2024-06-27

## 2024-06-27 ENCOUNTER — TELEPHONE (OUTPATIENT)
Dept: INFUSION THERAPY | Age: 74
End: 2024-06-27

## 2024-06-27 DIAGNOSIS — C83.30 DIFFUSE LARGE CELL NON-HODGKIN'S LYMPHOMA (HCC): Primary | ICD-10-CM

## 2024-06-27 DIAGNOSIS — C83.30 DIFFUSE LARGE CELL NON-HODGKIN'S LYMPHOMA (HCC): ICD-10-CM

## 2024-06-27 LAB
BASOPHILS # BLD: 0.05 K/UL (ref 0–0.2)
BASOPHILS NFR BLD: 1 % (ref 0–2)
EOSINOPHIL # BLD: 0.08 K/UL (ref 0–0.44)
EOSINOPHILS RELATIVE PERCENT: 1 % (ref 1–4)
ERYTHROCYTE [DISTWIDTH] IN BLOOD BY AUTOMATED COUNT: 13.3 % (ref 11.8–14.4)
HCT VFR BLD AUTO: 45.4 % (ref 40.7–50.3)
HGB BLD-MCNC: 14.6 G/DL (ref 13–17)
IMM GRANULOCYTES # BLD AUTO: 0.03 K/UL (ref 0–0.3)
IMM GRANULOCYTES NFR BLD: 0 %
LYMPHOCYTES NFR BLD: 1.27 K/UL (ref 1.1–3.7)
LYMPHOCYTES RELATIVE PERCENT: 18 % (ref 24–43)
MCH RBC QN AUTO: 30.2 PG (ref 25.2–33.5)
MCHC RBC AUTO-ENTMCNC: 32.2 G/DL (ref 28.4–34.8)
MCV RBC AUTO: 93.8 FL (ref 82.6–102.9)
MONOCYTES NFR BLD: 0.71 K/UL (ref 0.1–1.2)
MONOCYTES NFR BLD: 10 % (ref 3–12)
NEUTROPHILS NFR BLD: 70 % (ref 36–65)
NEUTS SEG NFR BLD: 4.91 K/UL (ref 1.5–8.1)
NRBC BLD-RTO: 0 PER 100 WBC
PLATELET # BLD AUTO: 147 K/UL (ref 138–453)
PMV BLD AUTO: 11.4 FL (ref 8.1–13.5)
RBC # BLD AUTO: 4.84 M/UL (ref 4.21–5.77)
WBC OTHER # BLD: 7.1 K/UL (ref 3.5–11.3)

## 2024-06-27 PROCEDURE — 36415 COLL VENOUS BLD VENIPUNCTURE: CPT

## 2024-06-27 PROCEDURE — 80053 COMPREHEN METABOLIC PANEL: CPT

## 2024-06-27 PROCEDURE — 86704 HEP B CORE ANTIBODY TOTAL: CPT

## 2024-06-27 PROCEDURE — 86317 IMMUNOASSAY INFECTIOUS AGENT: CPT

## 2024-06-27 PROCEDURE — 85025 COMPLETE CBC W/AUTO DIFF WBC: CPT

## 2024-06-27 RX ORDER — ALLOPURINOL 300 MG/1
300 TABLET ORAL DAILY
Qty: 14 TABLET | Refills: 0 | Status: SHIPPED | OUTPATIENT
Start: 2024-06-27 | End: 2024-07-11

## 2024-06-27 NOTE — TELEPHONE ENCOUNTER
I was notified this morning that patient is in need of Echo appointment prior to his treatment on 7/1/24. Echo scheduled for 6/28/24 at 10:15 am at [facility] with arrival time of 9:45. Triage and patient's navigator notified of appointment. Triage nurse will call patient to update.

## 2024-06-27 NOTE — TELEPHONE ENCOUNTER
Writer contacted pt and his wife regarding chemotherapy start on 7/1/24. Writer instructed pt to have labs drawn day of port placement on 6/28/24. Pt educated on prednisone and allopurinol and how to take medications. Pt scheduled for echo on 7/3/27, nuclear lexiscan from 6/6/24 has a current EF of 52%. Pt and wife verbalized understanding.

## 2024-06-28 ENCOUNTER — TELEPHONE (OUTPATIENT)
Dept: INFUSION THERAPY | Age: 74
End: 2024-06-28

## 2024-06-28 ENCOUNTER — TELEPHONE (OUTPATIENT)
Dept: ONCOLOGY | Age: 74
End: 2024-06-28

## 2024-06-28 ENCOUNTER — HOSPITAL ENCOUNTER (OUTPATIENT)
Dept: INTERVENTIONAL RADIOLOGY/VASCULAR | Age: 74
End: 2024-06-28
Payer: MEDICARE

## 2024-06-28 VITALS
TEMPERATURE: 96.8 F | SYSTOLIC BLOOD PRESSURE: 131 MMHG | BODY MASS INDEX: 24.65 KG/M2 | DIASTOLIC BLOOD PRESSURE: 84 MMHG | HEIGHT: 72 IN | WEIGHT: 182 LBS | OXYGEN SATURATION: 94 % | HEART RATE: 83 BPM | RESPIRATION RATE: 16 BRPM

## 2024-06-28 DIAGNOSIS — C83.30 DIFFUSE LARGE CELL NON-HODGKIN'S LYMPHOMA (HCC): ICD-10-CM

## 2024-06-28 LAB
ALBUMIN SERPL-MCNC: 4.5 G/DL (ref 3.5–5.2)
ALBUMIN/GLOB SERPL: 2 {RATIO} (ref 1–2.5)
ALP SERPL-CCNC: 62 U/L (ref 40–129)
ALT SERPL-CCNC: <5 U/L (ref 10–50)
ANION GAP SERPL CALCULATED.3IONS-SCNC: 17 MMOL/L (ref 9–16)
AST SERPL-CCNC: 27 U/L (ref 10–50)
BILIRUB SERPL-MCNC: 1.3 MG/DL (ref 0–1.2)
BUN SERPL-MCNC: 14 MG/DL (ref 8–23)
CALCIUM SERPL-MCNC: 9.2 MG/DL (ref 8.6–10.4)
CHLORIDE SERPL-SCNC: 96 MMOL/L (ref 98–107)
CO2 SERPL-SCNC: 25 MMOL/L (ref 20–31)
CREAT SERPL-MCNC: 1.2 MG/DL (ref 0.7–1.2)
GFR, ESTIMATED: 61 ML/MIN/1.73M2
GLUCOSE BLD-MCNC: 113 MG/DL (ref 75–110)
GLUCOSE SERPL-MCNC: 91 MG/DL (ref 74–99)
HBV CORE AB SER QL: NONREACTIVE
HBV SURFACE AB SERPL IA-ACNC: <3.5 MIU/ML
INR PPP: 1
PARTIAL THROMBOPLASTIN TIME: 20.9 SEC (ref 23–36.5)
PLATELET # BLD AUTO: NORMAL K/UL (ref 138–453)
PLATELET, FLUORESCENCE: 135 K/UL (ref 138–453)
PLATELETS.RETICULATED NFR BLD AUTO: 4.2 % (ref 1.1–10.3)
POTASSIUM SERPL-SCNC: 4.8 MMOL/L (ref 3.7–5.3)
PROT SERPL-MCNC: 6.8 G/DL (ref 6.6–8.7)
PROTHROMBIN TIME: 13.5 SEC (ref 11.7–14.9)
SODIUM SERPL-SCNC: 138 MMOL/L (ref 136–145)

## 2024-06-28 PROCEDURE — 82947 ASSAY GLUCOSE BLOOD QUANT: CPT

## 2024-06-28 PROCEDURE — 2709999900 IR PORT PLACEMENT > 5 YEARS

## 2024-06-28 PROCEDURE — 6360000002 HC RX W HCPCS: Performed by: RADIOLOGY

## 2024-06-28 PROCEDURE — 36561 INSERT TUNNELED CV CATH: CPT

## 2024-06-28 PROCEDURE — 6360000002 HC RX W HCPCS: Performed by: PHYSICIAN ASSISTANT

## 2024-06-28 PROCEDURE — 85049 AUTOMATED PLATELET COUNT: CPT

## 2024-06-28 PROCEDURE — 76937 US GUIDE VASCULAR ACCESS: CPT

## 2024-06-28 PROCEDURE — 77001 FLUOROGUIDE FOR VEIN DEVICE: CPT

## 2024-06-28 PROCEDURE — 85055 RETICULATED PLATELET ASSAY: CPT

## 2024-06-28 PROCEDURE — 85730 THROMBOPLASTIN TIME PARTIAL: CPT

## 2024-06-28 PROCEDURE — 6360000004 HC RX CONTRAST MEDICATION: Performed by: INTERNAL MEDICINE

## 2024-06-28 PROCEDURE — 85610 PROTHROMBIN TIME: CPT

## 2024-06-28 RX ORDER — SODIUM CHLORIDE 9 MG/ML
INJECTION, SOLUTION INTRAVENOUS CONTINUOUS
Status: DISCONTINUED | OUTPATIENT
Start: 2024-06-28 | End: 2024-07-01 | Stop reason: HOSPADM

## 2024-06-28 RX ORDER — CLINDAMYCIN PHOSPHATE 600 MG/50ML
600 INJECTION, SOLUTION INTRAVENOUS
Status: COMPLETED | OUTPATIENT
Start: 2024-06-28 | End: 2024-06-28

## 2024-06-28 RX ORDER — FENTANYL CITRATE 50 UG/ML
INJECTION, SOLUTION INTRAMUSCULAR; INTRAVENOUS PRN
Status: COMPLETED | OUTPATIENT
Start: 2024-06-28 | End: 2024-06-28

## 2024-06-28 RX ORDER — HEPARIN 100 UNIT/ML
SYRINGE INTRAVENOUS PRN
Status: COMPLETED | OUTPATIENT
Start: 2024-06-28 | End: 2024-06-28

## 2024-06-28 RX ADMIN — CLINDAMYCIN PHOSPHATE 600 MG: 600 INJECTION, SOLUTION INTRAVENOUS at 11:45

## 2024-06-28 RX ADMIN — FENTANYL CITRATE 50 MCG: 50 INJECTION, SOLUTION INTRAMUSCULAR; INTRAVENOUS at 12:50

## 2024-06-28 RX ADMIN — IOHEXOL 22 ML: 240 INJECTION, SOLUTION INTRATHECAL; INTRAVASCULAR; INTRAVENOUS; ORAL at 13:34

## 2024-06-28 RX ADMIN — HEPARIN 500 UNITS: 100 SYRINGE at 13:26

## 2024-06-28 ASSESSMENT — PAIN - FUNCTIONAL ASSESSMENT
PAIN_FUNCTIONAL_ASSESSMENT: NONE - DENIES PAIN

## 2024-06-28 NOTE — TELEPHONE ENCOUNTER
Name: Royal Redd  : 1950  MRN: [id number]    Oncology Navigation Follow-Up Note    Contact Type:  Telephone    Notes: Vy, pt's spouse, called in stating pt completed port placement today.  Vy confirmed  0800 chemotherapy appt @ [facility].  \"Who to Call When\" document mailed to pt.  Will continue to follow.      Electronically signed by Linette William RN on 2024 at 2:09 PM

## 2024-06-28 NOTE — TELEPHONE ENCOUNTER
Chemotherapy orders received:     Ht=72 inches  Xf=075 lbs  BSA=2.04  Chemotherapy doses verified:   Ruxience 375 mg/m2 = 767 mg  Cytoxan 750 mg/m2 = 1530 mg  Vincristine 2 mg flat dose   Adriamycin 50 mg/m2 = 102 mg  Julisa Guzman RN

## 2024-06-28 NOTE — DISCHARGE INSTRUCTIONS
and when you can restart your medicines. He or she will also give you instructions about taking any new medicines.     If you stopped taking aspirin or some other blood thinner, your doctor will tell you when to start taking it again.     Be safe with medicines. Read and follow all instructions on the label.  If the doctor gave you a prescription medicine for pain, take it as prescribed.  If you are not taking a prescription pain medicine, ask your doctor if you can take an over-the-counter medicine.   Incision care    If you have a bandage, your doctor will tell you when you can remove it. After you remove the bandage, you may shower. Wash the area with soap and water and pat it dry. Don't use hydrogen peroxide or alcohol, which can slow healing. You may cover the area with a gauze bandage if it weeps or rubs against clothing. Change the bandage every day.     If you have strips of tape on the cut (incision) the doctor made, leave the tape on for a week or until it falls off.   Other instructions    Always carry the medical alert card that your doctor gives you. It contains information about your port. It will tell health care workers that you have a port in case you need emergency care.     When you get dressed, be careful not to rub the port. Do not wear a bra or suspenders that irritate your skin near the port.   Follow-up care is a key part of your treatment and safety. Be sure to make and go to all appointments, and call your doctor if you are having problems. It's also a good idea to know your test results and keep a list of the medicines you take.  When should you call for help?   Call your doctor now or seek immediate medical care if:    You have signs of infection, such as:  Increased pain, swelling, warmth, or redness.  Red streaks leading from the area.  Pus draining from the area.  A fever.     You have pain or swelling in your neck or arm.     You have trouble breathing.   Watch closely for changes in  your health, and be sure to contact your doctor if:    You have any problems with your line or port.  Questions or concerns call  [phone]   Where can you learn more?  Go to https://www.[employer].net/patientEd and enter M256 to learn more about \"Implanted Port: What to Expect at Home.\"  Current as of: July 26, 2023  Content Version: 14.1  © 2006-2024 [employer].   Care instructions adapted under license by [employer]. If you have questions about a medical condition or this instruction, always ask your healthcare professional. [employer] disclaims any warranty or liability for your use of this information.

## 2024-06-28 NOTE — TELEPHONE ENCOUNTER
Chemotherapy orders received:    Ht=72 inches  Fa=655 lbs  BSA=2.04  Chemotherapy doses verified:   Ruxience 375 mg/m2 = 767 mg  Cytoxan 750 mg/m2 = 1530 mg  Vincristine 2 mg flat dose   Adriamycin 50 mg/m2 = 102 mg  Tiff Tapia RN

## 2024-06-28 NOTE — BRIEF OP NOTE
Brief Postoperative Note for Port Placement    Royal Redd  YOB: 1950  [id number]    Pre-operative Diagnosis: Lymphoma      Post-operative Diagnosis: Same    Procedure: 8 Fr Port Placement    Medication Given: fentanyl    Anesthesia: 1 %Lidocaine, 1% Lidocaine w/ Epi    Surgeons/Assistants: MD Rubén and SHAVON Crowder    Estimated Blood Loss: Minimal    Complications: none    Specimen Removal: None    8 Fr Port placement into the Right IJ. Port flushed with heparin.  Incision site closed with Vicryl sutures and tissue adhesive. Vital signs were reviewed and were stable after the procedure. Patient tolerated the procedure well.    Electronically signed by SHAVON Crowder PA-C on 6/28/2024 at 1:45 PM

## 2024-06-28 NOTE — H&P
History and Physical    Pt Name: Royal Redd  MRN: [id number]  YOB: 1950  Date of evaluation: 6/28/2024  Primary Care Physician: Yulia Gurrola APRN - CNP    SUBJECTIVE:   History of Chief Complaint:    Royal Redd is a 73 y.o. male who is scheduled today for IR PORT PLACEMENT.  He reports having cervical lymphadenopathy and dysphagia for the past couple of months and work-up completed and diagnosed with diffuse large cell non-Hodgkin's lymphoma. He reports continued dysphagia, decreased appetite and fatigue. He denies pain. He had CAD with one stent and reports he has held his Plavix for today's procedure as per direction.   Allergies  is allergic to bee venom and pcn [penicillins].  Medications  Prior to Admission medications    Medication Sig Start Date End Date Taking? Authorizing Provider   allopurinol (ZYLOPRIM) 300 MG tablet Take 1 tablet by mouth daily for 14 days 6/27/24 7/11/24  Catherine Villanueva MD   predniSONE (DELTASONE) 50 MG tablet Take 1 tablet by mouth 2 times daily for 5 days Repeat every 3 weeks 6/24/24 6/29/24  Catherine Villanueva MD   ondansetron (ZOFRAN-ODT) 4 MG disintegrating tablet Take 1 tablet by mouth 3 times daily as needed for Nausea or Vomiting 6/24/24   Catherine Villanueva MD   midodrine (PROAMATINE) 10 MG tablet Take 1 tablet by mouth 3 times daily as needed (low blood pressure) 3/19/24   Yuila Gurrola APRN - CNP   busPIRone (BUSPAR) 10 MG tablet TAKE 1 TABLET THREE TIMES A DAY 3/18/24   Yulia Gurrola APRN - CNP   glucose monitoring kit 1 kit by Does not apply route daily Patient has supplies for a Freestyle Lite currenlty 1/30/24   Yulia Gurrola APRN - CNP   clopidogrel (PLAVIX) 75 MG tablet TAKE 1 TABLET BY MOUTH DAILY 12/21/23   Yulia Gurrola APRN - CNP   citalopram (CELEXA) 10 MG tablet TAKE 1 TABLET BY MOUTH DAILY 12/21/23   Kielmeyer, Yulia J, APRN - CNP   simvastatin (ZOCOR) 80 MG tablet TAKE 1 TABLET BY MOUTH AT   History    has a past medical history of Acquired hypothyroidism, Anxiety, Cerebral microvascular disease, Cervical lymphadenopathy, Coronary artery disease involving native coronary artery of native heart without angina pectoris, Depression, Diabetes mellitus (HCC), Diffuse large cell non-Hodgkin's lymphoma (HCC), Ear infection, Gastroesophageal reflux disease, Headache(784.0), Hx-TIA (transient ischemic attack), Hyperlipidemia, Insomnia, Osteoarthritis, PTSD (post-traumatic stress disorder), S/P drug eluting coronary stent placement, and Unspecified sleep apnea.  Past Surgical History   has a past surgical history that includes Tonsillectomy and adenoidectomy; Foot surgery (Right); Cardiac catheterization (2021); and US BIOPSY LYMPH NODE (2024).  Social History   reports that he quit smoking about 26 years ago. His smoking use included cigarettes. He started smoking about 46 years ago. He has a 60.0 pack-year smoking history. He has never used smokeless tobacco.   reports that he does not currently use alcohol.   reports that he does not currently use drugs after having used the following drugs: Marijuana (Weed), Cocaine, and Other-see comments.  Marital Status    Children 2  Occupation retired factory  Family History  Family Status   Relation Name Status    Mother      Father       family history includes Cancer in his mother; Heart Disease in his father; High Cholesterol in his father; Migraines in his mother; Thyroid Cancer in his mother.  Review of Systems:  CONSTITUTIONAL:   negative for fevers, chills +fatigue   EYES:   negative for double vision, blurred vision and photophobia +glasses   HEENT:   negative for tinnitus, epistaxis and sore throat     RESPIRATORY:   negative for cough, shortness of breath, wheezing  +COPD   CARDIOVASCULAR:   negative for chest pain, palpitations, syncope, edema     GASTROINTESTINAL:   negative for nausea, vomiting  +decreased appetite, denies

## 2024-07-01 ENCOUNTER — PATIENT MESSAGE (OUTPATIENT)
Dept: PRIMARY CARE CLINIC | Age: 74
End: 2024-07-01

## 2024-07-01 ENCOUNTER — OFFICE VISIT (OUTPATIENT)
Dept: ONCOLOGY | Age: 74
End: 2024-07-01
Payer: MEDICARE

## 2024-07-01 ENCOUNTER — HOSPITAL ENCOUNTER (OUTPATIENT)
Dept: INFUSION THERAPY | Age: 74
Discharge: HOME | End: 2024-07-01
Payer: MEDICARE

## 2024-07-01 ENCOUNTER — TELEPHONE (OUTPATIENT)
Dept: ONCOLOGY | Age: 74
End: 2024-07-01

## 2024-07-01 VITALS
TEMPERATURE: 97.5 F | BODY MASS INDEX: 24.36 KG/M2 | OXYGEN SATURATION: 93 % | SYSTOLIC BLOOD PRESSURE: 118 MMHG | WEIGHT: 179.6 LBS | DIASTOLIC BLOOD PRESSURE: 77 MMHG | HEART RATE: 86 BPM | RESPIRATION RATE: 16 BRPM

## 2024-07-01 DIAGNOSIS — C83.30 DIFFUSE LARGE CELL NON-HODGKIN'S LYMPHOMA (HCC): Primary | ICD-10-CM

## 2024-07-01 DIAGNOSIS — F41.9 ANXIETY: ICD-10-CM

## 2024-07-01 DIAGNOSIS — F51.01 PRIMARY INSOMNIA: ICD-10-CM

## 2024-07-01 LAB
ALBUMIN SERPL-MCNC: 4.3 G/DL (ref 3.5–5.2)
ALBUMIN/GLOB SERPL: 1.8 {RATIO} (ref 1–2.5)
ALP SERPL-CCNC: 65 U/L (ref 40–129)
ALT SERPL-CCNC: 7 U/L (ref 5–41)
ANION GAP SERPL CALCULATED.3IONS-SCNC: 12 MMOL/L (ref 9–17)
AST SERPL-CCNC: 19 U/L
BILIRUB SERPL-MCNC: 1.4 MG/DL (ref 0.3–1.2)
BUN SERPL-MCNC: 15 MG/DL (ref 8–23)
CALCIUM SERPL-MCNC: 9.2 MG/DL (ref 8.6–10.4)
CHLORIDE SERPL-SCNC: 99 MMOL/L (ref 98–107)
CO2 SERPL-SCNC: 26 MMOL/L (ref 20–31)
CREAT SERPL-MCNC: 1 MG/DL (ref 0.7–1.2)
GFR, ESTIMATED: 79 ML/MIN/1.73M2
GLUCOSE SERPL-MCNC: 118 MG/DL (ref 70–99)
POTASSIUM SERPL-SCNC: 4.1 MMOL/L (ref 3.7–5.3)
PROT SERPL-MCNC: 6.7 G/DL (ref 6.4–8.3)
SODIUM SERPL-SCNC: 137 MMOL/L (ref 135–144)

## 2024-07-01 PROCEDURE — 96415 CHEMO IV INFUSION ADDL HR: CPT

## 2024-07-01 PROCEDURE — 3017F COLORECTAL CA SCREEN DOC REV: CPT | Performed by: INTERNAL MEDICINE

## 2024-07-01 PROCEDURE — G8428 CUR MEDS NOT DOCUMENT: HCPCS | Performed by: INTERNAL MEDICINE

## 2024-07-01 PROCEDURE — G8420 CALC BMI NORM PARAMETERS: HCPCS | Performed by: INTERNAL MEDICINE

## 2024-07-01 PROCEDURE — 99213 OFFICE O/P EST LOW 20 MIN: CPT | Performed by: INTERNAL MEDICINE

## 2024-07-01 PROCEDURE — 1123F ACP DISCUSS/DSCN MKR DOCD: CPT | Performed by: INTERNAL MEDICINE

## 2024-07-01 PROCEDURE — 6360000002 HC RX W HCPCS: Performed by: INTERNAL MEDICINE

## 2024-07-01 PROCEDURE — 6370000000 HC RX 637 (ALT 250 FOR IP): Performed by: INTERNAL MEDICINE

## 2024-07-01 PROCEDURE — 96367 TX/PROPH/DG ADDL SEQ IV INF: CPT

## 2024-07-01 PROCEDURE — 96413 CHEMO IV INFUSION 1 HR: CPT

## 2024-07-01 PROCEDURE — 96375 TX/PRO/DX INJ NEW DRUG ADDON: CPT

## 2024-07-01 PROCEDURE — 2580000003 HC RX 258: Performed by: INTERNAL MEDICINE

## 2024-07-01 PROCEDURE — 36591 DRAW BLOOD OFF VENOUS DEVICE: CPT

## 2024-07-01 PROCEDURE — 80053 COMPREHEN METABOLIC PANEL: CPT

## 2024-07-01 PROCEDURE — 1036F TOBACCO NON-USER: CPT | Performed by: INTERNAL MEDICINE

## 2024-07-01 PROCEDURE — 96417 CHEMO IV INFUS EACH ADDL SEQ: CPT

## 2024-07-01 PROCEDURE — 96411 CHEMO IV PUSH ADDL DRUG: CPT

## 2024-07-01 RX ORDER — HEPARIN 100 UNIT/ML
500 SYRINGE INTRAVENOUS PRN
Status: DISCONTINUED | OUTPATIENT
Start: 2024-07-01 | End: 2024-07-02 | Stop reason: HOSPADM

## 2024-07-01 RX ORDER — SODIUM CHLORIDE 9 MG/ML
5-250 INJECTION, SOLUTION INTRAVENOUS PRN
Status: DISCONTINUED | OUTPATIENT
Start: 2024-07-01 | End: 2024-07-02 | Stop reason: HOSPADM

## 2024-07-01 RX ORDER — ACETAMINOPHEN 160 MG/5ML
650 LIQUID ORAL ONCE
Status: COMPLETED | OUTPATIENT
Start: 2024-07-01 | End: 2024-07-01

## 2024-07-01 RX ORDER — ONDANSETRON 8 MG/1
8 TABLET, ORALLY DISINTEGRATING ORAL 3 TIMES DAILY PRN
Qty: 60 TABLET | Refills: 3 | Status: SHIPPED | OUTPATIENT
Start: 2024-07-01

## 2024-07-01 RX ORDER — ACETAMINOPHEN 325 MG/1
650 TABLET ORAL ONCE
Status: DISCONTINUED | OUTPATIENT
Start: 2024-07-01 | End: 2024-07-01

## 2024-07-01 RX ORDER — SODIUM CHLORIDE 0.9 % (FLUSH) 0.9 %
5-40 SYRINGE (ML) INJECTION PRN
Status: DISCONTINUED | OUTPATIENT
Start: 2024-07-01 | End: 2024-07-02 | Stop reason: HOSPADM

## 2024-07-01 RX ORDER — PALONOSETRON 0.05 MG/ML
0.25 INJECTION, SOLUTION INTRAVENOUS ONCE
Status: COMPLETED | OUTPATIENT
Start: 2024-07-01 | End: 2024-07-01

## 2024-07-01 RX ORDER — DOXORUBICIN HYDROCHLORIDE 2 MG/ML
50 INJECTION, SOLUTION INTRAVENOUS ONCE
Status: COMPLETED | OUTPATIENT
Start: 2024-07-01 | End: 2024-07-01

## 2024-07-01 RX ORDER — DEXAMETHASONE SODIUM PHOSPHATE 10 MG/ML
10 INJECTION INTRAMUSCULAR; INTRAVENOUS
Status: COMPLETED | OUTPATIENT
Start: 2024-07-01 | End: 2024-07-01

## 2024-07-01 RX ORDER — DIPHENHYDRAMINE HYDROCHLORIDE 50 MG/ML
50 INJECTION INTRAMUSCULAR; INTRAVENOUS ONCE
Status: COMPLETED | OUTPATIENT
Start: 2024-07-01 | End: 2024-07-01

## 2024-07-01 RX ORDER — LIDOCAINE AND PRILOCAINE 25; 25 MG/G; MG/G
CREAM TOPICAL
Qty: 1 EACH | Refills: 3 | Status: SHIPPED | OUTPATIENT
Start: 2024-07-01

## 2024-07-01 RX ADMIN — SODIUM CHLORIDE 800 MG: 9 INJECTION, SOLUTION INTRAVENOUS at 09:39

## 2024-07-01 RX ADMIN — DOXORUBICIN HYDROCHLORIDE 102 MG: 2 INJECTION, SOLUTION INTRAVENOUS at 14:35

## 2024-07-01 RX ADMIN — DIPHENHYDRAMINE HYDROCHLORIDE 50 MG: 50 INJECTION INTRAMUSCULAR; INTRAVENOUS at 09:19

## 2024-07-01 RX ADMIN — SODIUM CHLORIDE 150 MG: 9 INJECTION, SOLUTION INTRAVENOUS at 14:05

## 2024-07-01 RX ADMIN — ACETAMINOPHEN 650 MG: 650 SOLUTION ORAL at 09:18

## 2024-07-01 RX ADMIN — SODIUM CHLORIDE, PRESERVATIVE FREE 10 ML: 5 INJECTION INTRAVENOUS at 08:12

## 2024-07-01 RX ADMIN — Medication 500 UNITS: at 16:17

## 2024-07-01 RX ADMIN — VINCRISTINE SULFATE 2 MG: 1 INJECTION, SOLUTION INTRAVENOUS at 14:41

## 2024-07-01 RX ADMIN — DEXAMETHASONE SODIUM PHOSPHATE 10 MG: 10 INJECTION INTRAMUSCULAR; INTRAVENOUS at 13:41

## 2024-07-01 RX ADMIN — SODIUM CHLORIDE, PRESERVATIVE FREE 10 ML: 5 INJECTION INTRAVENOUS at 16:17

## 2024-07-01 RX ADMIN — PEGFILGRASTIM 6 MG: KIT SUBCUTANEOUS at 16:09

## 2024-07-01 RX ADMIN — PALONOSETRON 0.25 MG: 0.05 INJECTION, SOLUTION INTRAVENOUS at 13:41

## 2024-07-01 RX ADMIN — SODIUM CHLORIDE 150 ML/HR: 9 INJECTION, SOLUTION INTRAVENOUS at 09:18

## 2024-07-01 RX ADMIN — CYCLOPHOSPHAMIDE 1540 MG: 1 INJECTION INTRAVENOUS at 15:12

## 2024-07-01 ASSESSMENT — PAIN SCALES - GENERAL: PAINLEVEL_OUTOF10: 3

## 2024-07-01 ASSESSMENT — PAIN DESCRIPTION - LOCATION: LOCATION: THROAT

## 2024-07-01 NOTE — PROGRESS NOTES
Pt here for C.1D.1. R-CHOP   Arrives ambulatory.  Denies any new complaints.  Labs drawn from port, results reviewed.  Bilirubin 1.4   On call physician Dr. Boyd notified and order received to proceed with treatment.  [facility] chemotherapy teaching completed and consent obtained.   Tx complete without incident.  Pt d/c'd in stable condition.  Returns 7/22/2024 for office visit with Dr. Villanueva and C2D1.

## 2024-07-01 NOTE — TELEPHONE ENCOUNTER
Refills were sent for mail order but patients wife wants a script sent to a local pharmacy as well. Writer unable to pend medication for a refill to both mail order and local pharmacy     Last Visit Date: 5/10/2024  Next Visit Date: 7/25/2024

## 2024-07-01 NOTE — TELEPHONE ENCOUNTER
From: Royal MONZON Tramaine  To: Yulia Gurrola  Sent: 7/1/2024 6:38 PM EDT  Subject: Royal Redd    This is Vy, Royal s wife. Royal is completely out of Buspirone and Midorine. He has not been sleeping the last week on and off. I know this may be nerves of upcoming appointments, but getting sleep to be able to function is important also. Can you call in a week of these medications, or however it’s done, then also to his mail order med place? We were at [facility] today, and I have not been told of medications he was completely out of, or for how long, or I would have called sooner. Please advise, we will be home tomorrow. He has other prescriptions to  at [facility], [address]. Thank you.

## 2024-07-01 NOTE — TELEPHONE ENCOUNTER
met with patient and spouse during treatment. Patient brought in completed [employer] Financial Assistance but does not have updated proof of income yet.  applied for [facility] alec for patient which was approved. Patient given confirmation receipt and will have check mailed to him in 3-7 days.

## 2024-07-01 NOTE — PROGRESS NOTES
Royal  and his wife   were  here for chemotherapy teaching. Spent 60 minutes with them.  Teaching sheets from [facility] and verbal information given on chemotherapy agents, action, administration and side effects.    Chemotherapy medications discussed: ruxience, adriamycin vincrisitne cytoxan   Neulasta OBI     Pregnancy warnings reviewed: not indicated     Chemotherapy consent form signed by patient.    Provided new patient binder, Chemotherapy and You booklet, Eating Hints booklet      Port placement: 6/28     Pt has prescription for EMLA cream and was given instructions on use.    Reviewed take home medication: zofran compazine     Questions answered and support given.    [facility] rehab referral assessment form, distress tool form and PG-SGA form completed by patient.    Needs that were identified during teaching visit: no needs identified at this time     Discussed community resources such as [employer], [facility], [facility], [employer], [employer], etc.    Pt will return on 7/22  for C2D1 and f/u with Dr. Rubi   on 7/22.     Tiff Tapia RN

## 2024-07-02 ENCOUNTER — HOSPITAL ENCOUNTER (EMERGENCY)
Age: 74
Discharge: HOME | End: 2024-07-02
Attending: EMERGENCY MEDICINE
Payer: MEDICARE

## 2024-07-02 ENCOUNTER — TELEPHONE (OUTPATIENT)
Dept: INFUSION THERAPY | Age: 74
End: 2024-07-02

## 2024-07-02 VITALS
DIASTOLIC BLOOD PRESSURE: 77 MMHG | HEART RATE: 72 BPM | HEIGHT: 72 IN | OXYGEN SATURATION: 95 % | TEMPERATURE: 97.7 F | SYSTOLIC BLOOD PRESSURE: 164 MMHG | WEIGHT: 179 LBS | BODY MASS INDEX: 24.24 KG/M2 | RESPIRATION RATE: 16 BRPM

## 2024-07-02 DIAGNOSIS — R22.0 FACIAL SWELLING: Primary | ICD-10-CM

## 2024-07-02 PROCEDURE — 96374 THER/PROPH/DIAG INJ IV PUSH: CPT

## 2024-07-02 PROCEDURE — 96361 HYDRATE IV INFUSION ADD-ON: CPT

## 2024-07-02 PROCEDURE — 6360000002 HC RX W HCPCS: Performed by: NURSE PRACTITIONER

## 2024-07-02 PROCEDURE — 99284 EMERGENCY DEPT VISIT MOD MDM: CPT

## 2024-07-02 PROCEDURE — 96375 TX/PRO/DX INJ NEW DRUG ADDON: CPT

## 2024-07-02 PROCEDURE — 2580000003 HC RX 258: Performed by: NURSE PRACTITIONER

## 2024-07-02 PROCEDURE — 2500000003 HC RX 250 WO HCPCS: Performed by: NURSE PRACTITIONER

## 2024-07-02 RX ORDER — FAMOTIDINE 20 MG/1
20 TABLET, FILM COATED ORAL 2 TIMES DAILY
Qty: 60 TABLET | Refills: 0 | Status: SHIPPED | OUTPATIENT
Start: 2024-07-02

## 2024-07-02 RX ORDER — LORATADINE 10 MG/1
10 TABLET ORAL DAILY
Qty: 30 TABLET | Refills: 0 | Status: SHIPPED | OUTPATIENT
Start: 2024-07-02 | End: 2024-08-01

## 2024-07-02 RX ORDER — BUSPIRONE HYDROCHLORIDE 10 MG/1
TABLET ORAL
Qty: 90 TABLET | Refills: 0 | Status: SHIPPED | OUTPATIENT
Start: 2024-07-02

## 2024-07-02 RX ORDER — 0.9 % SODIUM CHLORIDE 0.9 %
1000 INTRAVENOUS SOLUTION INTRAVENOUS ONCE
Status: COMPLETED | OUTPATIENT
Start: 2024-07-02 | End: 2024-07-02

## 2024-07-02 RX ORDER — DIPHENHYDRAMINE HYDROCHLORIDE 50 MG/ML
25 INJECTION INTRAMUSCULAR; INTRAVENOUS ONCE
Status: COMPLETED | OUTPATIENT
Start: 2024-07-02 | End: 2024-07-02

## 2024-07-02 RX ORDER — PREDNISONE 50 MG/1
50 TABLET ORAL DAILY
Qty: 5 TABLET | Refills: 0 | Status: SHIPPED | OUTPATIENT
Start: 2024-07-02 | End: 2024-07-07

## 2024-07-02 RX ORDER — MIDODRINE HYDROCHLORIDE 10 MG/1
10 TABLET ORAL 3 TIMES DAILY PRN
Qty: 90 TABLET | Refills: 0 | Status: SHIPPED | OUTPATIENT
Start: 2024-07-02

## 2024-07-02 RX ADMIN — DIPHENHYDRAMINE HYDROCHLORIDE 25 MG: 50 INJECTION INTRAMUSCULAR; INTRAVENOUS at 10:11

## 2024-07-02 RX ADMIN — METHYLPREDNISOLONE SODIUM SUCCINATE 125 MG: 125 INJECTION, POWDER, FOR SOLUTION INTRAMUSCULAR; INTRAVENOUS at 10:12

## 2024-07-02 RX ADMIN — SODIUM CHLORIDE 1000 ML: 9 INJECTION, SOLUTION INTRAVENOUS at 10:09

## 2024-07-02 RX ADMIN — FAMOTIDINE 20 MG: 10 INJECTION, SOLUTION INTRAVENOUS at 10:13

## 2024-07-02 NOTE — TELEPHONE ENCOUNTER
Pt's spouse called stating pt had chemotherapy yesterday, and woke up today with bilateral facial swelling from his eyebrows to his neck. Writer notified Dr. Boyd via [facility] and he states for pt to go to ER. Pt's spouse notified and will take him now.

## 2024-07-02 NOTE — DISCHARGE INSTRUCTIONS
Take prednisone daily as directed    Take Pepcid twice daily for the next 10 days    Take Claritin daily    Please follow-up with your primary care provider tomorrow for reevaluation to ensure your symptoms continue to improve    If you develop any tongue swelling, difficulty swallowing, difficulty breathing or any other concerning symptoms please return immediately to the emergency department

## 2024-07-02 NOTE — ED NOTES
Pt given glass water/flluids w/ HOB at approx 75 degrees up; pt having some difficulty drinking  states hard to drink. Pt repositioned and HOB up 90 degrees and tolerating water/fluids well with no coughing or gagging. Wife at bedside.

## 2024-07-02 NOTE — ED PROVIDER NOTES
[facility] Emergency Department      Pt Name: Royal Redd  MRN: [id number]  Birthdate 1950  Date of evaluation: 7/2/2024    EMERGENCY DEPARTMENT ENCOUNTER      PERTINENT ATTENDING PHYSICIAN COMMENTS:      Faculty Attestation    I performed a history and physical examination of the patient and discussed management with the mid level provideer. I reviewed the mid level provider's note and agree with the documented findings and plan of care.Any areas of disagreement are noted on the chart. I was personally present for the key portions of any procedures. I have documented in the chart those procedures where I was not present during the key portions. I have reviewed the emergency nurses triage note. I agree with the chief complaint, past medical history, past surgical history, allergies, medications, social and family history as documented unless otherwise noted below. Documentation of the HPI, Physical Exam and Medical Decision Making performed by medical students or scribes is based on my personal performance of the HPI, PE and MDM. For Residents/Physician Assistant/ Nurse Practitioner cases/documentation I have personally evaluated this patient and have completed at least one if not all key elements of the E/M (history, physical exam, and MDM). Additional findings are as noted.    CHIEF COMPLAINT       Chief Complaint   Patient presents with    Facial Swelling     Hx Nonhodgkins Lymphoma dx 2 months ago and first chemo treatment yesterday (Monday); woke up with Rt side facial swelling under Rt eye, Rt cheek and Rt jaw; denies new SOB but sl trouble swallowing.       HISTORY OF PRESENT ILLNESS    Royal Redd is a 74 y.o. male who presents to the emergency department states that he woke up this morning with facial swelling more towards his right cheek and right eye.  He denies any difficulty breathing he has chronic difficulty swallowing due to a large neck mass that has gotten worse

## 2024-07-02 NOTE — ED PROVIDER NOTES
[facility] EMERGENCY DEPARTMENT  EMERGENCY DEPARTMENT ENCOUNTER      Pt Name: Royal Redd  MRN: [id number]  Birthdate 1950  Date of evaluation: 7/2/2024  Provider: LINUS Mcfadden CNP    CHIEF COMPLAINT       Chief Complaint   Patient presents with    Facial Swelling     Hx Nonhodgkins Lymphoma dx 2 months ago and first chemo treatment yesterday (Monday); woke up with Rt side facial swelling under Rt eye, Rt cheek and Rt jaw; denies new SOB but sl trouble swallowing.         HISTORY OF PRESENT ILLNESS   (Location/Symptom, Timing/Onset, Context/Setting, Quality, Duration, Modifying Factors, Severity)  Note limiting factors.   Royal Redd is a 74 y.o. male who presents to the emergency department for evaluation of facial swelling and neck swelling.  Patient has history of non-Hodgkin's lymphoma and thyroid cancer.  He started treatment of chemotherapy yesterday.  He states he also had another treatment of some kind 2 days ago.  He did not have any of his prescribed medications today.  His wife states that she had to crush up his foods and medications last night due to the swelling.  She states she called the doctor's office today and told them about the continued swelling and they told him to come into the emergency department for evaluation and treatment.  Patient denies any difficulty breathing or swallowing at this time.  He states his throat is sore when talking.  He denies any shortness of breath, chest pain, abdominal pain, nausea, vomiting or diarrhea.  Denies any tongue swelling or drooling. He is maintaining own secretions.        In review of EMR on 6/28/2024 patient had reportedly complained of cervical lymphadenopathy and dysphagia for couple months prior to diagnosis.       Nursing Notes were reviewed.    REVIEW OF SYSTEMS       Constitutional: No fevers or chills   HEENT: No sore throat, rhinorrhea, or earache + sore throat + enlarged cervical lymph/ history of  appearing right medial renal cyst, no dedicated follow-up needed.    IMPRESSION:    1.   Deauville 5 hypermetabolic neoplasm involving the left neck, bilateral thyroid, with extension into the upper mediastinum as described.  2.  Focal punctate hypermetabolism left mid peripheral zone prostate, concerning for clinically significant prostate cancer.  3.  Indeterminate nodular hypermetabolism between the esophagus and aorta.  This likely reflects additional disease involvement of the Deauville 5 paraesophageal/hilar lymph node.  Primary esophageal neoplasm could appear similarly, but felt less likely.  Attention on follow-up versus consider  correlation with direct inspection.  4.  Infrarenal abdominal aortic aneurysm measuring up to 5 cm.  Recommend vascular consultation.    These findings were communicated to the referring clinical team and documented in EPIC results tracking.        Workstation:[id number]    Finalized by Joss Uriostegui on 6/25/2024 12:13 PM        DIAGNOSTIC RESULTS     EKG: All EKG's are interpreted by the Emergency Department Physician who either signs or Co-signs this chart in the absence of a cardiologist.        RADIOLOGY:   Non-plain film images such as CT, Ultrasound and MRI are read by the radiologist. Plain radiographic images are visualized and preliminarily interpreted by the emergency physician with the below findings:      Interpretation per the Radiologist below, if available at the time of this note:    No orders to display         ED BEDSIDE ULTRASOUND:   Performed by ED Physician - none    LABS:  Labs Reviewed - No data to display    All other labs were within normal range or not returned as of this dictation.    EMERGENCY DEPARTMENT COURSE and DIFFERENTIAL DIAGNOSIS/MDM:   Vitals:    Vitals:    07/02/24 1027 07/02/24 1057 07/02/24 1157 07/02/24 1257   BP:       Pulse:       Resp:       Temp:       TempSrc:       SpO2: 95% 97% 98% 95%   Weight:       Height:                 REASSESSMENT  37.1

## 2024-07-03 ENCOUNTER — HOSPITAL ENCOUNTER (OUTPATIENT)
Age: 74
Discharge: HOME | End: 2024-07-05
Attending: INTERNAL MEDICINE
Payer: MEDICARE

## 2024-07-03 ENCOUNTER — CLINICAL DOCUMENTATION (OUTPATIENT)
Dept: ONCOLOGY | Age: 74
End: 2024-07-03

## 2024-07-03 VITALS — HEIGHT: 72 IN | BODY MASS INDEX: 24.24 KG/M2 | WEIGHT: 179 LBS

## 2024-07-03 DIAGNOSIS — Z79.899 LONG-TERM USE OF HIGH-RISK MEDICATION: ICD-10-CM

## 2024-07-03 PROCEDURE — 93306 TTE W/DOPPLER COMPLETE: CPT

## 2024-07-03 NOTE — PROGRESS NOTES
[facility] Nutrition Screen:    PG-SGA screening form reviewed. Point score = 9. Pt reports unintentional weight loss, no appetite/no interest in eating, problems swallowing, dry mouth, and feeling not my normal self, but able to be up and about with fairly normal activities. RD referral/nutrition evaluation indicated for scores > 4. Full nutrition assessment to follow.

## 2024-07-06 LAB
ECHO AO ASC DIAM: 3.4 CM
ECHO AO ASCENDING AORTA INDEX: 1.67 CM/M2
ECHO AO ROOT DIAM: 3.8 CM
ECHO AO ROOT INDEX: 1.87 CM/M2
ECHO AV AREA PEAK VELOCITY: 3.7 CM2
ECHO AV AREA VTI: 29.2 CM2
ECHO AV AREA/BSA PEAK VELOCITY: 1.8 CM2/M2
ECHO AV AREA/BSA VTI: 14.4 CM2/M2
ECHO AV MEAN GRADIENT: 3 MMHG
ECHO AV MEAN VELOCITY: 0.8 M/S
ECHO AV PEAK GRADIENT: 6 MMHG
ECHO AV PEAK VELOCITY: 1.2 M/S
ECHO AV VTI: 29.2 CM
ECHO BSA: 2.03 M2
ECHO EST RA PRESSURE: 5 MMHG
ECHO IVC EXP: 1.6 CM
ECHO LA AREA 2C: 22.3 CM2
ECHO LA DIAMETER INDEX: 2.07 CM/M2
ECHO LA DIAMETER: 4.2 CM
ECHO LA TO AORTIC ROOT RATIO: 1.11
ECHO LA VOL A-L A2C: 64 ML (ref 18–58)
ECHO LA VOL A-L A4C: 71 ML (ref 18–58)
ECHO LA VOL BP: 67 ML (ref 18–58)
ECHO LA VOL/BSA BIPLANE: 33 ML/M2 (ref 16–34)
ECHO LA VOLUME INDEX A-L A2C: 32 ML/M2 (ref 16–34)
ECHO LA VOLUME INDEX A-L A4C: 35 ML/M2 (ref 16–34)
ECHO LV E' LATERAL VELOCITY: 11 CM/S
ECHO LV E' SEPTAL VELOCITY: 8 CM/S
ECHO LV EDV A2C: 70 ML
ECHO LV EDV A4C: 85 ML
ECHO LV EDV BP: 77 ML (ref 67–155)
ECHO LV EDV INDEX A4C: 42 ML/M2
ECHO LV EDV INDEX BP: 38 ML/M2
ECHO LV EDV NDEX A2C: 34 ML/M2
ECHO LV EJECTION FRACTION BIPLANE: 65 % (ref 55–100)
ECHO LV ESV A2C: 24 ML
ECHO LV ESV A4C: 31 ML
ECHO LV ESV BP: 27 ML (ref 22–58)
ECHO LV ESV INDEX A2C: 12 ML/M2
ECHO LV ESV INDEX A4C: 15 ML/M2
ECHO LV ESV INDEX BP: 13 ML/M2
ECHO LV FRACTIONAL SHORTENING: 32 % (ref 28–44)
ECHO LV GLOBAL LONGITUDINAL STRAIN (GLS): -21.4 %
ECHO LV INTERNAL DIMENSION DIASTOLE INDEX: 2.46 CM/M2
ECHO LV INTERNAL DIMENSION DIASTOLIC: 5 CM (ref 4.2–5.9)
ECHO LV INTERNAL DIMENSION SYSTOLIC INDEX: 1.67 CM/M2
ECHO LV INTERNAL DIMENSION SYSTOLIC: 3.4 CM
ECHO LV IVSD: 1.1 CM (ref 0.6–1)
ECHO LV MASS 2D: 207.1 G (ref 88–224)
ECHO LV MASS INDEX 2D: 102 G/M2 (ref 49–115)
ECHO LV POSTERIOR WALL DIASTOLIC: 1.1 CM (ref 0.6–1)
ECHO LV RELATIVE WALL THICKNESS RATIO: 0.44
ECHO LVOT AREA: 3.5 CM2
ECHO LVOT AV VTI INDEX: 0.95
ECHO LVOT DIAM: 2.1 CM
ECHO LVOT STROKE VOLUME INDEX: 47.1 ML/M2
ECHO LVOT SV: 95.5 ML
ECHO LVOT VTI: 27.6 CM
ECHO MV A VELOCITY: 0.53 M/S
ECHO MV AREA PHT: 4 CM2
ECHO MV AREA VTI: 3 CM2
ECHO MV E DECELERATION TIME (DT): 144 MS
ECHO MV E VELOCITY: 0.78 M/S
ECHO MV E/A RATIO: 1.47
ECHO MV E/E' LATERAL: 7.09
ECHO MV E/E' RATIO (AVERAGED): 8.42
ECHO MV E/E' SEPTAL: 9.75
ECHO MV LVOT VTI INDEX: 1.16
ECHO MV MAX VELOCITY: 0.1 M/S
ECHO MV MEAN GRADIENT: 1 MMHG
ECHO MV MEAN VELOCITY: 0.5 M/S
ECHO MV PEAK GRADIENT: 4 MMHG
ECHO MV PRESSURE HALF TIME (PHT): 55 MS
ECHO MV VTI: 32.1 CM
ECHO PV PEAK GRADIENT: 3 MMHG
ECHO PV REGURGITANT END DIASTOLIC MAX VELOCITY: 0.9 M/S
ECHO RA AREA 4C: 15.6 CM2
ECHO RV FREE WALL PEAK S': 11 CM/S
ECHO RV INTERNAL DIMENSION: 3.6 CM
ECHO RV TAPSE: 2 CM (ref 1.7–?)
ECHO TV MEAN VELOCITY: 84 M/S
ECHO TV PEAK GRADIENT: 3 MMHG

## 2024-07-11 ENCOUNTER — CLINICAL DOCUMENTATION (OUTPATIENT)
Dept: ONCOLOGY | Age: 74
End: 2024-07-11

## 2024-07-11 ENCOUNTER — TELEPHONE (OUTPATIENT)
Dept: INFUSION THERAPY | Age: 74
End: 2024-07-11

## 2024-07-11 ENCOUNTER — APPOINTMENT (OUTPATIENT)
Dept: CT IMAGING | Age: 74
End: 2024-07-11
Payer: MEDICARE

## 2024-07-11 ENCOUNTER — HOSPITAL ENCOUNTER (EMERGENCY)
Age: 74
Discharge: HOME | End: 2024-07-11
Attending: EMERGENCY MEDICINE
Payer: MEDICARE

## 2024-07-11 ENCOUNTER — APPOINTMENT (OUTPATIENT)
Dept: GENERAL RADIOLOGY | Age: 74
End: 2024-07-11
Payer: MEDICARE

## 2024-07-11 VITALS
TEMPERATURE: 98.4 F | HEIGHT: 72 IN | WEIGHT: 178 LBS | BODY MASS INDEX: 24.11 KG/M2 | DIASTOLIC BLOOD PRESSURE: 82 MMHG | HEART RATE: 76 BPM | OXYGEN SATURATION: 96 % | SYSTOLIC BLOOD PRESSURE: 149 MMHG | RESPIRATION RATE: 18 BRPM

## 2024-07-11 DIAGNOSIS — R07.9 CHEST PAIN, UNSPECIFIED TYPE: Primary | ICD-10-CM

## 2024-07-11 DIAGNOSIS — R79.89 ELEVATED TSH: ICD-10-CM

## 2024-07-11 LAB
ALBUMIN SERPL-MCNC: 3.2 G/DL (ref 3.5–5.2)
ALBUMIN/GLOB SERPL: 1.3 {RATIO} (ref 1–2.5)
ALP SERPL-CCNC: 57 U/L (ref 40–129)
ALT SERPL-CCNC: 76 U/L (ref 5–41)
ANION GAP SERPL CALCULATED.3IONS-SCNC: 7 MMOL/L (ref 9–17)
AST SERPL-CCNC: 54 U/L
BASOPHILS # BLD: 0 K/UL (ref 0–0.2)
BASOPHILS NFR BLD: 0 % (ref 0–2)
BILIRUB SERPL-MCNC: 0.5 MG/DL (ref 0.3–1.2)
BILIRUB UR QL STRIP: NEGATIVE
BNP SERPL-MCNC: 350 PG/ML
BUN SERPL-MCNC: 14 MG/DL (ref 8–23)
CALCIUM SERPL-MCNC: 8.3 MG/DL (ref 8.6–10.4)
CHARACTER UR: ABNORMAL
CHLORIDE SERPL-SCNC: 99 MMOL/L (ref 98–107)
CLARITY UR: CLEAR
CO2 SERPL-SCNC: 27 MMOL/L (ref 20–31)
COLOR UR: YELLOW
CREAT SERPL-MCNC: 1 MG/DL (ref 0.7–1.2)
D DIMER PPP FEU-MCNC: 3.5 UG/ML FEU
EOSINOPHIL # BLD: 0 K/UL (ref 0–0.4)
EOSINOPHILS RELATIVE PERCENT: 0 % (ref 1–4)
EPI CELLS #/AREA URNS HPF: ABNORMAL /HPF (ref 0–5)
ERYTHROCYTE [DISTWIDTH] IN BLOOD BY AUTOMATED COUNT: 13.9 % (ref 12.5–15.4)
GFR, ESTIMATED: 79 ML/MIN/1.73M2
GLUCOSE SERPL-MCNC: 153 MG/DL (ref 70–99)
GLUCOSE UR STRIP-MCNC: NEGATIVE MG/DL
HCT VFR BLD AUTO: 40.3 % (ref 41–53)
HGB BLD-MCNC: 13.6 G/DL (ref 13.5–17.5)
HGB UR QL STRIP.AUTO: NEGATIVE
KETONES UR STRIP-MCNC: NEGATIVE MG/DL
LACTATE BLDV-SCNC: 1.5 MMOL/L (ref 0.5–2.2)
LACTATE BLDV-SCNC: 2.3 MMOL/L (ref 0.5–2.2)
LEUKOCYTE ESTERASE UR QL STRIP: NEGATIVE
LIPASE SERPL-CCNC: 18 U/L (ref 13–60)
LYMPHOCYTES NFR BLD: 0.91 K/UL (ref 1–4.8)
LYMPHOCYTES RELATIVE PERCENT: 16 % (ref 24–44)
MCH RBC QN AUTO: 30 PG (ref 26–34)
MCHC RBC AUTO-ENTMCNC: 33.7 G/DL (ref 31–37)
MCV RBC AUTO: 89.1 FL (ref 80–100)
MONOCYTES NFR BLD: 0.57 K/UL (ref 0.1–0.8)
MONOCYTES NFR BLD: 10 % (ref 1–7)
MORPHOLOGY: ABNORMAL
MORPHOLOGY: ABNORMAL
NEUTROPHILS NFR BLD: 74 % (ref 36–66)
NEUTS SEG NFR BLD: 4.22 K/UL (ref 1.8–7.7)
NITRITE UR QL STRIP: NEGATIVE
PH UR STRIP: 7 [PH] (ref 5–8)
PLATELET # BLD AUTO: 78 K/UL (ref 140–450)
PMV BLD AUTO: 9.4 FL (ref 6–12)
POTASSIUM SERPL-SCNC: 3.7 MMOL/L (ref 3.7–5.3)
PROT SERPL-MCNC: 5.7 G/DL (ref 6.4–8.3)
PROT UR STRIP-MCNC: NEGATIVE MG/DL
RBC # BLD AUTO: 4.52 M/UL (ref 4.5–5.9)
RBC #/AREA URNS HPF: ABNORMAL /HPF (ref 0–2)
SARS-COV-2 RDRP RESP QL NAA+PROBE: NOT DETECTED
SODIUM SERPL-SCNC: 133 MMOL/L (ref 135–144)
SP GR UR STRIP: 1.01 (ref 1–1.03)
SPECIMEN DESCRIPTION: NORMAL
T4 FREE SERPL-MCNC: 1.3 NG/DL (ref 0.92–1.68)
TROPONIN I SERPL HS-MCNC: 16 NG/L (ref 0–22)
TROPONIN I SERPL HS-MCNC: 16 NG/L (ref 0–22)
TSH SERPL DL<=0.05 MIU/L-ACNC: 6.98 UIU/ML (ref 0.3–5)
UROBILINOGEN UR STRIP-ACNC: NORMAL EU/DL (ref 0–1)
WBC #/AREA URNS HPF: ABNORMAL /HPF (ref 0–5)
WBC OTHER # BLD: 5.7 K/UL (ref 3.5–11)

## 2024-07-11 PROCEDURE — 83605 ASSAY OF LACTIC ACID: CPT

## 2024-07-11 PROCEDURE — 84443 ASSAY THYROID STIM HORMONE: CPT

## 2024-07-11 PROCEDURE — 70450 CT HEAD/BRAIN W/O DYE: CPT

## 2024-07-11 PROCEDURE — 71046 X-RAY EXAM CHEST 2 VIEWS: CPT

## 2024-07-11 PROCEDURE — 83880 ASSAY OF NATRIURETIC PEPTIDE: CPT

## 2024-07-11 PROCEDURE — 87040 BLOOD CULTURE FOR BACTERIA: CPT

## 2024-07-11 PROCEDURE — 81001 URINALYSIS AUTO W/SCOPE: CPT

## 2024-07-11 PROCEDURE — 2580000003 HC RX 258: Performed by: NURSE PRACTITIONER

## 2024-07-11 PROCEDURE — 83690 ASSAY OF LIPASE: CPT

## 2024-07-11 PROCEDURE — 84439 ASSAY OF FREE THYROXINE: CPT

## 2024-07-11 PROCEDURE — 84484 ASSAY OF TROPONIN QUANT: CPT

## 2024-07-11 PROCEDURE — 71260 CT THORAX DX C+: CPT

## 2024-07-11 PROCEDURE — 36415 COLL VENOUS BLD VENIPUNCTURE: CPT

## 2024-07-11 PROCEDURE — 85025 COMPLETE CBC W/AUTO DIFF WBC: CPT

## 2024-07-11 PROCEDURE — 99285 EMERGENCY DEPT VISIT HI MDM: CPT

## 2024-07-11 PROCEDURE — 6360000004 HC RX CONTRAST MEDICATION: Performed by: NURSE PRACTITIONER

## 2024-07-11 PROCEDURE — 87635 SARS-COV-2 COVID-19 AMP PRB: CPT

## 2024-07-11 PROCEDURE — 93005 ELECTROCARDIOGRAM TRACING: CPT | Performed by: NURSE PRACTITIONER

## 2024-07-11 PROCEDURE — 80053 COMPREHEN METABOLIC PANEL: CPT

## 2024-07-11 PROCEDURE — 85379 FIBRIN DEGRADATION QUANT: CPT

## 2024-07-11 RX ORDER — 0.9 % SODIUM CHLORIDE 0.9 %
80 INTRAVENOUS SOLUTION INTRAVENOUS ONCE
Status: DISCONTINUED | OUTPATIENT
Start: 2024-07-11 | End: 2024-07-11 | Stop reason: HOSPADM

## 2024-07-11 RX ORDER — 0.9 % SODIUM CHLORIDE 0.9 %
1000 INTRAVENOUS SOLUTION INTRAVENOUS ONCE
Status: COMPLETED | OUTPATIENT
Start: 2024-07-11 | End: 2024-07-11

## 2024-07-11 RX ORDER — SODIUM CHLORIDE 0.9 % (FLUSH) 0.9 %
10 SYRINGE (ML) INJECTION PRN
Status: DISCONTINUED | OUTPATIENT
Start: 2024-07-11 | End: 2024-07-11 | Stop reason: HOSPADM

## 2024-07-11 RX ADMIN — SODIUM CHLORIDE, PRESERVATIVE FREE 10 ML: 5 INJECTION INTRAVENOUS at 17:43

## 2024-07-11 RX ADMIN — SODIUM CHLORIDE 1000 ML: 9 INJECTION, SOLUTION INTRAVENOUS at 16:14

## 2024-07-11 RX ADMIN — Medication 100 ML: at 17:43

## 2024-07-11 RX ADMIN — IOPAMIDOL 75 ML: 755 INJECTION, SOLUTION INTRAVENOUS at 17:43

## 2024-07-11 ASSESSMENT — ENCOUNTER SYMPTOMS
DIARRHEA: 0
NAUSEA: 1
BACK PAIN: 0
VOMITING: 0
ABDOMINAL PAIN: 0
CONSTIPATION: 0
COUGH: 0
SHORTNESS OF BREATH: 0
EYE DISCHARGE: 0

## 2024-07-11 ASSESSMENT — PAIN - FUNCTIONAL ASSESSMENT: PAIN_FUNCTIONAL_ASSESSMENT: 0-10

## 2024-07-11 ASSESSMENT — PAIN SCALES - GENERAL: PAINLEVEL_OUTOF10: 0

## 2024-07-11 NOTE — ED PROVIDER NOTES
estimated right atrial pressure is normal (~3 mmHg).  Pericardium No pericardial effusion.      Labs: ordered.  Radiology: ordered.  ECG/medicine tests: ordered.    Risk  Prescription drug management.            REASSESSMENT     ED Course as of 07/11/24 1552   Thu Jul 11, 2024   1437 EKG done in the emerged part shows ventricular rate of 83, ME of 142, QRS of 90, QTc of 441.  No large ST elevation or ST depressions.  Does inverted T wave in 3 no STEMI [AT]      ED Course User Index  [AT] To, Jonathan BRICH, DO         CRITICAL CARE TIME   Total Critical Care time was  minutes, excluding separately reportable procedures.  There was a high probability of clinically significant/life threatening deterioration in the patient's condition which required my urgent intervention.      CONSULTS:  None    PROCEDURES:  Unless otherwise noted below, none     Procedures        FINAL IMPRESSION    No diagnosis found.      DISPOSITION/PLAN   DISPOSITION        PATIENT REFERRED TO:  No follow-up provider specified.    DISCHARGE MEDICATIONS:  New Prescriptions    No medications on file     Controlled Substances Monitoring:     RX Monitoring Periodic Controlled Substance Monitoring   4/12/2021   4:19 PM No signs of potential drug abuse or diversion identified.       (Please note that portions of this note were completed with a voice recognition program.  Efforts were made to edit the dictations but occasionally words are mis-transcribed.)    LINUS Coffman - CNP (electronically signed)           that portions of this note were completed with a voice recognition program.  Efforts were made to edit the dictations but occasionally words are mis-transcribed.)    LINUS Coffman CNP (electronically signed)             Lacie Dubois APRN - CNP  07/16/24 1013

## 2024-07-11 NOTE — TELEPHONE ENCOUNTER
Pt's wife called stating pt has been feeling well, and she is concerned the tx is not working. She called to understand what side effects should be expected. She did state pt was having what she thought were panic attacks three times yesterday. She described the first attack as him standing up and having a \"scared\" look on his face. She states he just froze and then \"came back\" from it. She said the second occurred when he was sitting in his chair and his dog was on the right side, but he reached for the dog on his left said. He told his wife he saw the dog move to his other side, but she states the dog did not move. The third occurrence he was getting a glass of water from the sink and placed in on the table. She states he did an \"OCD\" and \"purposeful\" motion where he kept turning from the sink to the table touching the glass to the table every time. She states he said \"did you see that? The glass is moving on its own\". The wife states he then froze again and had a scared look on his face before \"coming back\". Writer asked if pt has any one sided weakness, slurred speech, or facial drooping. His wife denied any of these sx. Writer advised for pt to go to ER for evaluation to r/o seizures or disease progression. Pt's wife verbalized understanding and will take him now.

## 2024-07-11 NOTE — DISCHARGE INSTRUCTIONS
Return to the ER: Fevers, continued or worsening chest pain, breathing difficulty, confusion, weakness, vomiting; or any other concerning symptoms.

## 2024-07-11 NOTE — PROGRESS NOTES
[facility] Oncology Nutrition Note:   Chart reviewed d/t positive nutrition screen (PG-SGA score of 9). Noted patient is currently in [facility] ED. Will attempt to contact patient at a later date.

## 2024-07-11 NOTE — ED PROVIDER NOTES
[facility] Emergency Department  [address]  [facility] [address]  Phone: [phone]  Fax: [phone]      Attending Physician Attestation    I performed a history and physical examination of the patient and discussed management with the mid level provider. I reviewed the mid level provider's note and agree with the documented findings and plan of care. Any areas of disagreement are noted on the chart. I was personally present for the key portions of any procedures. I have documented in the chart those procedures where I was not present during the key portions. I have reviewed the emergency nurses triage note. I agree with the chief complaint, past medical history, past surgical history, allergies, medications, social and family history as documented unless otherwise noted below. Documentation of the HPI, Physical Exam and Medical Decision Making performed by mid level providers is based on my personal performance of the HPI, PE and MDM. For Physician Assistant/ Nurse Practitioner cases/documentation I have personally evaluated this patient and have completed at least one if not all key elements of the E/M (history, physical exam, and MDM). Additional findings are as noted.      CHIEF COMPLAINT       Chief Complaint   Patient presents with    Dizziness     Patient c/o dizziness and back pain that started yesterday but last time this happened something cardiac related was wrong. Patient states he has diffused chest pain when he lays flat and in the morning. Patient denies chest pain at this time. Patient is currently receiving chemo and the last treatment was a couple of weeks ago. Pt has non-hodgkin's lymphoma.     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 Shared decision making was utilized in the discharge decision and patient/family in agreement with current plan of care. Patient told to return to ED for any worsening symptoms. Patient remains stable, will discharge home. They were given the opportunity to ask any questions regarding their care. These questions were answered to their satisfaction. Patient/family understands that early in the process of an illness or injury, an emergency department workup can be falsely reassuring and will return if symptoms worsen.     Impression:   1. Chest pain, unspecified type    2. Elevated TSH        CONDITION ON DISPOSITION:   Stable    PATIENT REFERRED TO:  Yulia Gurrola, APRN - CNP  [address] [id number] [phone]    Schedule an appointment as soon as possible for a visit   Schedule Follow up appointment with regarding TSH      DISCHARGE MEDICATIONS:  Discharge Medication List as of 7/11/2024  7:42 PM               This note was created using Dragon dictation software. The note was briefly reviewed and proofread, but may contain some grammatical and phonetic errors.    Jonathan Alvarez DO,  Emergency Medicine Physician       Jonathan Alvarez DO  07/12/24 0132

## 2024-07-12 ENCOUNTER — TELEPHONE (OUTPATIENT)
Dept: ONCOLOGY | Age: 74
End: 2024-07-12

## 2024-07-12 LAB
EKG ATRIAL RATE: 83 BPM
EKG P AXIS: 28 DEGREES
EKG P-R INTERVAL: 142 MS
EKG Q-T INTERVAL: 376 MS
EKG QRS DURATION: 90 MS
EKG QTC CALCULATION (BAZETT): 441 MS
EKG R AXIS: -6 DEGREES
EKG T AXIS: 30 DEGREES
EKG VENTRICULAR RATE: 83 BPM

## 2024-07-12 NOTE — TELEPHONE ENCOUNTER
Name: Royal Redd  : 1950  MRN: [id number]    Oncology Navigation Follow-Up Note    Contact Type:  Telephone    Notes: Vy, pt's spouse, called in requesting pt's chart \"locked down\".  Per Vy, 2 of pt's adopted daughter's showed up @ ER & obtained medical information.  Vy stated Lacie & Laura.  Upon review of chart noted 2024 [facility] HIPAA form scanned in media & 2024 [facility] HIPAA form scanned in media.  Noted 2024 form dated 04.  Vy updated on findings & requested pt complete updated  HIPAA release form @ next visit.  Notified Vy writer will request documentation Lacie & Laura not to receive pt information.   patient advocate contact # given to Vy.  Spoke w/Ally, PCC , requested chart note no information to Lacie & Laura.  Notified Ally pt will complete new HIPAA form @  appt & appt note updated.  Will continue to follow.      Electronically signed by Linette William RN on 2024 at 8:22 AM

## 2024-07-14 LAB
MICROORGANISM SPEC CULT: NORMAL
MICROORGANISM SPEC CULT: NORMAL
SERVICE CMNT-IMP: NORMAL
SERVICE CMNT-IMP: NORMAL
SPECIMEN DESCRIPTION: NORMAL
SPECIMEN DESCRIPTION: NORMAL

## 2024-07-15 ENCOUNTER — TELEPHONE (OUTPATIENT)
Dept: ONCOLOGY | Age: 74
End: 2024-07-15

## 2024-07-15 ENCOUNTER — TELEPHONE (OUTPATIENT)
Dept: PRIMARY CARE CLINIC | Age: 74
End: 2024-07-15

## 2024-07-15 NOTE — TELEPHONE ENCOUNTER
Pt's wife called to ask if it is safe for pt to take a stool softener daily? Also if pt's oxybutynin could be increased to patient taking twice daily or increase dose to decrease urinary incontinence.     Please advise

## 2024-07-15 NOTE — TELEPHONE ENCOUNTER
Name: Royal Redd  : 1950  MRN: [id number]    Oncology Navigation Follow-Up Note    Contact Type:  Telephone    Notes: Pt's spouse, Vy, left VM after hours stating pt constipated & no BM x3 days.  Attempted to contact Vy, no answer, VM left instructed to contact [facility] triage nurse w/symptoms, contact # given, updated on writer's business hours, & encouraged to contact prn.  Will continue to follow.      Electronically signed by Linette William RN on 7/15/2024 at 9:16 AM

## 2024-07-15 NOTE — TELEPHONE ENCOUNTER
Okay to take stool softener.  I would wait until his appointment with Yulia on the 25th to further discuss oxybutynin

## 2024-07-22 ENCOUNTER — TELEPHONE (OUTPATIENT)
Dept: ONCOLOGY | Age: 74
End: 2024-07-22

## 2024-07-22 ENCOUNTER — OFFICE VISIT (OUTPATIENT)
Dept: ONCOLOGY | Age: 74
End: 2024-07-22
Payer: MEDICARE

## 2024-07-22 ENCOUNTER — HOSPITAL ENCOUNTER (OUTPATIENT)
Dept: INFUSION THERAPY | Age: 74
Discharge: HOME | End: 2024-07-22
Payer: MEDICARE

## 2024-07-22 VITALS
TEMPERATURE: 96.8 F | OXYGEN SATURATION: 98 % | HEART RATE: 81 BPM | WEIGHT: 177.7 LBS | DIASTOLIC BLOOD PRESSURE: 80 MMHG | BODY MASS INDEX: 24.1 KG/M2 | SYSTOLIC BLOOD PRESSURE: 111 MMHG | RESPIRATION RATE: 18 BRPM

## 2024-07-22 DIAGNOSIS — C83.30 DIFFUSE LARGE CELL NON-HODGKIN'S LYMPHOMA (HCC): Primary | ICD-10-CM

## 2024-07-22 DIAGNOSIS — R59.0 CERVICAL LYMPHADENOPATHY: ICD-10-CM

## 2024-07-22 LAB
ALBUMIN SERPL-MCNC: 4 G/DL (ref 3.5–5.2)
ALBUMIN/GLOB SERPL: 1.8 {RATIO} (ref 1–2.5)
ALP SERPL-CCNC: 60 U/L (ref 40–129)
ALT SERPL-CCNC: 14 U/L (ref 5–41)
ANION GAP SERPL CALCULATED.3IONS-SCNC: 8 MMOL/L (ref 9–17)
AST SERPL-CCNC: 11 U/L
BASOPHILS # BLD: 0 K/UL (ref 0–0.2)
BASOPHILS NFR BLD: 0 % (ref 0–2)
BILIRUB SERPL-MCNC: 0.5 MG/DL (ref 0.3–1.2)
BUN SERPL-MCNC: 14 MG/DL (ref 8–23)
CALCIUM SERPL-MCNC: 8.7 MG/DL (ref 8.6–10.4)
CHLORIDE SERPL-SCNC: 103 MMOL/L (ref 98–107)
CO2 SERPL-SCNC: 27 MMOL/L (ref 20–31)
CREAT SERPL-MCNC: 1 MG/DL (ref 0.7–1.2)
EOSINOPHIL # BLD: 0.11 K/UL (ref 0–0.4)
EOSINOPHILS RELATIVE PERCENT: 1 % (ref 1–4)
ERYTHROCYTE [DISTWIDTH] IN BLOOD BY AUTOMATED COUNT: 14.2 % (ref 12.5–15.4)
GFR, ESTIMATED: 79 ML/MIN/1.73M2
GLUCOSE SERPL-MCNC: 111 MG/DL (ref 70–99)
HCT VFR BLD AUTO: 40.2 % (ref 41–53)
HGB BLD-MCNC: 13.5 G/DL (ref 13.5–17.5)
LYMPHOCYTES NFR BLD: 1.22 K/UL (ref 1–4.8)
LYMPHOCYTES RELATIVE PERCENT: 11 % (ref 24–44)
MCH RBC QN AUTO: 30.3 PG (ref 26–34)
MCHC RBC AUTO-ENTMCNC: 33.6 G/DL (ref 31–37)
MCV RBC AUTO: 90.1 FL (ref 80–100)
MONOCYTES NFR BLD: 0.56 K/UL (ref 0.1–0.8)
MONOCYTES NFR BLD: 5 % (ref 1–7)
MORPHOLOGY: NORMAL
NEUTROPHILS NFR BLD: 83 % (ref 36–66)
NEUTS SEG NFR BLD: 9.21 K/UL (ref 1.8–7.7)
PLATELET # BLD AUTO: 214 K/UL (ref 140–450)
PMV BLD AUTO: 8 FL (ref 6–12)
POTASSIUM SERPL-SCNC: 4.8 MMOL/L (ref 3.7–5.3)
PROT SERPL-MCNC: 6.2 G/DL (ref 6.4–8.3)
RBC # BLD AUTO: 4.46 M/UL (ref 4.5–5.9)
SODIUM SERPL-SCNC: 138 MMOL/L (ref 135–144)
WBC OTHER # BLD: 11.1 K/UL (ref 3.5–11)

## 2024-07-22 PROCEDURE — G8420 CALC BMI NORM PARAMETERS: HCPCS | Performed by: INTERNAL MEDICINE

## 2024-07-22 PROCEDURE — 96411 CHEMO IV PUSH ADDL DRUG: CPT

## 2024-07-22 PROCEDURE — 6360000002 HC RX W HCPCS: Performed by: INTERNAL MEDICINE

## 2024-07-22 PROCEDURE — 85025 COMPLETE CBC W/AUTO DIFF WBC: CPT

## 2024-07-22 PROCEDURE — 99211 OFF/OP EST MAY X REQ PHY/QHP: CPT | Performed by: INTERNAL MEDICINE

## 2024-07-22 PROCEDURE — 96375 TX/PRO/DX INJ NEW DRUG ADDON: CPT

## 2024-07-22 PROCEDURE — 36591 DRAW BLOOD OFF VENOUS DEVICE: CPT

## 2024-07-22 PROCEDURE — 96415 CHEMO IV INFUSION ADDL HR: CPT

## 2024-07-22 PROCEDURE — 99214 OFFICE O/P EST MOD 30 MIN: CPT | Performed by: INTERNAL MEDICINE

## 2024-07-22 PROCEDURE — 2580000003 HC RX 258: Performed by: INTERNAL MEDICINE

## 2024-07-22 PROCEDURE — 96377 APPLICATON ON-BODY INJECTOR: CPT

## 2024-07-22 PROCEDURE — 96366 THER/PROPH/DIAG IV INF ADDON: CPT

## 2024-07-22 PROCEDURE — G8428 CUR MEDS NOT DOCUMENT: HCPCS | Performed by: INTERNAL MEDICINE

## 2024-07-22 PROCEDURE — 6370000000 HC RX 637 (ALT 250 FOR IP): Performed by: INTERNAL MEDICINE

## 2024-07-22 PROCEDURE — 1036F TOBACCO NON-USER: CPT | Performed by: INTERNAL MEDICINE

## 2024-07-22 PROCEDURE — 80053 COMPREHEN METABOLIC PANEL: CPT

## 2024-07-22 PROCEDURE — 3017F COLORECTAL CA SCREEN DOC REV: CPT | Performed by: INTERNAL MEDICINE

## 2024-07-22 PROCEDURE — 1123F ACP DISCUSS/DSCN MKR DOCD: CPT | Performed by: INTERNAL MEDICINE

## 2024-07-22 PROCEDURE — 96413 CHEMO IV INFUSION 1 HR: CPT

## 2024-07-22 RX ORDER — FAMOTIDINE 10 MG/ML
20 INJECTION, SOLUTION INTRAVENOUS
OUTPATIENT
Start: 2024-08-12

## 2024-07-22 RX ORDER — SODIUM CHLORIDE 9 MG/ML
5-250 INJECTION, SOLUTION INTRAVENOUS PRN
Status: CANCELLED | OUTPATIENT
Start: 2024-07-22

## 2024-07-22 RX ORDER — MEPERIDINE HYDROCHLORIDE 50 MG/ML
12.5 INJECTION INTRAMUSCULAR; INTRAVENOUS; SUBCUTANEOUS PRN
OUTPATIENT
Start: 2024-08-12

## 2024-07-22 RX ORDER — DEXAMETHASONE SODIUM PHOSPHATE 10 MG/ML
10 INJECTION INTRAMUSCULAR; INTRAVENOUS
Status: COMPLETED | OUTPATIENT
Start: 2024-07-22 | End: 2024-07-22

## 2024-07-22 RX ORDER — SODIUM CHLORIDE 9 MG/ML
5-250 INJECTION, SOLUTION INTRAVENOUS PRN
OUTPATIENT
Start: 2024-08-12

## 2024-07-22 RX ORDER — DOXORUBICIN HYDROCHLORIDE 2 MG/ML
50 INJECTION, SOLUTION INTRAVENOUS ONCE
Status: COMPLETED | OUTPATIENT
Start: 2024-07-22 | End: 2024-07-22

## 2024-07-22 RX ORDER — SODIUM CHLORIDE 9 MG/ML
INJECTION, SOLUTION INTRAVENOUS CONTINUOUS
OUTPATIENT
Start: 2024-08-12

## 2024-07-22 RX ORDER — ACETAMINOPHEN 325 MG/1
650 TABLET ORAL
OUTPATIENT
Start: 2024-08-12

## 2024-07-22 RX ORDER — ACETAMINOPHEN 325 MG/1
650 TABLET ORAL
Status: CANCELLED | OUTPATIENT
Start: 2024-07-22

## 2024-07-22 RX ORDER — DIPHENHYDRAMINE HYDROCHLORIDE 50 MG/ML
50 INJECTION INTRAMUSCULAR; INTRAVENOUS ONCE
Status: COMPLETED | OUTPATIENT
Start: 2024-07-22 | End: 2024-07-22

## 2024-07-22 RX ORDER — ONDANSETRON 2 MG/ML
8 INJECTION INTRAMUSCULAR; INTRAVENOUS
Status: CANCELLED | OUTPATIENT
Start: 2024-07-22

## 2024-07-22 RX ORDER — PALONOSETRON 0.05 MG/ML
0.25 INJECTION, SOLUTION INTRAVENOUS ONCE
Status: COMPLETED | OUTPATIENT
Start: 2024-07-22 | End: 2024-07-22

## 2024-07-22 RX ORDER — DIPHENHYDRAMINE HYDROCHLORIDE 50 MG/ML
50 INJECTION INTRAMUSCULAR; INTRAVENOUS
OUTPATIENT
Start: 2024-08-12

## 2024-07-22 RX ORDER — DIPHENHYDRAMINE HYDROCHLORIDE 50 MG/ML
50 INJECTION INTRAMUSCULAR; INTRAVENOUS ONCE
Status: CANCELLED | OUTPATIENT
Start: 2024-07-22 | End: 2024-07-22

## 2024-07-22 RX ORDER — FAMOTIDINE 10 MG/ML
20 INJECTION, SOLUTION INTRAVENOUS
Status: CANCELLED | OUTPATIENT
Start: 2024-07-22

## 2024-07-22 RX ORDER — POLYETHYLENE GLYCOL 3350 17 G/17G
17 POWDER, FOR SOLUTION ORAL DAILY
Qty: 510 G | Refills: 2 | Status: SHIPPED | OUTPATIENT
Start: 2024-07-22 | End: 2024-08-21

## 2024-07-22 RX ORDER — HEPARIN SODIUM (PORCINE) LOCK FLUSH IV SOLN 100 UNIT/ML 100 UNIT/ML
500 SOLUTION INTRAVENOUS PRN
Status: CANCELLED | OUTPATIENT
Start: 2024-07-22

## 2024-07-22 RX ORDER — ALPRAZOLAM 0.5 MG/1
0.5 TABLET ORAL 3 TIMES DAILY PRN
Qty: 90 TABLET | Refills: 0 | Status: SHIPPED | OUTPATIENT
Start: 2024-07-22 | End: 2024-08-21

## 2024-07-22 RX ORDER — ALBUTEROL SULFATE 90 UG/1
4 AEROSOL, METERED RESPIRATORY (INHALATION) PRN
Status: CANCELLED | OUTPATIENT
Start: 2024-07-22

## 2024-07-22 RX ORDER — PROCHLORPERAZINE EDISYLATE 5 MG/ML
5 INJECTION INTRAMUSCULAR; INTRAVENOUS
Status: CANCELLED | OUTPATIENT
Start: 2024-07-22

## 2024-07-22 RX ORDER — ALBUTEROL SULFATE 90 UG/1
4 AEROSOL, METERED RESPIRATORY (INHALATION) PRN
OUTPATIENT
Start: 2024-08-12

## 2024-07-22 RX ORDER — ACETAMINOPHEN 325 MG/1
650 TABLET ORAL ONCE
Status: COMPLETED | OUTPATIENT
Start: 2024-07-22 | End: 2024-07-22

## 2024-07-22 RX ORDER — SODIUM CHLORIDE 9 MG/ML
5-250 INJECTION, SOLUTION INTRAVENOUS PRN
Status: DISCONTINUED | OUTPATIENT
Start: 2024-07-22 | End: 2024-07-23 | Stop reason: HOSPADM

## 2024-07-22 RX ORDER — PREDNISONE 50 MG/1
TABLET ORAL
COMMUNITY
Start: 2024-07-20

## 2024-07-22 RX ORDER — SODIUM CHLORIDE 9 MG/ML
INJECTION, SOLUTION INTRAVENOUS CONTINUOUS
Status: CANCELLED | OUTPATIENT
Start: 2024-07-22

## 2024-07-22 RX ORDER — PALONOSETRON 0.05 MG/ML
0.25 INJECTION, SOLUTION INTRAVENOUS ONCE
OUTPATIENT
Start: 2024-08-12 | End: 2024-08-12

## 2024-07-22 RX ORDER — SODIUM CHLORIDE 0.9 % (FLUSH) 0.9 %
5-40 SYRINGE (ML) INJECTION PRN
Status: CANCELLED | OUTPATIENT
Start: 2024-07-22

## 2024-07-22 RX ORDER — PALONOSETRON 0.05 MG/ML
0.25 INJECTION, SOLUTION INTRAVENOUS ONCE
Status: CANCELLED | OUTPATIENT
Start: 2024-07-22 | End: 2024-07-22

## 2024-07-22 RX ORDER — ACETAMINOPHEN 325 MG/1
650 TABLET ORAL ONCE
OUTPATIENT
Start: 2024-08-12 | End: 2024-08-12

## 2024-07-22 RX ORDER — HEPARIN 100 UNIT/ML
500 SYRINGE INTRAVENOUS PRN
Status: DISCONTINUED | OUTPATIENT
Start: 2024-07-22 | End: 2024-07-23 | Stop reason: HOSPADM

## 2024-07-22 RX ORDER — SODIUM CHLORIDE 0.9 % (FLUSH) 0.9 %
5-40 SYRINGE (ML) INJECTION PRN
OUTPATIENT
Start: 2024-08-12

## 2024-07-22 RX ORDER — ACETAMINOPHEN 325 MG/1
650 TABLET ORAL ONCE
Status: CANCELLED | OUTPATIENT
Start: 2024-07-22 | End: 2024-07-22

## 2024-07-22 RX ORDER — MEPERIDINE HYDROCHLORIDE 50 MG/ML
12.5 INJECTION INTRAMUSCULAR; INTRAVENOUS; SUBCUTANEOUS PRN
Status: CANCELLED | OUTPATIENT
Start: 2024-07-22

## 2024-07-22 RX ORDER — ONDANSETRON 2 MG/ML
8 INJECTION INTRAMUSCULAR; INTRAVENOUS
OUTPATIENT
Start: 2024-08-12

## 2024-07-22 RX ORDER — DIPHENHYDRAMINE HYDROCHLORIDE 50 MG/ML
50 INJECTION INTRAMUSCULAR; INTRAVENOUS
Status: CANCELLED | OUTPATIENT
Start: 2024-07-22

## 2024-07-22 RX ORDER — DOXORUBICIN HYDROCHLORIDE 2 MG/ML
50 INJECTION, SOLUTION INTRAVENOUS ONCE
OUTPATIENT
Start: 2024-08-12 | End: 2024-08-12

## 2024-07-22 RX ORDER — PROCHLORPERAZINE EDISYLATE 5 MG/ML
5 INJECTION INTRAMUSCULAR; INTRAVENOUS
OUTPATIENT
Start: 2024-08-12

## 2024-07-22 RX ORDER — SODIUM CHLORIDE 0.9 % (FLUSH) 0.9 %
5-40 SYRINGE (ML) INJECTION PRN
Status: DISCONTINUED | OUTPATIENT
Start: 2024-07-22 | End: 2024-07-23 | Stop reason: HOSPADM

## 2024-07-22 RX ORDER — EPINEPHRINE 1 MG/ML
0.3 INJECTION, SOLUTION, CONCENTRATE INTRAVENOUS PRN
OUTPATIENT
Start: 2024-08-12

## 2024-07-22 RX ORDER — DOXORUBICIN HYDROCHLORIDE 2 MG/ML
50 INJECTION, SOLUTION INTRAVENOUS ONCE
Status: CANCELLED | OUTPATIENT
Start: 2024-07-22 | End: 2024-07-22

## 2024-07-22 RX ORDER — EPINEPHRINE 1 MG/ML
0.3 INJECTION, SOLUTION, CONCENTRATE INTRAVENOUS PRN
Status: CANCELLED | OUTPATIENT
Start: 2024-07-22

## 2024-07-22 RX ORDER — HEPARIN SODIUM (PORCINE) LOCK FLUSH IV SOLN 100 UNIT/ML 100 UNIT/ML
500 SOLUTION INTRAVENOUS PRN
OUTPATIENT
Start: 2024-08-12

## 2024-07-22 RX ORDER — DIPHENHYDRAMINE HYDROCHLORIDE 50 MG/ML
50 INJECTION INTRAMUSCULAR; INTRAVENOUS ONCE
OUTPATIENT
Start: 2024-08-12 | End: 2024-08-12

## 2024-07-22 RX ADMIN — CYCLOPHOSPHAMIDE 1540 MG: 200 INJECTION, SOLUTION INTRAVENOUS at 14:00

## 2024-07-22 RX ADMIN — ACETAMINOPHEN 650 MG: 325 TABLET ORAL at 09:21

## 2024-07-22 RX ADMIN — SODIUM CHLORIDE, PRESERVATIVE FREE 10 ML: 5 INJECTION INTRAVENOUS at 14:38

## 2024-07-22 RX ADMIN — SODIUM CHLORIDE 800 MG: 9 INJECTION, SOLUTION INTRAVENOUS at 10:05

## 2024-07-22 RX ADMIN — SODIUM CHLORIDE 150 ML/HR: 9 INJECTION, SOLUTION INTRAVENOUS at 09:20

## 2024-07-22 RX ADMIN — DEXAMETHASONE SODIUM PHOSPHATE 10 MG: 10 INJECTION INTRAMUSCULAR; INTRAVENOUS at 09:58

## 2024-07-22 RX ADMIN — DOXORUBICIN HYDROCHLORIDE 102 MG: 2 INJECTION, SOLUTION INTRAVENOUS at 13:32

## 2024-07-22 RX ADMIN — PEGFILGRASTIM 6 MG: KIT SUBCUTANEOUS at 14:01

## 2024-07-22 RX ADMIN — SODIUM CHLORIDE 150 MG: 9 INJECTION, SOLUTION INTRAVENOUS at 13:03

## 2024-07-22 RX ADMIN — DIPHENHYDRAMINE HYDROCHLORIDE 50 MG: 50 INJECTION INTRAMUSCULAR; INTRAVENOUS at 09:20

## 2024-07-22 RX ADMIN — PALONOSETRON 0.25 MG: 0.05 INJECTION, SOLUTION INTRAVENOUS at 13:00

## 2024-07-22 RX ADMIN — VINCRISTINE SULFATE 2 MG: 1 INJECTION, SOLUTION INTRAVENOUS at 13:39

## 2024-07-22 RX ADMIN — HEPARIN 500 UNITS: 100 SYRINGE at 14:38

## 2024-07-22 NOTE — TELEPHONE ENCOUNTER
Instructions   from Dr. Catherine Villanueva MD    Proceed with chemo as ordered  Miralax and Xanax  RV 3 weeks        Patient scheduled for 3 week f/u 08/12/2024 at 8:45 am with tx to follow at 9:00 am.

## 2024-07-22 NOTE — PROGRESS NOTES
condition->Emergency Medical Condition (MA)  Reason for Exam: dizziness, headache    FINDINGS:  BRAIN/VENTRICLES: There is mild to moderate cerebral atrophy.  There is no  acute intracranial hemorrhage, mass effect or midline shift.  No abnormal  extra-axial fluid collection.  The gray-white differentiation is maintained  without evidence of an acute infarct.  There are periventricular and  subcortical white matter chronic microvascular ischemic changes.  Small  chronic lacunar infarcts noted within the bilateral basal ganglia.  There is  no evidence of hydrocephalus. There is atherosclerosis of the cavernous ICAs.    ORBITS: The visualized portion of the orbits demonstrate no acute abnormality.    SINUSES: The visualized paranasal sinuses and mastoid air cells demonstrate  no acute abnormality.    SOFT TISSUES/SKULL:  No acute abnormality of the visualized skull or soft  tissues.  Impression: 1. No acute intracranial abnormality is identified.  2. Cerebral atrophy with chronic microvascular ischemic white matter changes.  3. Small bilateral basal ganglia chronic lacunar infarcts.          IMPRESSION:   Diffuse large cell B-cell non-Hodgkin's lymphoma.  MYC negative.    Left cervical lymphadenopathy  Dysphagia  Past history of tobacco abuse.  Quit 2000.    PLAN: Records, labs and images were reviewed and discussed with the patient. I explained to the patient the nature of this problem with cervical lymphadenopathy and underlying cause and management plan.  I reviewed the ultrasound results and explained to the patient and his  in layman language.  Findings are quite concerning for underlying malignancy.  Head and neck cancer with lymph node metastasis versus lymphoma.  Soft tissue neck CT scan was reviewed and biopsy by IR as well.  Biopsy showed no viable cells.   Patient was referred to ENT surgery for excisional biopsy of cervical lymph node or other lesion to achieve diagnosis.    Pathology results

## 2024-07-22 NOTE — PROGRESS NOTES
Patient arrived for C2D1 rchop. Pt had visit with Dr Villanueva, labs reviewed and within treatable limits, received order to proceed with treatment today. Treatment completed without issue. Pt stable at discharge. Scheduled to return 8/12/24 for MD visit and C3D1 rchop.

## 2024-07-22 NOTE — TELEPHONE ENCOUNTER
Name: Royal Redd  : 1950  MRN: [id number]    Oncology Navigation Follow-Up Note    Contact Type:  Telephone    Notes: PORT accessed with Power Loc 20G x 0.75in needle without difficulty under sterile procedure. 10mls blood discarded and 8mls drawn for labs and flushed with 10mls NS without diff. Labs sent.      Electronically signed by Linette Castellanos RN on 2024 at 8:23 AM

## 2024-07-26 ENCOUNTER — TELEPHONE (OUTPATIENT)
Dept: ONCOLOGY | Age: 74
End: 2024-07-26

## 2024-07-26 NOTE — TELEPHONE ENCOUNTER
Name: Royal Redd  : 1950  MRN: [id number]    Oncology Navigation Follow-Up Note    Contact Type:  Telephone    Notes: Upon review of [facility] care everywhere noted pt completed  Dr. Turner f/u.  Per Dr. Turner's f/u note, nasal lesion noted on exam & bx to be scheduled.  Dr. Villanueva updated.  Will continue to follow.      Electronically signed by Linette William RN on 2024 at 8:32 AM

## 2024-07-30 ENCOUNTER — TELEPHONE (OUTPATIENT)
Dept: ONCOLOGY | Age: 74
End: 2024-07-30

## 2024-07-30 RX ORDER — LORATADINE 10 MG/1
10 TABLET ORAL DAILY
Qty: 30 TABLET | Refills: 5 | Status: SHIPPED | OUTPATIENT
Start: 2024-07-30

## 2024-07-30 NOTE — TELEPHONE ENCOUNTER
[facility] Oncology Nutrition Note:   Chart reviewed d/t positive nutrition screen. Called patient at number listed in chart; no answer. Detailed message left with callback number. Await return call or attempt to contact patient at a later date.     Padma Mckeon RD, LD, CNSC  Registered Dietitian  [facility]  [phone]

## 2024-08-02 ENCOUNTER — TELEPHONE (OUTPATIENT)
Dept: ONCOLOGY | Age: 74
End: 2024-08-02

## 2024-08-02 NOTE — TELEPHONE ENCOUNTER
Name: Royal Redd  : 1950  MRN: [id number]    Oncology Navigation Follow-Up Note    Contact Type:  Telephone    Notes: Spoke w/Angeli, Dr. Turner's navigator, to inquire on nasal bx.  Angeli stated no date for bx scheduled.  Notified Angeli pt's scheduled  fro Dr. Villanueva f/u & C3 chemotherapy.  Will continue to follow.      Electronically signed by Linette William RN on 2024 at 12:38 PM

## 2024-08-06 LAB — HBA1C MFR BLD HPLC: 6.9 %

## 2024-08-07 ENCOUNTER — APPOINTMENT (OUTPATIENT)
Dept: CT IMAGING | Age: 74
End: 2024-08-07
Payer: MEDICARE

## 2024-08-07 ENCOUNTER — APPOINTMENT (OUTPATIENT)
Dept: GENERAL RADIOLOGY | Age: 74
End: 2024-08-07
Payer: MEDICARE

## 2024-08-07 ENCOUNTER — HOSPITAL ENCOUNTER (EMERGENCY)
Age: 74
Discharge: HOME | End: 2024-08-07
Attending: EMERGENCY MEDICINE
Payer: MEDICARE

## 2024-08-07 VITALS
SYSTOLIC BLOOD PRESSURE: 134 MMHG | DIASTOLIC BLOOD PRESSURE: 96 MMHG | HEIGHT: 72 IN | WEIGHT: 180 LBS | BODY MASS INDEX: 24.38 KG/M2 | OXYGEN SATURATION: 92 % | TEMPERATURE: 97.5 F | RESPIRATION RATE: 14 BRPM | HEART RATE: 74 BPM

## 2024-08-07 DIAGNOSIS — R55 SYNCOPE AND COLLAPSE: Primary | ICD-10-CM

## 2024-08-07 LAB
ALBUMIN SERPL-MCNC: 4 G/DL (ref 3.5–5.2)
ALBUMIN/GLOB SERPL: 1.8 {RATIO} (ref 1–2.5)
ALP SERPL-CCNC: 79 U/L (ref 40–129)
ALT SERPL-CCNC: 13 U/L (ref 5–41)
AMORPH SED URNS QL MICRO: ABNORMAL
ANION GAP SERPL CALCULATED.3IONS-SCNC: 12 MMOL/L (ref 9–17)
AST SERPL-CCNC: 15 U/L
BACTERIA URNS QL MICRO: ABNORMAL
BASOPHILS # BLD: 0 K/UL (ref 0–0.2)
BASOPHILS NFR BLD: 0 % (ref 0–2)
BILIRUB SERPL-MCNC: 0.5 MG/DL (ref 0.3–1.2)
BILIRUB UR QL STRIP: NEGATIVE
BUN SERPL-MCNC: 12 MG/DL (ref 8–23)
CALCIUM SERPL-MCNC: 9.4 MG/DL (ref 8.6–10.4)
CHARACTER UR: ABNORMAL
CHLORIDE SERPL-SCNC: 101 MMOL/L (ref 98–107)
CLARITY UR: CLEAR
CO2 SERPL-SCNC: 27 MMOL/L (ref 20–31)
COLOR UR: YELLOW
CREAT SERPL-MCNC: 1 MG/DL (ref 0.7–1.2)
D DIMER PPP FEU-MCNC: 10.55 UG/ML FEU
EOSINOPHIL # BLD: 0 K/UL (ref 0–0.4)
EOSINOPHILS RELATIVE PERCENT: 0 % (ref 1–4)
EPI CELLS #/AREA URNS HPF: ABNORMAL /HPF (ref 0–5)
ERYTHROCYTE [DISTWIDTH] IN BLOOD BY AUTOMATED COUNT: 15.9 % (ref 12.5–15.4)
GFR, ESTIMATED: 79 ML/MIN/1.73M2
GLUCOSE SERPL-MCNC: 122 MG/DL (ref 70–99)
GLUCOSE UR STRIP-MCNC: NEGATIVE MG/DL
HCT VFR BLD AUTO: 38.8 % (ref 41–53)
HGB BLD-MCNC: 12.8 G/DL (ref 13.5–17.5)
HGB UR QL STRIP.AUTO: ABNORMAL
KETONES UR STRIP-MCNC: NEGATIVE MG/DL
LEUKOCYTE ESTERASE UR QL STRIP: NEGATIVE
LYMPHOCYTES NFR BLD: 0.83 K/UL (ref 1–4.8)
LYMPHOCYTES RELATIVE PERCENT: 5 % (ref 24–44)
MAGNESIUM SERPL-MCNC: 2.2 MG/DL (ref 1.6–2.6)
MCH RBC QN AUTO: 30.2 PG (ref 26–34)
MCHC RBC AUTO-ENTMCNC: 32.9 G/DL (ref 31–37)
MCV RBC AUTO: 91.7 FL (ref 80–100)
MONOCYTES NFR BLD: 1.49 K/UL (ref 0.1–0.8)
MONOCYTES NFR BLD: 9 % (ref 1–7)
MORPHOLOGY: ABNORMAL
NEUTROPHILS NFR BLD: 86 % (ref 36–66)
NEUTS SEG NFR BLD: 14.28 K/UL (ref 1.8–7.7)
NITRITE UR QL STRIP: NEGATIVE
PH UR STRIP: 5.5 [PH] (ref 5–8)
PLATELET # BLD AUTO: 105 K/UL (ref 140–450)
PMV BLD AUTO: 8.4 FL (ref 6–12)
POTASSIUM SERPL-SCNC: 4.8 MMOL/L (ref 3.7–5.3)
PROT SERPL-MCNC: 6.2 G/DL (ref 6.4–8.3)
PROT UR STRIP-MCNC: NEGATIVE MG/DL
RBC # BLD AUTO: 4.23 M/UL (ref 4.5–5.9)
RBC #/AREA URNS HPF: ABNORMAL /HPF (ref 0–2)
SODIUM SERPL-SCNC: 140 MMOL/L (ref 135–144)
SP GR UR STRIP: 1.02 (ref 1–1.03)
TROPONIN I SERPL HS-MCNC: 26 NG/L (ref 0–22)
TROPONIN I SERPL HS-MCNC: 28 NG/L (ref 0–22)
UROBILINOGEN UR STRIP-ACNC: NORMAL EU/DL (ref 0–1)
WBC #/AREA URNS HPF: ABNORMAL /HPF (ref 0–5)
WBC OTHER # BLD: 16.6 K/UL (ref 3.5–11)

## 2024-08-07 PROCEDURE — 36415 COLL VENOUS BLD VENIPUNCTURE: CPT

## 2024-08-07 PROCEDURE — 2580000003 HC RX 258: Performed by: EMERGENCY MEDICINE

## 2024-08-07 PROCEDURE — 85025 COMPLETE CBC W/AUTO DIFF WBC: CPT

## 2024-08-07 PROCEDURE — 84484 ASSAY OF TROPONIN QUANT: CPT

## 2024-08-07 PROCEDURE — 71260 CT THORAX DX C+: CPT

## 2024-08-07 PROCEDURE — 80053 COMPREHEN METABOLIC PANEL: CPT

## 2024-08-07 PROCEDURE — 93005 ELECTROCARDIOGRAM TRACING: CPT | Performed by: EMERGENCY MEDICINE

## 2024-08-07 PROCEDURE — 71045 X-RAY EXAM CHEST 1 VIEW: CPT

## 2024-08-07 PROCEDURE — 81001 URINALYSIS AUTO W/SCOPE: CPT

## 2024-08-07 PROCEDURE — 99285 EMERGENCY DEPT VISIT HI MDM: CPT

## 2024-08-07 PROCEDURE — 83735 ASSAY OF MAGNESIUM: CPT

## 2024-08-07 PROCEDURE — 85379 FIBRIN DEGRADATION QUANT: CPT

## 2024-08-07 PROCEDURE — 70450 CT HEAD/BRAIN W/O DYE: CPT

## 2024-08-07 PROCEDURE — 72125 CT NECK SPINE W/O DYE: CPT

## 2024-08-07 PROCEDURE — 6360000004 HC RX CONTRAST MEDICATION: Performed by: EMERGENCY MEDICINE

## 2024-08-07 RX ORDER — 0.9 % SODIUM CHLORIDE 0.9 %
500 INTRAVENOUS SOLUTION INTRAVENOUS ONCE
Status: COMPLETED | OUTPATIENT
Start: 2024-08-07 | End: 2024-08-07

## 2024-08-07 RX ORDER — SODIUM CHLORIDE 0.9 % (FLUSH) 0.9 %
10 SYRINGE (ML) INJECTION PRN
Status: DISCONTINUED | OUTPATIENT
Start: 2024-08-07 | End: 2024-08-07 | Stop reason: HOSPADM

## 2024-08-07 RX ORDER — 0.9 % SODIUM CHLORIDE 0.9 %
80 INTRAVENOUS SOLUTION INTRAVENOUS ONCE
Status: DISCONTINUED | OUTPATIENT
Start: 2024-08-07 | End: 2024-08-07 | Stop reason: HOSPADM

## 2024-08-07 RX ADMIN — IOPAMIDOL 75 ML: 755 INJECTION, SOLUTION INTRAVENOUS at 12:53

## 2024-08-07 RX ADMIN — SODIUM CHLORIDE 500 ML: 9 INJECTION, SOLUTION INTRAVENOUS at 09:33

## 2024-08-07 RX ADMIN — SODIUM CHLORIDE, PRESERVATIVE FREE 10 ML: 5 INJECTION INTRAVENOUS at 12:53

## 2024-08-07 RX ADMIN — Medication 80 ML: at 12:54

## 2024-08-07 ASSESSMENT — ENCOUNTER SYMPTOMS
SHORTNESS OF BREATH: 0
SORE THROAT: 0
DIARRHEA: 0
VOMITING: 0

## 2024-08-07 ASSESSMENT — PAIN - FUNCTIONAL ASSESSMENT: PAIN_FUNCTIONAL_ASSESSMENT: 0-10

## 2024-08-07 ASSESSMENT — PAIN SCALES - GENERAL: PAINLEVEL_OUTOF10: 1

## 2024-08-07 ASSESSMENT — PAIN DESCRIPTION - LOCATION: LOCATION: BACK

## 2024-08-07 NOTE — ED PROVIDER NOTES
Troponin   Result Value Ref Range    Troponin, High Sensitivity 28 (H) 0 - 22 ng/L   D-Dimer, Quantitative   Result Value Ref Range    D-Dimer, Quant 10.55 ug/mL FEU   Microscopic Urinalysis   Result Value Ref Range    WBC, UA 2 TO 5 0 - 5 /HPF    RBC, UA 2 TO 5 0 - 2 /HPF    Epithelial Cells, UA None 0 - 5 /HPF    Bacteria, UA None None    Amorphous, UA 1+ (A) None    Other Observations UA (A) NOT REQ.     Utilizing a urinalysis as the only screening method to exclude a potential uropathogen can be unreliable in many patient populations.  Rapid screening tests are less sensitive than culture and if UTI is a clinical possibility, culture should be considered despite a negative urinalysis.     EKG 12 Lead   Result Value Ref Range    Ventricular Rate 85 BPM    Atrial Rate 85 BPM    P-R Interval 138 ms    QRS Duration 90 ms    Q-T Interval 368 ms    QTc Calculation (Bazett) 437 ms    P Axis 18 degrees    R Axis -16 degrees    T Axis 21 degrees       RADIOLOGY:  CT CHEST PULMONARY EMBOLISM W CONTRAST   Final Result   No pulmonary embolism.  No acute abnormality identified in the chest.         XR CHEST PORTABLE   Final Result   No acute cardiopulmonary process.         CT CERVICAL SPINE WO CONTRAST   Final Result   No acute abnormality of the cervical spine.         CT HEAD WO CONTRAST   Final Result   No acute intracranial abnormality.               ED Course     The patient was given the following medications:  Orders Placed This Encounter   Medications    sodium chloride 0.9 % bolus 500 mL    sodium chloride 0.9 % bolus 80 mL    iopamidol (ISOVUE-370) 76 % injection 75 mL    sodium chloride flush 0.9 % injection 10 mL         RECENT VITALS:  BP (!) 134/96   Pulse 74   Temp 97.5 °F (36.4 °C) (Oral)   Resp 14   Ht 1.829 m (6')   Wt 81.6 kg (180 lb)   SpO2 92%   BMI 24.41 kg/m²       OARRS Report if indicated     Patient signed out to me by Dr. Connor for evaluation of syncope.  Patient states he was brushing  Discharge Medication List          DISPOSITION:   DISPOSITION Decision To Discharge 08/07/2024 02:00:21 PM      (Please note that portions of this note were completed with a voice recognition program.  Efforts were made to edit the dictations but occasionally words are mis-transcribed.  Additionally, portions of this note may also include information that was incorporated after care transfer to another provider that were not available at the time of my evaluation.  Some of this information could likely include laboratory values, vital sign updates, medications etc.)    DO Tate Wright Ingrid, DO  08/07/24 0800

## 2024-08-07 NOTE — ED PROVIDER NOTES
[facility] Emergency Department  [address]  [facility] [address]  Phone: [phone]  Fax: [phone]        [facility] EMERGENCY DEPARTMENT  EMERGENCY DEPARTMENT ENCOUNTER      Pt Name: Royal Redd  MRN: [id number]  Birthdate 1950  Date of evaluation: 8/7/2024  Provider: Dennis Connor DO    CHIEF COMPLAINT       Chief Complaint   Patient presents with    Loss of Consciousness     Pt was brushing his teeth and had syncopal episiode         HISTORY OF PRESENT ILLNESS   (Location/Symptom, Timing/Onset,Context/Setting, Quality, Duration, Modifying Factors, Severity)  Note limiting factors.   Royal Redd is a 74 y.o. male who presents to the emergency department for the evaluation of a syncopal episode.  Patient states that he was brushing his teeth today and passed out falling into the bathtub.  He is not sure if he hit his head, he is not having a headache.  He has mild pain in his neck.  No numbness, tingling or weakness in his arms or legs.  He is on a blood thinner.  He has no other injuries or complaints.  He denies chest pain or shortness of breath    Nursing Notes were reviewed.    REVIEW OF SYSTEMS    (2-9systems for level 4, 10 or more for level 5)     Review of Systems   Constitutional:  Negative for fever.   HENT:  Negative for sore throat.    Respiratory:  Negative for shortness of breath.    Cardiovascular:  Negative for chest pain.   Gastrointestinal:  Negative for diarrhea and vomiting.   Genitourinary:  Negative for dysuria.   Skin:  Negative for rash.   Neurological:  Positive for syncope. Negative for weakness.   All other systems reviewed and are negative.      Except asnoted above the remainder of the review of systems was reviewed and negative.       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   Neurological:      General: No focal deficit present.      Mental Status: He is alert. Mental status is at baseline.      Motor: No weakness.      Comments: Speaking normally. No facial asymmetry. Moving all 4 extremities. Normal gait.    Psychiatric:         Mood and Affect: Mood normal.         EMERGENCY DEPARTMENT COURSE and DIFFERENTIAL DIAGNOSIS/MDM:   Vitals:    Vitals:    08/07/24 0922   BP: 119/79   Pulse: 87   Resp: 19   Temp: 97.5 °F (36.4 °C)   TempSrc: Oral   SpO2: 92%   Weight: 81.6 kg (180 lb)   Height: 1.829 m (6')       Patient presents to the emergency department with a complaint described above.  Vital signs are grossly normal, patient nontoxic, resting comfortably, no distress.  At this time, suspect vasovagal syncope but I am obtaining CT head without contrast, twelve-lead EKG, routine blood work, cardiac enzymes, urinalysis, getting CT of the cervical spine because he had just a little bit of pain there but there was not any step-off or deformity.  I will then reassess      DIAGNOSTIC RESULTS     Twelve lead EKG interpreted by myself:  A 12 lead EKG done at 0922, interpreted by myself, showed a regular rhythm at a rate of 85bpm.  The CT interval was normal.  The QRS complex was normal.  There was no ST segment elevation or depression, T wave inversion not present.  QRS progression through precordial leads was grossly normal.  Interpretation: Normal sinus rhythm, no ST segment changes, no pattern consistent with acute ischemia or infarct    LABS:  Labs Reviewed   CBC WITH AUTO DIFFERENTIAL - Abnormal; Notable for the following components:       Result Value    WBC 16.6 (*)     RBC 4.23 (*)     Hemoglobin 12.8 (*)     Hematocrit 38.8 (*)     RDW 15.9 (*)     Platelets 105 (*)     Neutrophils % 86 (*)     Lymphocytes % 5 (*)     Monocytes % 9 (*)     Eosinophils % 0 (*)     Neutrophils Absolute 14.28 (*)     Lymphocytes Absolute 0.83 (*)     Monocytes Absolute 1.49 (*)     All other

## 2024-08-07 NOTE — DISCHARGE INSTRUCTIONS
Goal get the Holter monitor placed prior to leaving the hospital premises.  Follow-up with your primary care doctor and cardiologist in the morning for reevaluation.  Return to emergency department with any problems or concerns as discussed.

## 2024-08-08 LAB
EKG ATRIAL RATE: 85 BPM
EKG P AXIS: 18 DEGREES
EKG P-R INTERVAL: 138 MS
EKG Q-T INTERVAL: 368 MS
EKG QRS DURATION: 90 MS
EKG QTC CALCULATION (BAZETT): 437 MS
EKG R AXIS: -16 DEGREES
EKG T AXIS: 21 DEGREES
EKG VENTRICULAR RATE: 85 BPM

## 2024-08-12 ENCOUNTER — CLINICAL DOCUMENTATION (OUTPATIENT)
Dept: ONCOLOGY | Age: 74
End: 2024-08-12

## 2024-08-12 ENCOUNTER — HOSPITAL ENCOUNTER (OUTPATIENT)
Dept: INFUSION THERAPY | Age: 74
Discharge: HOME | End: 2024-08-12
Payer: MEDICARE

## 2024-08-12 ENCOUNTER — OFFICE VISIT (OUTPATIENT)
Dept: ONCOLOGY | Age: 74
End: 2024-08-12
Payer: MEDICARE

## 2024-08-12 VITALS
TEMPERATURE: 96.9 F | DIASTOLIC BLOOD PRESSURE: 38 MMHG | BODY MASS INDEX: 26.45 KG/M2 | HEART RATE: 88 BPM | WEIGHT: 195 LBS | SYSTOLIC BLOOD PRESSURE: 69 MMHG | OXYGEN SATURATION: 97 % | RESPIRATION RATE: 18 BRPM

## 2024-08-12 VITALS — HEART RATE: 80 BPM | SYSTOLIC BLOOD PRESSURE: 91 MMHG | DIASTOLIC BLOOD PRESSURE: 63 MMHG

## 2024-08-12 DIAGNOSIS — C83.30 DIFFUSE LARGE CELL NON-HODGKIN'S LYMPHOMA (HCC): Primary | ICD-10-CM

## 2024-08-12 LAB
ALBUMIN SERPL-MCNC: 3.8 G/DL (ref 3.5–5.2)
ALBUMIN/GLOB SERPL: 1.9 {RATIO} (ref 1–2.5)
ALP SERPL-CCNC: 62 U/L (ref 40–129)
ALT SERPL-CCNC: 7 U/L (ref 5–41)
ANION GAP SERPL CALCULATED.3IONS-SCNC: 11 MMOL/L (ref 9–17)
AST SERPL-CCNC: 11 U/L
BASOPHILS # BLD: 0.1 K/UL (ref 0–0.2)
BASOPHILS NFR BLD: 1 % (ref 0–2)
BILIRUB SERPL-MCNC: 0.6 MG/DL (ref 0.3–1.2)
BUN SERPL-MCNC: 14 MG/DL (ref 8–23)
CALCIUM SERPL-MCNC: 8.6 MG/DL (ref 8.6–10.4)
CHLORIDE SERPL-SCNC: 106 MMOL/L (ref 98–107)
CO2 SERPL-SCNC: 23 MMOL/L (ref 20–31)
CREAT SERPL-MCNC: 1.1 MG/DL (ref 0.7–1.2)
EOSINOPHIL # BLD: 0 K/UL (ref 0–0.4)
EOSINOPHILS RELATIVE PERCENT: 1 % (ref 1–4)
ERYTHROCYTE [DISTWIDTH] IN BLOOD BY AUTOMATED COUNT: 17.4 % (ref 12.5–15.4)
GFR, ESTIMATED: 70 ML/MIN/1.73M2
GLUCOSE SERPL-MCNC: 121 MG/DL (ref 70–99)
HCT VFR BLD AUTO: 36.3 % (ref 41–53)
HGB BLD-MCNC: 12.1 G/DL (ref 13.5–17.5)
LYMPHOCYTES NFR BLD: 0.8 K/UL (ref 1–4.8)
LYMPHOCYTES RELATIVE PERCENT: 9 % (ref 24–44)
MCH RBC QN AUTO: 30.8 PG (ref 26–34)
MCHC RBC AUTO-ENTMCNC: 33.2 G/DL (ref 31–37)
MCV RBC AUTO: 92.7 FL (ref 80–100)
MONOCYTES NFR BLD: 0.9 K/UL (ref 0.1–1.2)
MONOCYTES NFR BLD: 10 % (ref 2–11)
NEUTROPHILS NFR BLD: 79 % (ref 36–66)
NEUTS SEG NFR BLD: 7.3 K/UL (ref 1.8–7.7)
PLATELET # BLD AUTO: 170 K/UL (ref 140–450)
PMV BLD AUTO: 8.4 FL (ref 6–12)
POTASSIUM SERPL-SCNC: 4.4 MMOL/L (ref 3.7–5.3)
PROT SERPL-MCNC: 5.8 G/DL (ref 6.4–8.3)
RBC # BLD AUTO: 3.92 M/UL (ref 4.5–5.9)
SODIUM SERPL-SCNC: 140 MMOL/L (ref 135–144)
WBC OTHER # BLD: 9.1 K/UL (ref 3.5–11)

## 2024-08-12 PROCEDURE — 3017F COLORECTAL CA SCREEN DOC REV: CPT | Performed by: INTERNAL MEDICINE

## 2024-08-12 PROCEDURE — 96413 CHEMO IV INFUSION 1 HR: CPT

## 2024-08-12 PROCEDURE — 2580000003 HC RX 258: Performed by: INTERNAL MEDICINE

## 2024-08-12 PROCEDURE — 6360000002 HC RX W HCPCS: Performed by: INTERNAL MEDICINE

## 2024-08-12 PROCEDURE — 80053 COMPREHEN METABOLIC PANEL: CPT

## 2024-08-12 PROCEDURE — 96377 APPLICATON ON-BODY INJECTOR: CPT

## 2024-08-12 PROCEDURE — 96411 CHEMO IV PUSH ADDL DRUG: CPT

## 2024-08-12 PROCEDURE — 99211 OFF/OP EST MAY X REQ PHY/QHP: CPT | Performed by: INTERNAL MEDICINE

## 2024-08-12 PROCEDURE — 1123F ACP DISCUSS/DSCN MKR DOCD: CPT | Performed by: INTERNAL MEDICINE

## 2024-08-12 PROCEDURE — 96367 TX/PROPH/DG ADDL SEQ IV INF: CPT

## 2024-08-12 PROCEDURE — 96415 CHEMO IV INFUSION ADDL HR: CPT

## 2024-08-12 PROCEDURE — 1036F TOBACCO NON-USER: CPT | Performed by: INTERNAL MEDICINE

## 2024-08-12 PROCEDURE — 6370000000 HC RX 637 (ALT 250 FOR IP): Performed by: INTERNAL MEDICINE

## 2024-08-12 PROCEDURE — G8427 DOCREV CUR MEDS BY ELIG CLIN: HCPCS | Performed by: INTERNAL MEDICINE

## 2024-08-12 PROCEDURE — 36591 DRAW BLOOD OFF VENOUS DEVICE: CPT

## 2024-08-12 PROCEDURE — 99214 OFFICE O/P EST MOD 30 MIN: CPT | Performed by: INTERNAL MEDICINE

## 2024-08-12 PROCEDURE — 96417 CHEMO IV INFUS EACH ADDL SEQ: CPT

## 2024-08-12 PROCEDURE — 85025 COMPLETE CBC W/AUTO DIFF WBC: CPT

## 2024-08-12 PROCEDURE — G8417 CALC BMI ABV UP PARAM F/U: HCPCS | Performed by: INTERNAL MEDICINE

## 2024-08-12 PROCEDURE — 96375 TX/PRO/DX INJ NEW DRUG ADDON: CPT

## 2024-08-12 RX ORDER — EPINEPHRINE 1 MG/ML
0.3 INJECTION, SOLUTION, CONCENTRATE INTRAVENOUS PRN
OUTPATIENT
Start: 2024-09-09

## 2024-08-12 RX ORDER — SODIUM CHLORIDE 9 MG/ML
5-250 INJECTION, SOLUTION INTRAVENOUS PRN
Status: DISCONTINUED | OUTPATIENT
Start: 2024-08-12 | End: 2024-08-13 | Stop reason: HOSPADM

## 2024-08-12 RX ORDER — PALONOSETRON 0.05 MG/ML
0.25 INJECTION, SOLUTION INTRAVENOUS ONCE
Status: COMPLETED | OUTPATIENT
Start: 2024-08-12 | End: 2024-08-12

## 2024-08-12 RX ORDER — FAMOTIDINE 10 MG/ML
20 INJECTION, SOLUTION INTRAVENOUS
OUTPATIENT
Start: 2024-09-09

## 2024-08-12 RX ORDER — DOXORUBICIN HYDROCHLORIDE 2 MG/ML
50 INJECTION, SOLUTION INTRAVENOUS ONCE
Status: COMPLETED | OUTPATIENT
Start: 2024-08-12 | End: 2024-08-12

## 2024-08-12 RX ORDER — DIPHENHYDRAMINE HYDROCHLORIDE 50 MG/ML
50 INJECTION INTRAMUSCULAR; INTRAVENOUS
OUTPATIENT
Start: 2024-09-09

## 2024-08-12 RX ORDER — MEPERIDINE HYDROCHLORIDE 50 MG/ML
12.5 INJECTION INTRAMUSCULAR; INTRAVENOUS; SUBCUTANEOUS PRN
OUTPATIENT
Start: 2024-09-09

## 2024-08-12 RX ORDER — PROCHLORPERAZINE EDISYLATE 5 MG/ML
5 INJECTION INTRAMUSCULAR; INTRAVENOUS
OUTPATIENT
Start: 2024-09-09

## 2024-08-12 RX ORDER — PALONOSETRON 0.05 MG/ML
0.25 INJECTION, SOLUTION INTRAVENOUS ONCE
OUTPATIENT
Start: 2024-09-09 | End: 2024-09-02

## 2024-08-12 RX ORDER — SODIUM CHLORIDE 9 MG/ML
5-250 INJECTION, SOLUTION INTRAVENOUS PRN
OUTPATIENT
Start: 2024-09-09

## 2024-08-12 RX ORDER — ACETAMINOPHEN 325 MG/1
650 TABLET ORAL ONCE
OUTPATIENT
Start: 2024-09-09 | End: 2024-09-02

## 2024-08-12 RX ORDER — HEPARIN SODIUM (PORCINE) LOCK FLUSH IV SOLN 100 UNIT/ML 100 UNIT/ML
500 SOLUTION INTRAVENOUS PRN
OUTPATIENT
Start: 2024-09-09

## 2024-08-12 RX ORDER — SODIUM CHLORIDE 0.9 % (FLUSH) 0.9 %
5-40 SYRINGE (ML) INJECTION PRN
Status: DISCONTINUED | OUTPATIENT
Start: 2024-08-12 | End: 2024-08-13 | Stop reason: HOSPADM

## 2024-08-12 RX ORDER — DIPHENHYDRAMINE HYDROCHLORIDE 50 MG/ML
50 INJECTION INTRAMUSCULAR; INTRAVENOUS ONCE
OUTPATIENT
Start: 2024-09-09 | End: 2024-09-02

## 2024-08-12 RX ORDER — ACETAMINOPHEN 325 MG/1
650 TABLET ORAL
OUTPATIENT
Start: 2024-09-09

## 2024-08-12 RX ORDER — SODIUM CHLORIDE 0.9 % (FLUSH) 0.9 %
5-40 SYRINGE (ML) INJECTION PRN
OUTPATIENT
Start: 2024-09-09

## 2024-08-12 RX ORDER — ALBUTEROL SULFATE 90 UG/1
4 AEROSOL, METERED RESPIRATORY (INHALATION) PRN
OUTPATIENT
Start: 2024-09-09

## 2024-08-12 RX ORDER — DIPHENHYDRAMINE HYDROCHLORIDE 50 MG/ML
50 INJECTION INTRAMUSCULAR; INTRAVENOUS ONCE
Status: COMPLETED | OUTPATIENT
Start: 2024-08-12 | End: 2024-08-12

## 2024-08-12 RX ORDER — HEPARIN 100 UNIT/ML
500 SYRINGE INTRAVENOUS PRN
Status: DISCONTINUED | OUTPATIENT
Start: 2024-08-12 | End: 2024-08-13 | Stop reason: HOSPADM

## 2024-08-12 RX ORDER — ACETAMINOPHEN 325 MG/1
650 TABLET ORAL ONCE
Status: COMPLETED | OUTPATIENT
Start: 2024-08-12 | End: 2024-08-12

## 2024-08-12 RX ORDER — ONDANSETRON 2 MG/ML
8 INJECTION INTRAMUSCULAR; INTRAVENOUS
OUTPATIENT
Start: 2024-09-09

## 2024-08-12 RX ORDER — DOXORUBICIN HYDROCHLORIDE 2 MG/ML
50 INJECTION, SOLUTION INTRAVENOUS ONCE
OUTPATIENT
Start: 2024-09-09 | End: 2024-09-02

## 2024-08-12 RX ORDER — SODIUM CHLORIDE 9 MG/ML
INJECTION, SOLUTION INTRAVENOUS CONTINUOUS
OUTPATIENT
Start: 2024-09-09

## 2024-08-12 RX ADMIN — SODIUM CHLORIDE 150 ML/HR: 9 INJECTION, SOLUTION INTRAVENOUS at 10:09

## 2024-08-12 RX ADMIN — HEPARIN 500 UNITS: 100 SYRINGE at 14:56

## 2024-08-12 RX ADMIN — ACETAMINOPHEN 650 MG: 325 TABLET ORAL at 10:09

## 2024-08-12 RX ADMIN — PALONOSETRON 0.25 MG: 0.05 INJECTION, SOLUTION INTRAVENOUS at 12:16

## 2024-08-12 RX ADMIN — SODIUM CHLORIDE 800 MG: 9 INJECTION, SOLUTION INTRAVENOUS at 10:40

## 2024-08-12 RX ADMIN — PEGFILGRASTIM 6 MG: KIT SUBCUTANEOUS at 15:13

## 2024-08-12 RX ADMIN — SODIUM CHLORIDE 150 MG: 9 INJECTION, SOLUTION INTRAVENOUS at 12:21

## 2024-08-12 RX ADMIN — SODIUM CHLORIDE, PRESERVATIVE FREE 10 ML: 5 INJECTION INTRAVENOUS at 14:56

## 2024-08-12 RX ADMIN — DIPHENHYDRAMINE HYDROCHLORIDE 50 MG: 50 INJECTION INTRAMUSCULAR; INTRAVENOUS at 10:09

## 2024-08-12 RX ADMIN — VINCRISTINE SULFATE 2 MG: 1 INJECTION, SOLUTION INTRAVENOUS at 14:32

## 2024-08-12 RX ADMIN — DOXORUBICIN HYDROCHLORIDE 102 MG: 2 INJECTION, SOLUTION INTRAVENOUS at 14:23

## 2024-08-12 RX ADMIN — SODIUM CHLORIDE, PRESERVATIVE FREE 40 ML: 5 INJECTION INTRAVENOUS at 08:51

## 2024-08-12 NOTE — PROGRESS NOTES
Patient arrives via wheelchair for ZO C3LUIS MANUEL, Neulasta OBI  Pt seen by Dr Garcia , Orders to proceed with tx  Labs drawn via med port and reviewed, within normal limits for tx  Blood return noted pre - during - and post Abraham and Vincristine administration  Patient tolerated tx without incident and discharged in stable condition  Next appointment 9/9 MD appt followed by / ZO - Neulasta C4D1

## 2024-08-12 NOTE — PROGRESS NOTES
_      Chief Complaint   Patient presents with    Follow-up     3 week  Tx to follow    Dizziness     Over weekend fainted and fell in bathroom - EMS called. Was wearing heart monitor.  Today getting out of vehicle to come in for appt felt dizzy again and like was going to faint again.  States still feels dizzy, lightheaded and very weak     DIAGNOSIS:       Diffuse large cell B-cell non-Hodgkin's lymphoma   left cervical lymphadenopathy  Largest thyroid mass and large mediastinal mass.  Dysphagia  Past history of tobacco abuse.  Quit 2000.    CURRENT THERAPY:         Start treatment with R-CHOP July 1, 2024  Probable consolidative mediastinal radiation following the chemotherapy  BRIEF CASE HISTORY:      Mr. Royal Redd is a very pleasant 74 y.o. male with history of multiple co morbidities as listed.  Patient is referred for evaluation and further management of cervical lymphadenopathy.  The patient states he felt lump in the neck about 1 month ago.  He also had difficulty swallowing for several weeks.  No choking.  He denies any fever or chills or night sweats.  He denies feeling any other lumps or bumps.  No cough sputum or hemoptysis.  No sore throat.  No headaches or dizziness.  The lump in the left neck is getting larger.  Not in pain.  Patient quit smoking in 2000.  Used to smoke 2 to 3 packs/day since teenage.  No alcohol drinking..   INTERIM HISTORY:   Patient seen for follow-up cervical lymphadenopathy.  He had large thyroid mass and mediastinal mass.  Patient was referred to IR for needle biopsy.  Was done from the thyroid area.  Pathology is not diagnostic.  No sufficient viable cells.  He was referred to ENT.  He had biopsy from left cervical lymph node and from thyroid masses.  All positive for diffuse large cell non-Hodgkin's lymphoma.  FISH test showed  MYC negative.  No double hit.    Clinically mass in

## 2024-08-12 NOTE — PROGRESS NOTES
[facility] Oncology Nutrition Note:   Chart reviewed and met with patient and spouse during MO appt d/t positive nutrition screen. At time of screen patient had reported unintentional weight loss, no appetite/interest in eating, problems swallowing, dry mouth, and not feeling my normal self but able to be up and about with fairly normal activities.   Today, pt reports he is now able to swallow much better. Decent appetite/intake at present. Weight record reviewed: noted pt had lost weight but is now regaining.    Anthropometrics:   Current Height: 6'  Current Weight: 195 lbs (88.5 kg)   BMI: 26.45 kg/m2 (Overweight)    Wt Readings from Last 3 Encounters:   08/12/24 88.5 kg (195 lb)   08/07/24 81.6 kg (180 lb)   07/22/24  05/10/24 80.6 kg (177 lb 11.2 oz)  86 kg (189 lb 9.6 oz)     Plan/Recommendations:   Full nutrition assessment not needed at this time. Pt is currently tolerating diet/eating well and regaining weight.   Writer encouraged patient/spouse to contact writer as needed. Both agreeable.      Padma Mckeon RD, LD, CNSC  Registered Dietitian  [facility]  [phone]

## 2024-08-19 DIAGNOSIS — R59.0 CERVICAL LYMPHADENOPATHY: ICD-10-CM

## 2024-08-19 DIAGNOSIS — C83.30 DIFFUSE LARGE CELL NON-HODGKIN'S LYMPHOMA (HCC): ICD-10-CM

## 2024-08-21 RX ORDER — ALPRAZOLAM 0.5 MG/1
TABLET ORAL
Qty: 90 TABLET | Refills: 0 | Status: SHIPPED | OUTPATIENT
Start: 2024-08-21 | End: 2024-09-20

## 2024-08-23 ENCOUNTER — OFFICE VISIT (OUTPATIENT)
Dept: PRIMARY CARE CLINIC | Age: 74
End: 2024-08-23
Payer: MEDICARE

## 2024-08-23 VITALS
DIASTOLIC BLOOD PRESSURE: 70 MMHG | OXYGEN SATURATION: 98 % | WEIGHT: 181.6 LBS | HEART RATE: 91 BPM | BODY MASS INDEX: 24.63 KG/M2 | SYSTOLIC BLOOD PRESSURE: 100 MMHG

## 2024-08-23 DIAGNOSIS — C83.30 DIFFUSE LARGE CELL NON-HODGKIN'S LYMPHOMA (HCC): ICD-10-CM

## 2024-08-23 DIAGNOSIS — E78.2 MIXED HYPERLIPIDEMIA: ICD-10-CM

## 2024-08-23 DIAGNOSIS — E03.9 ACQUIRED HYPOTHYROIDISM: ICD-10-CM

## 2024-08-23 DIAGNOSIS — G89.29 CHRONIC MIDLINE LOW BACK PAIN WITHOUT SCIATICA: ICD-10-CM

## 2024-08-23 DIAGNOSIS — M54.50 CHRONIC MIDLINE LOW BACK PAIN WITHOUT SCIATICA: ICD-10-CM

## 2024-08-23 DIAGNOSIS — E11.9 TYPE 2 DIABETES MELLITUS WITHOUT COMPLICATION, WITHOUT LONG-TERM CURRENT USE OF INSULIN (HCC): Primary | ICD-10-CM

## 2024-08-23 PROCEDURE — 1036F TOBACCO NON-USER: CPT | Performed by: NURSE PRACTITIONER

## 2024-08-23 PROCEDURE — G8420 CALC BMI NORM PARAMETERS: HCPCS | Performed by: NURSE PRACTITIONER

## 2024-08-23 PROCEDURE — 2022F DILAT RTA XM EVC RTNOPTHY: CPT | Performed by: NURSE PRACTITIONER

## 2024-08-23 PROCEDURE — G8427 DOCREV CUR MEDS BY ELIG CLIN: HCPCS | Performed by: NURSE PRACTITIONER

## 2024-08-23 PROCEDURE — 3044F HG A1C LEVEL LT 7.0%: CPT | Performed by: NURSE PRACTITIONER

## 2024-08-23 PROCEDURE — 99214 OFFICE O/P EST MOD 30 MIN: CPT | Performed by: NURSE PRACTITIONER

## 2024-08-23 PROCEDURE — 3017F COLORECTAL CA SCREEN DOC REV: CPT | Performed by: NURSE PRACTITIONER

## 2024-08-23 PROCEDURE — 1123F ACP DISCUSS/DSCN MKR DOCD: CPT | Performed by: NURSE PRACTITIONER

## 2024-08-23 ASSESSMENT — ENCOUNTER SYMPTOMS
BLOOD IN STOOL: 0
BACK PAIN: 1
SORE THROAT: 0
VOMITING: 0
ABDOMINAL PAIN: 0
CONSTIPATION: 0
COUGH: 0
SHORTNESS OF BREATH: 0
TROUBLE SWALLOWING: 0
NAUSEA: 0
DIARRHEA: 0
WHEEZING: 0
SINUS PRESSURE: 0

## 2024-08-23 NOTE — PROGRESS NOTES
maintenance.  Instructedto continue current medications, diet and exercise.  Patient agreed with treatmentplan. Follow up as directed.     Electronicallysigned by LINUS Nichols CNP on 8/23/2024 at 4:29 PM

## 2024-09-03 ENCOUNTER — TELEPHONE (OUTPATIENT)
Dept: ONCOLOGY | Age: 74
End: 2024-09-03

## 2024-09-03 NOTE — TELEPHONE ENCOUNTER
Name: Royal Redd  : 1950  MRN: [id number]    Oncology Navigation Follow-Up Note    Contact Type:  Telephone    Notes: Vy, pt's spouse, called in stating pt passed away @ home .  Support given.  Dr. Villanueva updated.      Electronically signed by Linette William RN on 9/3/2024 at 9:19 AM

## 2024-09-04 ENCOUNTER — TELEPHONE (OUTPATIENT)
Dept: PRIMARY CARE CLINIC | Age: 74
End: 2024-09-04

## 2024-09-04 NOTE — TELEPHONE ENCOUNTER
[facility] called into office to inform PCP that patient passed away last week  and also to know where the death certificate can be sent to for signing.   Writer informed the caller where to send the certificate and verbalized understanding.